# Patient Record
Sex: MALE | Race: WHITE | NOT HISPANIC OR LATINO | Employment: FULL TIME | URBAN - METROPOLITAN AREA
[De-identification: names, ages, dates, MRNs, and addresses within clinical notes are randomized per-mention and may not be internally consistent; named-entity substitution may affect disease eponyms.]

---

## 2021-01-15 ENCOUNTER — TELEPHONE (OUTPATIENT)
Dept: DERMATOLOGY | Facility: CLINIC | Age: 51
End: 2021-01-15

## 2021-01-15 NOTE — TELEPHONE ENCOUNTER
[facility] Call Center    Phone Message    May a detailed message be left on voicemail: yes     Reason for Call: Patient called to schedule a new derm visit for a cyst by his eye and is not scheduled within 28 days. Please advise. Thank you.    Action Taken: Message routed to:  Adult Clinics: Dermatology p [id number]    Travel Screening: Not Applicable

## 2021-01-15 NOTE — TELEPHONE ENCOUNTER
I spoke with Ryan and notified him that an in person appt is appropriate.  I scheduled him with Dr. Fong on 1/25/21.  Paris Terry RN

## 2021-02-01 ENCOUNTER — OFFICE VISIT (OUTPATIENT)
Dept: DERMATOLOGY | Facility: CLINIC | Age: 51
End: 2021-02-01
Payer: COMMERCIAL

## 2021-02-01 DIAGNOSIS — D48.5 NEOPLASM OF UNCERTAIN BEHAVIOR OF SKIN: ICD-10-CM

## 2021-02-01 DIAGNOSIS — L71.9 ROSACEA: Primary | ICD-10-CM

## 2021-02-01 DIAGNOSIS — L91.8 SKIN TAG: ICD-10-CM

## 2021-02-01 PROCEDURE — 99202 OFFICE O/P NEW SF 15 MIN: CPT | Mod: 25 | Performed by: DERMATOLOGY

## 2021-02-01 PROCEDURE — 11102 TANGNTL BX SKIN SINGLE LES: CPT | Performed by: DERMATOLOGY

## 2021-02-01 PROCEDURE — 88305 TISSUE EXAM BY PATHOLOGIST: CPT | Performed by: PATHOLOGY

## 2021-02-01 PROCEDURE — 11103 TANGNTL BX SKIN EA SEP/ADDL: CPT | Performed by: DERMATOLOGY

## 2021-02-01 ASSESSMENT — PAIN SCALES - GENERAL: PAINLEVEL: NO PAIN (0)

## 2021-02-01 NOTE — NURSING NOTE
The following medication was given:     MEDICATION:  Lidocaine with epinephrine 1% 1:[id number]  ROUTE: SQ  SITE: see procedure note  DOSE: 3cc  LOT #: -[id number]  : Niki  EXPIRATION DATE: 6/2021  NDC#: 4925-7155-79   Was there drug waste? 0cc  Multi-dose vial: Yes    Nadine Donis LPN  February 1, 2021

## 2021-02-01 NOTE — LETTER
2/1/2021         RE: Ryan Briseno  [address]  [place] [address]        Dear Colleague,    Thank you for referring your patient, Ryan Briseno, to the [facility]. Please see a copy of my visit note below.    [facility] Dermatology Note  Encounter Date: Feb 1, 2021  Office Visit     Dermatology Problem List:  1. NUB - right medial lower eyelid, right upper lip  -bx 2/1/21  2. Rosacea, papular pustular type  ____________________________________________    Assessment & Plan:  # Neoplasm of uncertain behavior on the right medial lower eyelid. The differential diagnosis includes BCC vs IDN vs cyst   - See procedure note     # Neoplasm of uncertain behavior on the right upper lip. The differential diagnosis includes BCC vs IDN vs cyst   - See procedure note     # Skin tag   - Patient interested in removal of skin tag on left lower eyelid. Patient will follow up for removal.    # Rosacea.   - Patient declines treatment at this time.      Procedures Performed:   -Shave biopsy x2: Location(s) right medial lower eyelid, right upper lip.  After discussion of benefits and risks including but not limited to bleeding/bruising, pain/swelling, infection, scar, incomplete removal, nerve damage/numbness, recurrence, and non-diagnostic biopsy, written consent, verbal consent and photographs were obtained. Time-out was performed. The area was cleaned with isopropyl alcohol. 0.5mL of 1% lidocaine with epinephrine was injected to obtain adequate anesthesia of each lesion. Shave biopsy was performed. Hemostasis was achieved with aluminium chloride. Vaseline and a sterile dressing were applied. The patient tolerated the procedure and no complications were noted. The patient was provided with verbal and written post care instructions.       Follow-up: pending path results    Staff and Scribe:     Scribe Disclosure:   IJuaquin, am serving as a scribe to document  "services personally performed by this physician, Dr. Yordy Fong, based on data collection and the provider's statements to me.     Provider Disclosure:   The documentation recorded by the scribe accurately reflects the services I personally performed and the decisions made by me.  I personally performed the procedures today.    Yordy Fong DO    Department of Dermatology  [facility]: Phone: [phone], Fax:[phone]  [facility]: Phone: [phone], Fax: [phone]    ____________________________________________    CC: Derm Problem (Ryan is here today for two concerns, right medial canthus. Showed up a year ago, reports bleeding, keeps growing. And left upper eyelid has been there for about 2 years, causing his eye to twitch. )    HPI:  Mr. Ryan Briseno is a(n) 50 year old male who presents today as a new patient for a spot check x 2.    Self referred to derm. He does not have a history of skin cancer. He reports a family history of melanoma, in his father.    Today, he reports concerns about an area on the right medial canthus he would like examined. This showed up a year ago, and he has noticed the area bleeding. This has continued to grow. This spot does not hurt and is soft. This spot has not secreted anything and did not pop like a pimple. He does note he has had more acne in the past 2-3 years. He works as an .     He has another area on the left upper eyelid that's been present for 2 years and \"causes his eye to twitch\". He notes this spot will get bumped occasionally, causing it to become \"big, red and swollen\".    He has another area on his upper lip that will bleed if he bumps it while shaving. He originally believed this was a pimple. He has another spot that's been present since his teen years, that he notes used to secrete a smelly white " liquid.    Patient is otherwise feeling well, without additional concerns.    Labs:  NA    Physical Exam:  Vitals: There were no vitals taken for this visit.  SKIN: Focused examination of forehead, temples, lateral cheeks, ears, eyelids, neck and back, was performed.  - Right upper lip: pearly papule  - Right medial lower eyelid: pearly papule  - Left Posterior Shoulder: There is a raised dome shaped nodule with a central punctum  - There are erythematous papules and scattered telangectasias on the bilateral cheeks and nose.   - There is(are) skin colored pedunculated papules on the left upper eyelid.   - No other lesions of concern on areas examined.       Medications:  No current outpatient medications on file.     No current facility-administered medications for this visit.       Past Medical History:   There is no problem list on file for this patient.    No past medical history on file.        Again, thank you for allowing me to participate in the care of your patient.        Sincerely,        Yordy Fong MD

## 2021-02-01 NOTE — PROGRESS NOTES
[facility] Dermatology Note  Encounter Date: Feb 1, 2021  Office Visit     Dermatology Problem List:  1. NUB - right medial lower eyelid, right upper lip  -bx 2/1/21  2. Rosacea, papular pustular type  ____________________________________________    Assessment & Plan:  # Neoplasm of uncertain behavior on the right medial lower eyelid. The differential diagnosis includes BCC vs IDN vs cyst   - See procedure note     # Neoplasm of uncertain behavior on the right upper lip. The differential diagnosis includes BCC vs IDN vs cyst   - See procedure note     # Skin tag   - Patient interested in removal of skin tag on left lower eyelid. Patient will follow up for removal.    # Rosacea.   - Patient declines treatment at this time.      Procedures Performed:   -Shave biopsy x2: Location(s) right medial lower eyelid, right upper lip.  After discussion of benefits and risks including but not limited to bleeding/bruising, pain/swelling, infection, scar, incomplete removal, nerve damage/numbness, recurrence, and non-diagnostic biopsy, written consent, verbal consent and photographs were obtained. Time-out was performed. The area was cleaned with isopropyl alcohol. 0.5mL of 1% lidocaine with epinephrine was injected to obtain adequate anesthesia of each lesion. Shave biopsy was performed. Hemostasis was achieved with aluminium chloride. Vaseline and a sterile dressing were applied. The patient tolerated the procedure and no complications were noted. The patient was provided with verbal and written post care instructions.       Follow-up: pending path results    Staff and Scribe:     Scribe Disclosure:   I, Juaquin Espinal, am serving as a scribe to document services personally performed by this physician, Dr. Yordy Fong, based on data collection and the provider's statements to me.     Provider Disclosure:   The documentation recorded by the scribe accurately reflects the services I personally performed and  "the decisions made by me.  I personally performed the procedures today.    Yordy Fong DO    Department of Dermatology  [facility]: Phone: [phone], Fax:[phone]  [facility]: Phone: [phone], Fax: [phone]    ____________________________________________    CC: Derm Problem (Ryan is here today for two concerns, right medial canthus. Showed up a year ago, reports bleeding, keeps growing. And left upper eyelid has been there for about 2 years, causing his eye to twitch. )    HPI:  Mr. Ryan Briseno is a(n) 50 year old male who presents today as a new patient for a spot check x 2.    Self referred to derm. He does not have a history of skin cancer. He reports a family history of melanoma, in his father.    Today, he reports concerns about an area on the right medial canthus he would like examined. This showed up a year ago, and he has noticed the area bleeding. This has continued to grow. This spot does not hurt and is soft. This spot has not secreted anything and did not pop like a pimple. He does note he has had more acne in the past 2-3 years. He works as an .     He has another area on the left upper eyelid that's been present for 2 years and \"causes his eye to twitch\". He notes this spot will get bumped occasionally, causing it to become \"big, red and swollen\".    He has another area on his upper lip that will bleed if he bumps it while shaving. He originally believed this was a pimple. He has another spot that's been present since his teen years, that he notes used to secrete a smelly white liquid.    Patient is otherwise feeling well, without additional concerns.    Labs:  NA    Physical Exam:  Vitals: There were no vitals taken for this visit.  SKIN: Focused examination of forehead, temples, lateral cheeks, ears, eyelids, neck and back, was performed.  - " Right upper lip: pearly papule  - Right medial lower eyelid: pearly papule  - Left Posterior Shoulder: There is a raised dome shaped nodule with a central punctum  - There are erythematous papules and scattered telangectasias on the bilateral cheeks and nose.   - There is(are) skin colored pedunculated papules on the left upper eyelid.   - No other lesions of concern on areas examined.       Medications:  No current outpatient medications on file.     No current facility-administered medications for this visit.       Past Medical History:   There is no problem list on file for this patient.    No past medical history on file.

## 2021-02-01 NOTE — NURSING NOTE
Ryan Briseno's goals for this visit include:   Chief Complaint   Patient presents with     Derm Problem     Ryan is here today for two concerns, right medial canthus. Showed up a year ago, reports bleeding, keeps growing. And left upper eyelid has been there for about 2 years, causing his eye to twitch.        He requests these members of his care team be copied on today's visit information:     PCP: No Ref-Primary, Physician    Referring Provider:  No referring provider defined for this encounter.    There were no vitals taken for this visit.    Do you need any medication refills at today's visit? No    Misa Resendiz LPN

## 2021-02-01 NOTE — PATIENT INSTRUCTIONS

## 2021-02-03 LAB — COPATH REPORT: NORMAL

## 2021-02-08 ENCOUNTER — TELEPHONE (OUTPATIENT)
Dept: DERMATOLOGY | Facility: CLINIC | Age: 51
End: 2021-02-08

## 2021-02-08 DIAGNOSIS — Z79.2 PROPHYLACTIC ANTIBIOTIC: Primary | ICD-10-CM

## 2021-02-08 RX ORDER — CLINDAMYCIN HCL 300 MG
CAPSULE ORAL
Qty: 2 CAPSULE | Refills: 0 | Status: SHIPPED | OUTPATIENT
Start: 2021-02-08 | End: 2022-02-14

## 2021-02-08 NOTE — TELEPHONE ENCOUNTER
[facility] [place] Pre-Surgery Screening Note     Pre-screening Information:  Hx of Skin Cancer: No  Hx of Mohs Surgery: No  Transplant: No  Immunocompromised: No  Current Anticoagulant(s): None  Bleeding Disorder(s): No  Stent: No  Pacemaker: No  Defibrillator: No  Brain/Nerve Stimulator: No  Endocarditis/Rheumatic Fever Hx: No  Vascular graft: No  Prophylactic Antibiotic Needed: Yes  Prophylactic Antibotic: Clindamycin  Prophylactic Antibiotic Indication: Other (Comments)  Congenital heart defect: No  Prosthetic Heart Valve: No  Lesion on Leg/Groin: No  Prosthetic Joint : No  Diabetic: No  HIV/AIDS: No  Hepatitis: No  Pregnant: No  Prior Problem with Local Anesthesia: No  Patient wears CPAP mask: Nose  Current Tobacco Use: Yes (3/4 ppd. Pt counseled that smoking impairs wound healing.)  Current Alcohol Use: No  Extended Care Facility: No  Occupation:    needed?: Yes  Do you have mobility issues?: No  Do you use any assistive devices/DME?: No  Do you have any issues with lying for long periods of time?: No      Medications/Allergies  Medications and allergies review with patient: Yes     No current outpatient medications on file.     Allergies   Allergen Reactions     Penicillins Hives       Pertinent Labs:  Last Creatine:   Creatinine   Date Value Ref Range Status   02/26/2008 1.21 0.80 - 1.50 mg/dL Final     Last INR: No results found for: INR    Other Questions:    Reminded patient to take any ordered prophylactic antibiotics 1 hour prior to appointment: Yes    If on a prescribed anticoagulant, advised patient to continue or check with prescribing provider: n/a    If on an OTC anticoagulant/supplement, advised patient to hold these medications 10-14 days prior to surgery and resume 48 hrs after surgery: n/a     Please be aware that this can be an all day procedure. Please bring your daily medications and food/cash. Encourage patient to eat prior to  procedure(s). After care instructions were reviewed with patient.  Patient notified to have a .    Paris Terry RN

## 2021-02-08 NOTE — TELEPHONE ENCOUNTER
Results reviewed with Alyse.  He verbalized understanding and agreed with the plan.  He is going to check his schedule and call us back tomorrow to schedule on a Thursday (Feb 18th or March 4th).  Mohs previsit complete.  KATELYNN Green, Yordy LARIOS MD sent to Niall David MD    Cc: P [facility] Dermatology Adult [place]             Dr. David - -[facility]     Staff- Please call the patient with pathology results.     Both biopsies showed BCC. My treatment recommendation is Mohs.     We talked some about Mohs at his biopsy appointment. Ok to schedule Mohs unless he would prefer a virtual consult.   We could do both sites on a Thursday when Dr. David is here (he will not be here 2/25).     Thank you.    Associated Results    Dermatological path order and indications  Order: [id number]  Status:  Final result   Visible to patient:  No (inaccessible in MyChart) Dx:  Neoplasm of uncertain behavior of skin  Component 7d ago   Copath Report Patient Name: ALYSE HEARN   MR#: [id number]   Specimen #: [id number]   Collected: 2/1/2021   Received: 2/2/2021   Reported: 2/3/2021 23:02   Ordering Phy(s): YORDY OLIVEROS     For improved result formatting, select 'View Enhanced Report Format' under    Linked Documents section.     SPECIMEN(S):   A: Skin, right upper lip, shave   B: Skin, right medial lower eyelid, shave     FINAL DIAGNOSIS:   A. Skin, right upper lip, shave:   - Basal cell carcinoma, nodular type, extending to the lateral and deep   margins - (see description)     B. Skin, right medial lower eyelid, shave:   - Basal cell carcinoma, nodular type, extending to the lateral and deep   margins -

## 2021-02-11 ENCOUNTER — TELEPHONE (OUTPATIENT)
Dept: DERMATOLOGY | Facility: CLINIC | Age: 51
End: 2021-02-11

## 2021-02-11 NOTE — TELEPHONE ENCOUNTER
[facility] Call Center    Phone Message    May a detailed message be left on voicemail: yes     Reason for Call: Other: pt returning call, please call her back     Action Taken: Message routed to:  Adult Clinics: Dermatology p [id number]    Travel Screening: Not Applicable

## 2021-03-16 ENCOUNTER — TELEPHONE (OUTPATIENT)
Dept: DERMATOLOGY | Facility: CLINIC | Age: 51
End: 2021-03-16

## 2021-03-16 NOTE — TELEPHONE ENCOUNTER
Nurse spoke to patient who stated that he wants to reschedule his MOHS procedures but would like to get a skin tag removed on his left lower eyelid. Nurse informed patient of risks of delaying the skin cancer procedures, pt confirmed understanding verbally but stressed to nurse that he needs to delay the procedures due to the cost. Patient asked when he could be scheduled for his skin tag removal. Nurse will route to Dr. Fong to advise.     Misa Resendiz LPN

## 2021-03-17 NOTE — TELEPHONE ENCOUNTER
Nurse spoke to patient and confirmed information. No further questions or concerns.    Misa Resendiz LPN

## 2021-03-17 NOTE — TELEPHONE ENCOUNTER
Nurse spoke to Dr. Fong who ok'd patient to keep his appointment for skin tag removal on his left lower eyelid on 3/24/21 at 8:30am. Will plan to discuss patient wanting to delay his MOHS procedures at that appointment date and time.     Nurse attempted to call patient, no answer. LVM on patient identified voicemail informing patient of information above. Call back number provided if patient has any questions or concerns.    Misa Resendiz LPN

## 2021-03-24 ENCOUNTER — OFFICE VISIT (OUTPATIENT)
Dept: DERMATOLOGY | Facility: CLINIC | Age: 51
End: 2021-03-24
Payer: COMMERCIAL

## 2021-03-24 DIAGNOSIS — C44.01 BASAL CELL CARCINOMA OF SKIN OF RIGHT UPPER LIP: ICD-10-CM

## 2021-03-24 DIAGNOSIS — C44.1122 BASAL CELL CARCINOMA (BCC) OF RIGHT LOWER EYELID: ICD-10-CM

## 2021-03-24 DIAGNOSIS — D48.5 NEOPLASM OF UNCERTAIN BEHAVIOR OF SKIN: Primary | ICD-10-CM

## 2021-03-24 DIAGNOSIS — L91.8 INFLAMED ACROCHORDON: ICD-10-CM

## 2021-03-24 PROCEDURE — 11200 RMVL SKIN TAGS UP TO&INC 15: CPT | Performed by: DERMATOLOGY

## 2021-03-24 PROCEDURE — 99213 OFFICE O/P EST LOW 20 MIN: CPT | Mod: 25 | Performed by: DERMATOLOGY

## 2021-03-24 ASSESSMENT — PAIN SCALES - GENERAL: PAINLEVEL: NO PAIN (0)

## 2021-03-24 NOTE — NURSING NOTE
"Ryan Briseno's goals for this visit include:   Chief Complaint   Patient presents with     Skin Tags     left upper eyelid - patient states \"makes my eyelid twitch\"       He requests these members of his care team be copied on today's visit information:     PCP: No Ref-Primary, Physician    Referring Provider:  No referring provider defined for this encounter.    There were no vitals taken for this visit.    Do you need any medication refills at today's visit? No    Nadine Donis LPN      "

## 2021-03-24 NOTE — LETTER
3/24/2021         RE: Ryan Briseno  [address]  [facility] [address]        Dear Colleague,    Thank you for referring your patient, Ryan Briseno, to the [facility]. Please see a copy of my visit note below.    Left upper eyelid cryo skin tag         [facility] Dermatology Note  Encounter Date: Mar 24, 2021  Office Visit     Dermatology Problem List:  1. BCC, right upper lip. Schedule Mohs      BCC, right medial lower eyelid. Schedule Mohs.   2. Acrochordon left upper eyelid   LN2 today  3. Rosacea, papular pustular type. Declines treatment.     ____________________________________________    Assessment & Plan:     # BCC x2.  Right upper lip and the right medial lower eyelid.  Discuss risks of postponing surgery.  Patient has dental procedures currently scheduled and would like to complete them before having Mohs.  Additionally his out-of-pocket expenses are likely to be high.   Schedule Mohs surgery when able.     # Inflamed acrochordon, left upper eyelid  Benign nature reviewed.   Cryotherapy procedure note: After verbal consent and discussion of risks and benefits including but no limited to dyspigmentation/scar, blister, and pain, 1 inflamed acrochordon was treated with 1-2mm freeze border for 2 cycles with liquid nitrogen. Post cryotherapy instructions were provided.     # Neoplasm of undetermined behavior, right infraorbital cheek.   Suspicious for 3rd BCC vs granulomatous rosacea papule.   If persistent at the time of his Mohs, will plan for frozen biopsy.      Follow-up: schedule Mohs     Staff:   I personally performed the procedures today.    Yordy Fong DO    Department of Dermatology  [facility]: Phone: [phone], Fax:[phone]  [facility]: Phone: [phone], Fax:  "[phone]      ____________________________________________    CC: Skin Tags (left upper eyelid - patient states \"makes my eyelid twitch\")    HPI:  Mr. Ryan Briseno is a(n) 50 year old male who presents today as a return patient for and inflamed acrochordon of the left upper eyelid. It is starting to feel heavy. He has noticed more frequent eyelid twitching. Intermittently it will get painful.     He's also noticed another pink papule below his right eye. It is similar feeling to the BCC's previously biopsied.     Patient is otherwise feeling well, without additional skin concerns.     Labs Reviewed:  N/A    Physical Exam:  Vitals: There were no vitals taken for this visit.  SKIN: Focused examination of face was performed.  - skin colored pedunculated soft papule, 3mm on left upper eyelid.   - Pink pearly papule on right infraorbital cheek, ~7mm  - No other lesions of concern on areas examined.     Medications:  Current Outpatient Medications   Medication     clindamycin (CLEOCIN) 300 MG capsule     No current facility-administered medications for this visit.             Again, thank you for allowing me to participate in the care of your patient.        Sincerely,        Yordy Fong MD    "

## 2021-03-24 NOTE — PATIENT INSTRUCTIONS
Cryotherapy    What is it?    Use of a very cold liquid, such as liquid nitrogen, to freeze and destroy abnormal skin cells that need to be removed    What should I expect?    Tenderness and redness    A small blister that might grow and fill with dark purple blood. There may be crusting.    More than one treatment may be needed if the lesions do not go away.    How do I care for the treated area?    Gently wash the area with your hands when bathing.    Use a thin layer of Vaseline to help with healing. You may use a Band-Aid.     The area should heal within 7-10 days and may leave behind a pink or lighter color.     Do not use an antibiotic or Neosporin ointment.     You may take acetaminophen (Tylenol) for pain.     Call your Doctor if you have:    Severe pain    Signs of infection (warmth, redness, cloudy yellow drainage, and or a bad smell)    Questions or concerns    Who should I call with questions?       [facility]: [phone]       [facility]: [phone]       For urgent needs outside of business hours call the [facility] at [phone]        and ask for the dermatology resident on call

## 2021-03-24 NOTE — PROGRESS NOTES
"Left upper eyelid cryo skin tag         [facility] Dermatology Note  Encounter Date: Mar 24, 2021  Office Visit     Dermatology Problem List:  1. BCC, right upper lip. Schedule Mohs      BCC, right medial lower eyelid. Schedule Mohs.   2. Acrochordon left upper eyelid   LN2 today  3. Rosacea, papular pustular type. Declines treatment.     ____________________________________________    Assessment & Plan:     # BCC x2.  Right upper lip and the right medial lower eyelid.  Discuss risks of postponing surgery.  Patient has dental procedures currently scheduled and would like to complete them before having Mohs.  Additionally his out-of-pocket expenses are likely to be high.   Schedule Mohs surgery when able.     # Inflamed acrochordon, left upper eyelid  Benign nature reviewed.   Cryotherapy procedure note: After verbal consent and discussion of risks and benefits including but no limited to dyspigmentation/scar, blister, and pain, 1 inflamed acrochordon was treated with 1-2mm freeze border for 2 cycles with liquid nitrogen. Post cryotherapy instructions were provided.     # Neoplasm of undetermined behavior, right infraorbital cheek.   Suspicious for 3rd BCC vs granulomatous rosacea papule.   If persistent at the time of his Mohs, will plan for frozen biopsy.      Follow-up: schedule Mohs     Staff:   I personally performed the procedures today.    Yordy Fong DO    Department of Dermatology  [facility]: Phone: [phone], Fax:[phone]  [facility]: Phone: [phone], Fax: [phone]      ____________________________________________    CC: Skin Tags (left upper eyelid - patient states \"makes my eyelid twitch\")    HPI:  Mr. Ryan Briseno is a(n) 50 year old male who presents today as a return patient for and inflamed acrochordon of the left upper eyelid. It is " starting to feel heavy. He has noticed more frequent eyelid twitching. Intermittently it will get painful.     He's also noticed another pink papule below his right eye. It is similar feeling to the BCC's previously biopsied.     Patient is otherwise feeling well, without additional skin concerns.     Labs Reviewed:  N/A    Physical Exam:  Vitals: There were no vitals taken for this visit.  SKIN: Focused examination of face was performed.  - skin colored pedunculated soft papule, 3mm on left upper eyelid.   - Pink pearly papule on right infraorbital cheek, ~7mm  - No other lesions of concern on areas examined.     Medications:  Current Outpatient Medications   Medication     clindamycin (CLEOCIN) 300 MG capsule     No current facility-administered medications for this visit.

## 2021-05-24 ENCOUNTER — TELEPHONE (OUTPATIENT)
Dept: DERMATOLOGY | Facility: CLINIC | Age: 51
End: 2021-05-24
Payer: COMMERCIAL

## 2021-05-24 NOTE — TELEPHONE ENCOUNTER
I spoke with Ryan to follow up on if he would like to schedule Mohs surgery for BCC.  Due to financial circumstances and starting a new job he would like a call back in ~2 months to schedule (BCC x2.  Right upper lip and the right medial lower eyelid).    We agreed that the clinic would call him in 2 months to re-visit.    Paris Terry RN

## 2021-07-26 NOTE — TELEPHONE ENCOUNTER
I spoke with Ryan and tentatively scheduled him for 9/8/21.  He would like a phone call 1 week prior to the appointment to confirm that it will still work for him as they are moving and he needs to figure out how to get his children to school.     I notified him that we would reach out the week prior to confirm.    Paris Terry RN

## 2021-09-01 NOTE — TELEPHONE ENCOUNTER
Spoke with patient and states 9/8/21 will not work for him. He is still in the process of moving, unable to schedule another appointment while on the phone. Would like a call next week to get him scheduled.          Kia Child on 9/1/2021 at 4:23 PM

## 2021-09-07 NOTE — TELEPHONE ENCOUNTER
Attempted to call patient, no answer. LVM for patient to give us a call back, number was provided.     Misa Resendiz LPN

## 2021-10-25 NOTE — TELEPHONE ENCOUNTER
Left message to call back clinic regarding scheduling MOHS.   Kathy Bailey, [facility] on 10/25/2021 at 1:15 PM

## 2021-12-09 ENCOUNTER — TELEPHONE (OUTPATIENT)
Dept: DERMATOLOGY | Facility: CLINIC | Age: 51
End: 2021-12-09
Payer: COMMERCIAL

## 2021-12-09 NOTE — TELEPHONE ENCOUNTER
Letter written by Dr. Fong on 12/8/21 was mailed certified to patient re: untreated skin cancers.  Paris Terry RN

## 2021-12-22 NOTE — TELEPHONE ENCOUNTER
Certified letter returned as patient has new address.  Address updated in demographics and new certified letter mailed.  Paris Terry RN

## 2021-12-29 ENCOUNTER — TELEPHONE (OUTPATIENT)
Dept: DERMATOLOGY | Facility: CLINIC | Age: 51
End: 2021-12-29
Payer: COMMERCIAL

## 2021-12-29 NOTE — TELEPHONE ENCOUNTER
"Patient called back to schedule MOHS procedure (biopsied 2/1/21.) Patient was waiting until he paid off biopsies to schedule MOHS.  Patient stated he only wants to have BCC on R medial lower eyelid treated due to the recurrence of the lesion. He is not concerned about the R upper lip as he states it has not \"come back.\" I informed patient that both biopsies showed the BCC's extended to lateral and deep margins. He is needing a later morning appointment as he has to get his kids to school. He cannot come in before 9-930am. Informed him we typically do our MOHS cases between 730-830am. He declines scheduling anywhere but [facility] as well. He was informed that delaying treatment further of these skin cancers may result in larger procedures/ wounds/ scars. He will check with his wife to see if she can accommodate him coming in earlier for MOHS and see if they can financially afford to treat both skin cancers as they are also redoing their kitchen at the moment.     Kathy Bailey, CMA on 12/29/2021 at 10:08 AM    "

## 2021-12-31 NOTE — TELEPHONE ENCOUNTER
"Patient called back to schedule MOHS. Scheduled teleconsult week prior as this R medial lower eyelid has an untreated BCC from 2/1/2021. Patient was driving and unable to complete MOHS questionnaire. Will do at time of consult- noted on appt. He stated that he had felt like the clinic was \"over-the-top\" by sending him a ceritified letter re: him declining treating these but after speaking to his wife who is an inpatient RN he feels he needs to move forward. He cannot for financial reasons do both sites at once. He also needs to do it when he has childcare. Patient has concerns about being able to drive after procedure as he has kids who have doctor appointments he needs to be able to bring them to that day and the following. I informed patient that there is a chance his eye may be swollen and his ability to drive may be impaired. I also reminded patient of the risks associated with not treating his R upper lip skin cancer but he became a bit upset and I did not push this. After several discussions with patient within the last week or so I feel it important he have a consult with Dr. Fong to discuss the procedure, outcomes, risks associated with the procedure and risks associated with not treating his other skin cancer.     Dr. Fong, patient will send photo prior to consult via telephone. Is there anything else you would like us to discuss with patient prior to talking with him?    Kathy Bailey, [facility] on 12/31/2021 at 3:43 PM    "

## 2022-01-03 NOTE — TELEPHONE ENCOUNTER
I spoke with Ryan and notified him of Dr. Fong's reply.  He verbalized understanding.  Appts confirmed.  Yordy Fong MD [place], Kathy PANDYA CMA 1 hour ago (1:17 PM)     AM    I don't think we'll need Dr. Medina's help with this case. Thank you.           Paris Terry RN

## 2022-01-03 NOTE — TELEPHONE ENCOUNTER
"Attempted to call patient to inform him of message below from Dr. Fong. There was no answer. LVM on patient identified voicemail for patient to call back, number was provided.     \"Thank you for working with him on these spots. Getting good pictures of these sites for the consult will be important. If the skin cancer has grown, sometimes the oculoplastic surgeon needs to assist with the closure. If this is the case, we may need to coordinate scheduling surgery with Dr. Medina. The sooner we get the pictures the sooner I can make a decision.     Thank you. \"    We need photos from the patient as soon as possible. If patient calls back, please give him this information.    Misa Resendiz LPN    "

## 2022-01-13 ENCOUNTER — VIRTUAL VISIT (OUTPATIENT)
Dept: DERMATOLOGY | Facility: CLINIC | Age: 52
End: 2022-01-13
Payer: COMMERCIAL

## 2022-01-13 DIAGNOSIS — C44.1122 BASAL CELL CARCINOMA (BCC) OF RIGHT LOWER EYELID: Primary | ICD-10-CM

## 2022-01-13 PROCEDURE — 99213 OFFICE O/P EST LOW 20 MIN: CPT | Mod: TEL | Performed by: DERMATOLOGY

## 2022-01-13 ASSESSMENT — PAIN SCALES - GENERAL: PAINLEVEL: NO PAIN (0)

## 2022-01-13 NOTE — LETTER
1/13/2022         RE: Ryan Briseno  [address]  [facility] [address]        Dear Colleague,    Thank you for referring your patient, Ryan Briseno, to the [facility]. Please see a copy of my visit note below.    Mohs Micrographic Surgery Consult Note    Jan 13, 2022  Start time (if telephone encounter): 1:55 p.m.  End time (if telephone encounter): 2:05 p.m.    Dermatology Problem List:  1. History of BCCs  - BCC, right upper lip, pending MMS (deferred by patient as of 1/13/22 due to financial concerns)  - BCC, right medial lower eyelid, pending MMS  2. Acrochordon left upper eyelid              LN2 today  3. Rosacea, papular pustular type. Declines treatment.     Subjective: The patient is a 51 year old man who presents today for Mohs micrographic surgery consultation for a recent diagnosis of skin cancer.    Skin cancer(s): BCC  Location(s): right medial lower eyelid  Associated symptoms: pruritus, tenderness to touch  Onset: About 1 year or longer    No other associated symptoms, modifying factors, or prior treatments, except when noted above. The patient denies any constitutional symptoms, lymphadenopathy, unintentional weight loss or decreased appetite. No other skin concerns today.    Of note, the patient has another untreated skin cancer: BCC, right upper lip, pending MMS. Surgery has to this point been deferred by patient as of 1/13/22 due to financial concerns. He is aware of the risks of deferred treatment of this skin cancer.    Objective:   Skin: Focused examination of the face within the teledermatology photograph(s) on 1/3/2022 was performed.   - At the right medial lower eyelid, there is a pearly plaque corresponding to known skin cancer as above.    Assessment and Plan:     1. Plan for Mohs micrographic surgery for skin cancer(s) above:  - BCC, right medial lower eyelid, pending MMS  - We discussed the nature of the diagnosis/condition above. We discussed the  treatment options, including the risks benefits and expectations of these options. We recommend micrographic surgery as the most effective and most tissue sparing option for treatment, and the patient agrees to proceed with this.  The patient is aware of the risks, benefits and expectations of this procedure. The patient will be scheduled for this procedure, if not already done so.  - We anticipate the following closure type: Sliding or lifting flap   - For BCC, right upper lip, pending MMS, surgery was deferred by patient as of 1/13/22 due to financial concerns. He says he is aware that delayed treatment with surgery can lead to a larger skin cancer requiring larger surgery and scar. He says he understands the risks of deferred treatment of this skin cancer.    The patient was discussed with and evaluated by attending physician, Yordy Fong DO.    Justin Burrell MD  Micrographic Surgery and Dermatologic Oncology (MSDO) Fellow    Staff Physician Comments:   I saw the photos and evaluated the patient with the fellow (Dr. Cleve Burrell) and I agree with the assessment and plan. I was present for the key portions of the telephone call.    Yordy Fong DO    Department of Dermatology  [facility]: Phone: [phone], Fax:[phone]  [facility]: Phone: [phone], Fax: [phone]        Again, thank you for allowing me to participate in the care of your patient.        Sincerely,        Yordy Fong MD

## 2022-01-13 NOTE — NURSING NOTE
[facility] Dermatologic Surgery Clinic [place] Pre-Surgery Screening Note     Pre-screening Information:  Hx of Skin Cancer: No  Hx of Mohs Surgery: No  Transplant: No  Immunocompromised: No  Current Anticoagulant(s): None  Bleeding Disorder(s): No  Stent: No  Pacemaker: No  Defibrillator: No  Brain/Nerve Stimulator: No  Endocarditis/Rheumatic Fever Hx: No  Vascular graft: No  Congenital heart defect: No  Prosthetic Heart Valve: No  Lesion on Leg/Groin: No  Prosthetic Joint : No  Diabetic: No  HIV/AIDS: No  Hepatitis: No  Pregnant: No  Prior Problem with Local Anesthesia: No  Patient wears CPAP mask: Nose  Current Tobacco Use: Yes  Current Alcohol Use: No  Extended Care Facility: No      Medications/Allergies  Medications and allergies review with patient: Yes     Current Outpatient Medications   Medication Sig Dispense Refill     clindamycin (CLEOCIN) 300 MG capsule Take 2 capsules (600 mg) 1 hour prior to surgery. (Patient not taking: Reported on 3/24/2021) 2 capsule 0     Allergies   Allergen Reactions     Penicillins Hives       Pertinent Labs:  Last INR: No results found for: INR    Other Reminders:    Reminded patient to take any order prophylactic antibiotics 1 hour prior to appointment:      Please be aware that this can be an all day procedure. Please bring your daily medications and food/cash. Encourage patient to eat prior to procedure(s). After care instructions were reviewed with patient.    If any positives, send to RN to initiate antibiotic prophylaxis protocol and/or anticoagulation management protocol      Nadine Donis LPN

## 2022-01-13 NOTE — PROGRESS NOTES
Mohs Micrographic Surgery Consult Note    Jan 13, 2022  Start time (if telephone encounter): 1:55 p.m.  End time (if telephone encounter): 2:05 p.m.    Dermatology Problem List:  1. History of BCCs  - BCC, right upper lip, pending MMS (deferred by patient as of 1/13/22 due to financial concerns)  - BCC, right medial lower eyelid, pending MMS  2. Acrochordon left upper eyelid              LN2 today  3. Rosacea, papular pustular type. Declines treatment.     Subjective: The patient is a 51 year old man who presents today for Mohs micrographic surgery consultation for a recent diagnosis of skin cancer.    Skin cancer(s): BCC  Location(s): right medial lower eyelid  Associated symptoms: pruritus, tenderness to touch  Onset: About 1 year or longer    No other associated symptoms, modifying factors, or prior treatments, except when noted above. The patient denies any constitutional symptoms, lymphadenopathy, unintentional weight loss or decreased appetite. No other skin concerns today.    Of note, the patient has another untreated skin cancer: BCC, right upper lip, pending MMS. Surgery has to this point been deferred by patient as of 1/13/22 due to financial concerns. He is aware of the risks of deferred treatment of this skin cancer.    Objective:   Skin: Focused examination of the face within the teledermatology photograph(s) on 1/3/2022 was performed.   - At the right medial lower eyelid, there is a pearly plaque corresponding to known skin cancer as above.    Assessment and Plan:     1. Plan for Mohs micrographic surgery for skin cancer(s) above:  - BCC, right medial lower eyelid, pending MMS  - We discussed the nature of the diagnosis/condition above. We discussed the treatment options, including the risks benefits and expectations of these options. We recommend micrographic surgery as the most effective and most tissue sparing option for treatment, and the patient agrees to proceed with this.  The patient is aware  of the risks, benefits and expectations of this procedure. The patient will be scheduled for this procedure, if not already done so.  - We anticipate the following closure type: Sliding or lifting flap   - For BCC, right upper lip, pending [facility], surgery was deferred by patient as of 1/13/22 due to financial concerns. He says he is aware that delayed treatment with surgery can lead to a larger skin cancer requiring larger surgery and scar. He says he understands the risks of deferred treatment of this skin cancer.    The patient was discussed with and evaluated by attending physician, Yordy Fong DO.    Justin Burrell MD  Micrographic Surgery and Dermatologic Oncology (MSDO) Fellow    Staff Physician Comments:   I saw the photos and evaluated the patient with the fellow (Dr. Cleve Burrell) and I agree with the assessment and plan. I was present for the key portions of the telephone call.    Yordy Fong DO    Department of Dermatology  [facility]: Phone: [phone], Fax:[phone]  [facility]: Phone: [phone], Fax: [phone]

## 2022-01-15 ENCOUNTER — HEALTH MAINTENANCE LETTER (OUTPATIENT)
Age: 52
End: 2022-01-15

## 2022-01-31 ENCOUNTER — OFFICE VISIT (OUTPATIENT)
Dept: DERMATOLOGY | Facility: CLINIC | Age: 52
End: 2022-01-31
Payer: COMMERCIAL

## 2022-01-31 VITALS — DIASTOLIC BLOOD PRESSURE: 106 MMHG | SYSTOLIC BLOOD PRESSURE: 166 MMHG | HEART RATE: 52 BPM

## 2022-01-31 DIAGNOSIS — C44.1122 BASAL CELL CARCINOMA (BCC) OF RIGHT LOWER EYELID: Primary | ICD-10-CM

## 2022-01-31 PROCEDURE — 17311 MOHS 1 STAGE H/N/HF/G: CPT | Mod: 51 | Performed by: DERMATOLOGY

## 2022-01-31 PROCEDURE — 14060 TIS TRNFR E/N/E/L 10 SQ CM/<: CPT | Performed by: DERMATOLOGY

## 2022-01-31 ASSESSMENT — PAIN SCALES - GENERAL: PAINLEVEL: NO PAIN (0)

## 2022-01-31 NOTE — PROGRESS NOTES
[facility] Dermatologic Surgery Clinic [place] Procedure Note       Dermatology Problem List:  1. BCC, right upper lip. Schedule Mohs (patient has deferred in spite of written communication risk letter).       BCC, R medial lower eyelid, s/p Mohs and rhombic flap 1/31/22    2. Acrochordon left upper eyelid              LN2 today  3. Rosacea, papular pustular type.     ____________________________________________       Date of Service:  Jan 31, 2022  Surgery: Mohs micrographic surgery    Case 1  Repair Type: local flap (transposition)  Repair Size: 2.6x2.1 cm  Suture Material: Fast Absorbing Gut 5-0  Tumor Type: BCC - Basal cell carcinoma  Location: Right medial lower eyelid  Derm-Path Accession #: [id number]  PreOp Size: 1.3x0.9 cm  PostOp Size: 1.9x1.0 cm  Mohs Accession #: [id number]  Level of Defect: muscle      Procedure:  We discussed the principles of treatment and most likely complications including scarring, bleeding, infection, swelling, pain, crusting, nerve damage, large wound,  incomplete excision, wound dehiscence,  nerve damage, recurrence, and a second procedure may be recommended to obtain the best cosmetic or functional result.    Informed consent was obtained and the patient underwent the procedure as follows:  The patient was placed supine on the operating table.  The cancer was identified, outlined with a marker, and verified by the patient.  The entire surgical field was prepped with Iodine.  The surgical site was anesthetized using Lidocaine 1% with epi 1:100,000.      The area of clinically apparent tumor was debulked. The layer of tissue was then surgically excised using a #15 blade and was then transferred onto a specimen sheet maintaining the orientation of the specimen. Hemostasis was obtained using bipolar electrocoagulation. The wound site was then covered with a dressing while the tissue samples were processed for examination.    The excised tissue was transported to the Mohs  histology laboratory maintaining the tissue orientation.  The tissue specimen was relaxed so that the entire surgical margin was in a a single horizontal plane for sectioning and inked for precise mapping.  A precise reference map was drawn to reflect the sectioning of the specimen, colored inking of the margins, and orientation on the patient. The tissue was processed using horizontal sectioning of the base and continuous peripheral margins.  The histopathologic sections were reviewed in conjunction with the reference map.    Total blocks: 1    Total slides:  2    There were no cancer cells visualized on examination, therefore Mohs surgery was complete.    PROCEDURE: Rhombic Transposition Flap  The patient was taken to the operative suite and placed on the operating room table.  The wound was identified and infiltrated with Lidocaine 1% with epi 1:100,000.  The defect was then cleansed and prepped with Iodine and draped with sterile drapes.  Using a marker, a rhomboid transposition flap repair was planned.  The wound edges were then debeveled and the wound was undermined bluntly in all directions.  The transposition flap was incised sharply to the level of fat.  The flap was undermined from all surrounding tissue. Hemostasis was obtained with electrocautery.  The flap was transposed into the primary defect.  The secondary defect and flap closed with deep dermal 5-0 monocryl sutures Epidermal tissue was carefully approximated using simple running 5-0 fast absorbing gut epidermal sutures throughout the length of the flap.  Redundant skin was excised by the triangulation technique, and closed in similar fashion.  The wound was cleansed with sterile saline and polysporin was applied. A sterile non-adherent pressure dressing was placed.  The patient left the operating suite in stable condition.  The patient will return for wound check in 2 weeks. Wound care was reviewed verbally and in writing. Anticipate Dermabrasion  to be used as a second stage of this reconstruction.     Follow up in 2 weeks via virtual visit.    Scribe Disclosure:   I, Juaquin Espinal, am serving as a scribe to document services personally performed by this physician, Dr. Yordy Fong, based on data collection and the provider's statements to me.     Provider Disclosure:   The documentation recorded by the scribe accurately reflects the services I personally performed and the decisions made by me.  I personally performed the procedures today.    Yordy Fong DO    Department of Dermatology  [facility]: Phone: [phone], Fax:[phone]  [facility]: Phone: [phone], Fax: [phone]    Care and Laboratory Testing Performed at:  [facility]   Dermatology Clinic  [address] [place] [address]

## 2022-01-31 NOTE — NURSING NOTE
The following medication was given:     MEDICATION:  Lidocaine with epinephrine 1% 1:[id number]  ROUTE: SQ  SITE: see procedure note  DOSE: 7cc  LOT #: -[id number]  : Hospira  EXPIRATION DATE: 06/22  NDC#: 5146-4211-28  Was there drug waste? 2cc  Multi-dose vial: Yes    Misa Resendiz LPN  January 31, 2022    Vaseline and pressure dressing applied to Mohs site on right medial lower eyelid.  Wound care instructions reviewed with patient and AVS provided.  Patient verbalized understanding. No further questions or concerns at this time.

## 2022-01-31 NOTE — NURSING NOTE
Ryan Briseno's goals for this visit include:   Chief Complaint   Patient presents with     Derm Problem     Ryan is here today for mohs on right medial lower eyelid due to BCC       He requests these members of his care team be copied on today's visit information:     PCP: No Ref-Primary, Physician    Referring Provider:  No referring provider defined for this encounter.    BP (!) 166/106   Pulse 52     Do you need any medication refills at today's visit? No    Misa Resendiz LPN

## 2022-01-31 NOTE — LETTER
1/31/2022         RE: Ryan Briseno  [address]  [place] [address]        Dear Colleague,    Thank you for referring your patient, Ryan Briseno, to the [facility]. Please see a copy of my visit note below.    [facility] [place] Procedure Note       Dermatology Problem List:  1. BCC, right upper lip. Schedule Mohs (patient has deferred in spite of written communication risk letter).       BCC, R medial lower eyelid, s/p Mohs and rhombic flap 1/31/22    2. Acrochordon left upper eyelid              LN2 today  3. Rosacea, papular pustular type.     ____________________________________________       Date of Service:  Jan 31, 2022  Surgery: Mohs micrographic surgery    Case 1  Repair Type: local flap (transposition)  Repair Size: 2.6x2.1 cm  Suture Material: Fast Absorbing Gut 5-0  Tumor Type: BCC - Basal cell carcinoma  Location: Right medial lower eyelid  Derm-Path Accession #: [id number]  PreOp Size: 1.3x0.9 cm  PostOp Size: 1.9x1.0 cm  Mohs Accession #: [id number]  Level of Defect: muscle      Procedure:  We discussed the principles of treatment and most likely complications including scarring, bleeding, infection, swelling, pain, crusting, nerve damage, large wound,  incomplete excision, wound dehiscence,  nerve damage, recurrence, and a second procedure may be recommended to obtain the best cosmetic or functional result.    Informed consent was obtained and the patient underwent the procedure as follows:  The patient was placed supine on the operating table.  The cancer was identified, outlined with a marker, and verified by the patient.  The entire surgical field was prepped with Iodine.  The surgical site was anesthetized using Lidocaine 1% with epi 1:100,000.      The area of clinically apparent tumor was debulked. The layer of tissue was then surgically excised using a #15 blade and was then transferred onto a specimen sheet maintaining  the orientation of the specimen. Hemostasis was obtained using bipolar electrocoagulation. The wound site was then covered with a dressing while the tissue samples were processed for examination.    The excised tissue was transported to the [facility] histology laboratory maintaining the tissue orientation.  The tissue specimen was relaxed so that the entire surgical margin was in a a single horizontal plane for sectioning and inked for precise mapping.  A precise reference map was drawn to reflect the sectioning of the specimen, colored inking of the margins, and orientation on the patient. The tissue was processed using horizontal sectioning of the base and continuous peripheral margins.  The histopathologic sections were reviewed in conjunction with the reference map.    Total blocks: 1    Total slides:  2    There were no cancer cells visualized on examination, therefore Mohs surgery was complete.    PROCEDURE: Rhombic Transposition Flap  The patient was taken to the operative suite and placed on the operating room table.  The wound was identified and infiltrated with Lidocaine 1% with epi 1:100,000.  The defect was then cleansed and prepped with Iodine and draped with sterile drapes.  Using a marker, a rhomboid transposition flap repair was planned.  The wound edges were then debeveled and the wound was undermined bluntly in all directions.  The transposition flap was incised sharply to the level of fat.  The flap was undermined from all surrounding tissue. Hemostasis was obtained with electrocautery.  The flap was transposed into the primary defect.  The secondary defect and flap closed with deep dermal 5-0 monocryl sutures Epidermal tissue was carefully approximated using simple running 5-0 fast absorbing gut epidermal sutures throughout the length of the flap.  Redundant skin was excised by the triangulation technique, and closed in similar fashion.  The wound was cleansed with sterile saline and polysporin was  applied. A sterile non-adherent pressure dressing was placed.  The patient left the operating suite in stable condition.  The patient will return for wound check in 2 weeks. Wound care was reviewed verbally and in writing. Anticipate Dermabrasion to be used as a second stage of this reconstruction.     Follow up in 2 weeks via virtual visit.    Scribe Disclosure:   I, Juaquin Espinal, am serving as a scribe to document services personally performed by this physician, Dr. Yordy Fong, based on data collection and the provider's statements to me.     Provider Disclosure:   The documentation recorded by the scribe accurately reflects the services I personally performed and the decisions made by me.  I personally performed the procedures today.    Yordy Fong DO    Department of Dermatology  [facility]: Phone: [phone], Fax:[phone]  [facility]: Phone: [phone], Fax: [phone]    Care and Laboratory Testing Performed at:  [facility]   Dermatology Clinic  [address] [place] [address]      Again, thank you for allowing me to participate in the care of your patient.        Sincerely,        Yordy Fong MD

## 2022-01-31 NOTE — LETTER
[facility]  [address] [facility] [address]  Phone: [phone]  Fax: [phone]    January 31, 2022        Ryan Briseno  [address] [facility] [address]          To whom it may concern:    RE: Ryan Briseno    Patient was seen and treated today at our clinic and missed work.  Patient may return to work 2/2/22 with the following:  Light duty- Avoid lifting more than 20 lbs and avoid lowering head below the heart until 2/4/22. Please allow him to ice his right eye hourly if swelling becomes bothersome.     Please contact me for questions or concerns.      Sincerely,        Yordy Fong DO    Department of Dermatology  [facility]: Phone: [phone], Fax:[phone]  [facility]: Phone: [phone], Fax: [phone]

## 2022-01-31 NOTE — PATIENT INSTRUCTIONS
Mohs Wound Care Instructions  I will experience scar, altered skin color, bleeding, swelling, pain, crusting and redness. I may experience altered sensation. Risks are excessive bleeding, infection, muscle weakness, thick (hypertrophic or keloidal) scar, and recurrence. A second procedure may be recommended to obtain the best cosmetic or functional result.  Possible complications of any surgical procedure are bleeding, infection, scarring, alteration in skin color and sensation, muscle weakness in the area, wound dehiscence or seperation, or recurrence of the lesion or disease. On occasion, after healing, a secondary procedure or revision may be recommended in order to obtain the best cosmetic or functional result.   After your surgery, a pressure bandage will be placed over the area that has sutures. This will help prevent bleeding. Please follow these instructions as they will help you to prevent complications as your wound heals.  For the First 48 hours After Surgery:  1. Leave the pressure bandage on and keep it dry. If it should come loose, you may retape it, but do not take it off.  2. Relax and take it easy. Do not do any vigorous exercise, heavy lifting, or bending forward. This could cause the wound to bleed.  3. Post-operative pain is usually mild. You may take plain or extra strength Tylenol every 4 hours as needed (do not take more than 4,000mg in one day). Do not take any medicine that contains aspirin, ibuprofen or motrin unless you have been recommended these by a doctor.  Avoid alcohol and vitamin E as these may increase your tendency to bleed.  4. You may put an ice pack around the bandaged area for 20 minutes every 2-3 hours. This may help reduce swelling, bruising, and pain. Make sure the ice pack is waterproof so that the pressure bandage does not get wet.   5. You may see a small amount of drainage or blood on your pressure bandage. This is normal. However, if drainage or bleeding continues or  saturates the bandage, you will need to apply firm pressure over the bandage with a washcloth for 15 minutes. If bleeding continues after applying pressure for 15 minutes then go to the nearest emergency room.  48 Hours After Surgery  Carefully remove the bandage and start daily wound care and dressing changes. You may also now shower and get the wound wet.  Daily Wound Care:  1. Wash wound with a mild soap and water.  Use caution when washing the wound, be gentle and do not let the forceful shower stream hit the wound directly.  2. Pat dry.  3. Apply Vaseline (from a new container or tube) over the suture line with a Q-tip. It is very important to keep the wound continuously moist, as wounds heal best in a moist environment.  4. Keep the site covered until sutures are dissolved, you can cover it with a Telfa (non-stick) dressing and tape or a band-aid.    5. If you are unable to keep wound covered, you must apply Vaseline every 2-3 hours (while awake) to ensure it is being kept moist for optimal healing. A dressing overnight is recommended to keep the area moist.  Call Us If:  1. You have pain that is not controlled with Tylenol.  2. You have signs or symptoms of an infection, such as: fever over 100 degrees F, redness, warmth, or foul-smelling or yellow/creamy drainage from the wound.  Who should I call with questions?    [facility]: [phone]     [facility]: [phone]    For urgent needs outside of business hours call the [facility] at [phone] and ask to speak with the dermatology resident on call

## 2022-02-14 ENCOUNTER — MYC MEDICAL ADVICE (OUTPATIENT)
Dept: DERMATOLOGY | Facility: CLINIC | Age: 52
End: 2022-02-14

## 2022-02-14 ENCOUNTER — VIRTUAL VISIT (OUTPATIENT)
Dept: DERMATOLOGY | Facility: CLINIC | Age: 52
End: 2022-02-14
Payer: COMMERCIAL

## 2022-02-14 DIAGNOSIS — Z51.89 VISIT FOR WOUND CHECK: ICD-10-CM

## 2022-02-14 DIAGNOSIS — Z85.828 HISTORY OF BASAL CELL CARCINOMA OF SKIN: Primary | ICD-10-CM

## 2022-02-14 PROCEDURE — 99024 POSTOP FOLLOW-UP VISIT: CPT | Performed by: DERMATOLOGY

## 2022-02-14 NOTE — PROGRESS NOTES
[facility] Dermatology Note  Encounter Date: Feb 14, 2022  [employer] and Telephone ([phone]). Location of teledermatologist: [facility].  Start time: 1053. End time: 1058.    Dermatology Problem List:  1. BCC, right upper lip. Schedule Mohs (patient has deferred in spite of written communication risk letter).       BCC, R medial lower eyelid, s/p Mohs and rhombic flap 1/31/22    2. Acrochordon left upper eyelid              LN2 today  3. Rosacea, papular pustular type.     ____________________________________________    Assessment & Plan:     # BCC, R medial lower eyelid (photo is inverted making image appear to be left medial canthus).   S/P Mohs and Rhombic Flap Repair   Wound area remains swollen, but bruising going away.   Continue petroleum jelly until all top sutures dissolved and wound is healed over with new pink skin.   Schedule 3 month scar evaluation virtual.     Okay to start scar massage 1 month after surgery.   Scar massage for previous surgical site       One month after surgery, begin daily massages along the scar to loosen up fibrous tissue. Massage along the scar in circular motions with your fingertip and a little petroleum jelly, applying substantial pressure. Do this for 1-2 minutes, 5-10 times a day.          Procedures Performed:    None      Staff:     Yordy Fong DO    Department of Dermatology  [facility]: Phone: [phone], Fax:[phone]  [facility]: Phone: [phone], Fax: [phone]    ____________________________________________    CC: Surgical Followup ( 2 week wound check- right medial lower eyelid, s/p mohs 1/31)    HPI:  Mr. Ryan Briseno is a(n) 51 year old male who presents today for follow-up .  The skin near the corner of his eye remains swollen.  It is puffy compared to the left side.   He denies problems with vision and problems with eye dryness.     Patient is otherwise feeling well, without additional skin concerns.    Labs Reviewed:  N/A    Physical Exam:  Vitals: There were no vitals taken for this visit.  SKIN: Teledermatology photos were reviewed; image quality and interpretability: acceptable. Image date: 2/14/22.    -Geometric scar line of rhombic flap visible.  Skin edges remain pink and moderately edematous.  Edema appears to persist in the upper and lower eyelids.  However no erythema in the eyelids,     - No other lesions of concern on areas examined.     Medications:  Current Outpatient Medications   Medication     clindamycin (CLEOCIN) 300 MG capsule     No current facility-administered medications for this visit.

## 2022-02-14 NOTE — NURSING NOTE
Teledermatology Nurse Call Patients:     Are you in the [facility] at the time of the encounter? yes    Today's visit will be billed to you and your insurance.    A teledermatology visit is not as thorough as an in-person visit and the quality of the photograph sent may not be of the same quality as that taken by the dermatology clinic.

## 2022-02-14 NOTE — LETTER
2/14/2022         RE: Ryan Briseno  [address]  [place] [address]        Dear Colleague,    Thank you for referring your patient, Ryan Briseno, to the [facility]. Please see a copy of my visit note below.    [facility] Dermatology Note  Encounter Date: Feb 14, 2022  [employer] and Telephone ([phone]). Location of teledermatologist: [facility].  Start time: 1053. End time: 1058.    Dermatology Problem List:  1. BCC, right upper lip. Schedule Mohs (patient has deferred in spite of written communication risk letter).       BCC, R medial lower eyelid, s/p Mohs and rhombic flap 1/31/22    2. Acrochordon left upper eyelid              LN2 today  3. Rosacea, papular pustular type.     ____________________________________________    Assessment & Plan:     # BCC, R medial lower eyelid (photo is inverted making image appear to be left medial canthus).   S/P Mohs and Rhombic Flap Repair   Wound area remains swollen, but bruising going away.   Continue petroleum jelly until all top sutures dissolved and wound is healed over with new pink skin.   Schedule 3 month scar evaluation virtual.     Okay to start scar massage 1 month after surgery.   Scar massage for previous surgical site       One month after surgery, begin daily massages along the scar to loosen up fibrous tissue. Massage along the scar in circular motions with your fingertip and a little petroleum jelly, applying substantial pressure. Do this for 1-2 minutes, 5-10 times a day.          Procedures Performed:    None      Staff:     Yordy Fong DO    Department of Dermatology  [facility]: Phone: [phone], Fax:[phone]  [facility]: Phone: [phone], Fax: [phone]    ____________________________________________    CC: Surgical  Clinic Care Coordination Contact  [facility]/Voicemail       Clinical Data: Care Coordinator Outreach  Outreach attempted x 1.  Left message on patient's voicemail with call back information and requested return call.  Plan: Care Coordinator will try to reach patient again in 1-2 business days.    ESPERANZA Sheridan  [phone]  [facility]        Followup ( 2 week wound check- right medial lower eyelid, s/p mohs 1/31)    HPI:  Mr. Ryan Briseno is a(n) 51 year old male who presents today for follow-up .  The skin near the corner of his eye remains swollen.  It is puffy compared to the left side.  He denies problems with vision and problems with eye dryness.     Patient is otherwise feeling well, without additional skin concerns.    Labs Reviewed:  N/A    Physical Exam:  Vitals: There were no vitals taken for this visit.  SKIN: Teledermatology photos were reviewed; image quality and interpretability: acceptable. Image date: 2/14/22.    -Geometric scar line of rhombic flap visible.  Skin edges remain pink and moderately edematous.  Edema appears to persist in the upper and lower eyelids.  However no erythema in the eyelids,     - No other lesions of concern on areas examined.     Medications:  Current Outpatient Medications   Medication     clindamycin (CLEOCIN) 300 MG capsule     No current facility-administered medications for this visit.            Again, thank you for allowing me to participate in the care of your patient.        Sincerely,        Yordy Fong MD

## 2022-03-12 ASSESSMENT — ENCOUNTER SYMPTOMS
DIARRHEA: 0
CHILLS: 0
HEARTBURN: 0
DIZZINESS: 0
COUGH: 0
NAUSEA: 0
EYE PAIN: 0
SHORTNESS OF BREATH: 0
FEVER: 0
ARTHRALGIAS: 0
JOINT SWELLING: 0
PALPITATIONS: 0
NERVOUS/ANXIOUS: 0
FREQUENCY: 0
HEMATURIA: 0
SORE THROAT: 0
DYSURIA: 0
WEAKNESS: 0
HEMATOCHEZIA: 0
ABDOMINAL PAIN: 0
MYALGIAS: 0
CONSTIPATION: 0
PARESTHESIAS: 0
HEADACHES: 1

## 2022-03-15 ENCOUNTER — OFFICE VISIT (OUTPATIENT)
Dept: FAMILY MEDICINE | Facility: CLINIC | Age: 52
End: 2022-03-15
Payer: COMMERCIAL

## 2022-03-15 VITALS
DIASTOLIC BLOOD PRESSURE: 92 MMHG | HEART RATE: 77 BPM | SYSTOLIC BLOOD PRESSURE: 154 MMHG | HEIGHT: 70 IN | BODY MASS INDEX: 36.39 KG/M2 | TEMPERATURE: 98.8 F | WEIGHT: 254.2 LBS | OXYGEN SATURATION: 95 % | RESPIRATION RATE: 20 BRPM

## 2022-03-15 DIAGNOSIS — Z12.11 SCREEN FOR COLON CANCER: ICD-10-CM

## 2022-03-15 DIAGNOSIS — Z13.220 SCREENING FOR HYPERLIPIDEMIA: ICD-10-CM

## 2022-03-15 DIAGNOSIS — Z12.5 PROSTATE CANCER SCREENING: ICD-10-CM

## 2022-03-15 DIAGNOSIS — E66.01 MORBID OBESITY (H): ICD-10-CM

## 2022-03-15 DIAGNOSIS — R03.0 ELEVATED BP WITHOUT DIAGNOSIS OF HYPERTENSION: ICD-10-CM

## 2022-03-15 DIAGNOSIS — F17.200 SMOKER: ICD-10-CM

## 2022-03-15 DIAGNOSIS — Z00.00 ROUTINE GENERAL MEDICAL EXAMINATION AT A HEALTH CARE FACILITY: Primary | ICD-10-CM

## 2022-03-15 DIAGNOSIS — Z13.1 DIABETES MELLITUS SCREENING: ICD-10-CM

## 2022-03-15 LAB
ANION GAP SERPL CALCULATED.3IONS-SCNC: 5 MMOL/L (ref 3–14)
BUN SERPL-MCNC: 10 MG/DL (ref 7–30)
CALCIUM SERPL-MCNC: 9.8 MG/DL (ref 8.5–10.1)
CHLORIDE BLD-SCNC: 102 MMOL/L (ref 94–109)
CHOLEST SERPL-MCNC: 157 MG/DL
CO2 SERPL-SCNC: 30 MMOL/L (ref 20–32)
CREAT SERPL-MCNC: 1.08 MG/DL (ref 0.66–1.25)
FASTING STATUS PATIENT QL REPORTED: NO
GFR SERPL CREATININE-BSD FRML MDRD: 84 ML/MIN/1.73M2
GLUCOSE BLD-MCNC: 90 MG/DL (ref 70–99)
HBA1C MFR BLD: 5.6 % (ref 0–5.6)
HDLC SERPL-MCNC: 28 MG/DL
LDLC SERPL CALC-MCNC: 105 MG/DL
NONHDLC SERPL-MCNC: 129 MG/DL
POTASSIUM BLD-SCNC: 4.6 MMOL/L (ref 3.4–5.3)
PSA SERPL-MCNC: 0.96 UG/L (ref 0–4)
SODIUM SERPL-SCNC: 137 MMOL/L (ref 133–144)
TRIGL SERPL-MCNC: 120 MG/DL

## 2022-03-15 PROCEDURE — G0103 PSA SCREENING: HCPCS | Performed by: FAMILY MEDICINE

## 2022-03-15 PROCEDURE — 80061 LIPID PANEL: CPT | Performed by: FAMILY MEDICINE

## 2022-03-15 PROCEDURE — 99386 PREV VISIT NEW AGE 40-64: CPT | Performed by: FAMILY MEDICINE

## 2022-03-15 PROCEDURE — 36415 COLL VENOUS BLD VENIPUNCTURE: CPT | Performed by: FAMILY MEDICINE

## 2022-03-15 PROCEDURE — 80048 BASIC METABOLIC PNL TOTAL CA: CPT | Performed by: FAMILY MEDICINE

## 2022-03-15 PROCEDURE — 83036 HEMOGLOBIN GLYCOSYLATED A1C: CPT | Performed by: FAMILY MEDICINE

## 2022-03-15 ASSESSMENT — ENCOUNTER SYMPTOMS
PALPITATIONS: 0
PARESTHESIAS: 0
DIARRHEA: 0
EYE PAIN: 0
FREQUENCY: 0
CONSTIPATION: 0
SORE THROAT: 0
ARTHRALGIAS: 0
HEMATOCHEZIA: 0
NERVOUS/ANXIOUS: 0
HEARTBURN: 0
FEVER: 0
JOINT SWELLING: 0
MYALGIAS: 0
WEAKNESS: 0
SHORTNESS OF BREATH: 0
COUGH: 0
NAUSEA: 0
HEMATURIA: 0
DYSURIA: 0
HEADACHES: 1
ABDOMINAL PAIN: 0
CHILLS: 0
DIZZINESS: 0

## 2022-03-15 ASSESSMENT — PAIN SCALES - GENERAL: PAINLEVEL: NO PAIN (0)

## 2022-03-15 NOTE — PATIENT INSTRUCTIONS
Preventive Health Recommendations  Male Ages 50 - 64    Yearly exam:             See your health care provider every year in order to  o   Review health changes.   o   Discuss preventive care.    o   Review your medicines if your doctor has prescribed any.     Have a cholesterol test every 5 years, or more frequently if you are at risk for high cholesterol/heart disease.     Have a diabetes test (fasting glucose) every three years. If you are at risk for diabetes, you should have this test more often.     Have a colonoscopy at age 50, or have a yearly FIT test (stool test). These exams will check for colon cancer.      Talk with your health care provider about whether or not a prostate cancer screening test (PSA) is right for you.    You should be tested each year for STDs (sexually transmitted diseases), if you re at risk.     Shots: Get a flu shot each year. Get a tetanus shot every 10 years.     Nutrition:    Eat at least 5 servings of fruits and vegetables daily.     Eat whole-grain bread, whole-wheat pasta and brown rice instead of white grains and rice.     Get adequate Calcium and Vitamin D.     Lifestyle    Exercise for at least 150 minutes a week (30 minutes a day, 5 days a week). This will help you control your weight and prevent disease.     Limit alcohol to one drink per day.     No smoking.     Wear sunscreen to prevent skin cancer.     See your dentist every six months for an exam and cleaning.     See your eye doctor every 1 to 2 years.    Plan:    1) we will take a look today at other risk factors related to vascular disease (cholesterol, diabetes, kidney function)  2) great job on the changes you have made already.  Dietary changes can make a big difference.  In general I suggested diet high in protein implants, and low in simple carbohydrates.  3) perfectly fine to start exercise.  I always suggest not starting to intensive of a program right away because it can make you pretty sore and prone to  quit.  But what ever works for you, as long as it includes both cardiopulmonary training and some weight training.  That combination can work really well for cardiovascular health and weight loss.  4) we will also take a look at prostate cancer screening today and I will mail you a kit for colon cancer screening.

## 2022-03-15 NOTE — PROGRESS NOTES
SUBJECTIVE:   CC: Ryan Briseno is an 51 year old male who presents for preventative health visit.       Patient has been advised of split billing requirements and indicates understanding: Yes  Healthy Habits:     Getting at least 3 servings of Calcium per day:  NO    Bi-annual eye exam:  NO    Dental care twice a year:  NO    Sleep apnea or symptoms of sleep apnea:  Excessive snoring    Diet:  Other    Frequency of exercise:  None    Taking medications regularly:  Not Applicable    Medication side effects:  Not applicable    PHQ-2 Total Score: 0    Additional concerns today:  Yes      Pt has a history of hypertension, diet changes and has started to stop smoking. Has been monitoring BP daily for about a week.   Pt had a headache with tons of pressure behind his eyes in the end of Feb.   Pt would like to avoid BP medication if possible.     Today's PHQ-2 Score:   PHQ-2 ( 1999 [employer]) 3/12/2022   Q1: Little interest or pleasure in doing things 0   Q2: Feeling down, depressed or hopeless 0   PHQ-2 Score 0   PHQ-2 Total Score (12-17 Years)- Positive if 3 or more points; Administer PHQ-A if positive -   Q1: Little interest or pleasure in doing things Not at all   Q2: Feeling down, depressed or hopeless Not at all   PHQ-2 Score 0       Abuse: Current or Past(Physical, Sexual or Emotional)- No  Do you feel safe in your environment? Yes    Have you ever done Advance Care Planning? (For example, a Health Directive, POLST, or a discussion with a medical provider or your loved ones about your wishes): No, advance care planning information given to patient to review.  Patient declined advance care planning discussion at this time.    Social History     Tobacco Use     Smoking status: Current Every Day Smoker     Packs/day: 0.50     Smokeless tobacco: Never Used   Substance Use Topics     Alcohol use: Not Currently     Comment: not for 5 years      If you drink alcohol do you typically have >3 drinks per day or >7 drinks per  week? No    Alcohol Use 3/15/2022   Prescreen: >3 drinks/day or >7 drinks/week? -   Prescreen: >3 drinks/day or >7 drinks/week? No   No flowsheet data found.    Last PSA: No results found for: PSA    Reviewed orders with patient. Reviewed health maintenance and updated orders accordingly - Yes  Lab work is in process  BP Readings from Last 3 Encounters:   03/15/22 (!) 154/92   01/31/22 (!) 166/106    Wt Readings from Last 3 Encounters:   03/15/22 115.3 kg (254 lb 3.2 oz)                    Reviewed and updated as needed this visit by clinical staff   Tobacco  Allergies  Meds  Problems  Med Hx  Surg Hx  Fam Hx  Soc   Hx        Reviewed and updated as needed this visit by Provider   Tobacco  Allergies  Meds  Problems  Med Hx  Surg Hx  Fam Hx         Here today to establish care and talk about blood pressure.  Patient has not had a routine checkup in a while.  Was noticing some symptoms recently and started following his blood pressure at home and it was running about 150-160 systolic.  Made some significant changes in diet and cut way back on his smoking and he is already seeing home blood pressure readings drop by 10-20 points.  Says he is feeling better and sleeping better.  Wants to start an exercise program but wanted to make sure it was safe.    Review of Systems   Constitutional: Negative for chills and fever.   HENT: Negative for congestion, ear pain and sore throat.    Eyes: Positive for visual disturbance. Negative for pain.   Respiratory: Negative for cough and shortness of breath.    Cardiovascular: Negative for chest pain, palpitations and peripheral edema.   Gastrointestinal: Negative for abdominal pain, constipation, diarrhea, heartburn, hematochezia and nausea.   Genitourinary: Positive for impotence. Negative for dysuria, frequency, genital sores, hematuria, penile discharge and urgency.   Musculoskeletal: Negative for arthralgias, joint swelling and myalgias.   Skin: Negative for rash.  "  Neurological: Positive for headaches. Negative for dizziness, weakness and paresthesias.   Psychiatric/Behavioral: Negative for mood changes. The patient is not nervous/anxious.        OBJECTIVE:   BP (!) 154/92   Pulse 77   Temp 98.8  F (37.1  C) (Tympanic)   Resp 20   Ht 1.765 m (5' 9.5\")   Wt 115.3 kg (254 lb 3.2 oz)   SpO2 95%   BMI 37.00 kg/m      Physical Exam  GENERAL: healthy, alert and no distress  EYES: Eyes grossly normal to inspection, PERRL and conjunctivae and sclerae normal  HENT: ear canals and TM's normal, nose and mouth without ulcers or lesions  NECK: no adenopathy, no asymmetry, masses, or scars and thyroid normal to palpation  RESP: lungs clear to auscultation - no rales, rhonchi or wheezes  CV: regular rate and rhythm, normal S1 S2, no S3 or S4, no murmur, click or rub, no peripheral edema and peripheral pulses strong  ABDOMEN: soft, nontender, no hepatosplenomegaly, no masses and bowel sounds normal  MS: no gross musculoskeletal defects noted, no edema  SKIN: no suspicious lesions or rashes  NEURO: Normal strength and tone, mentation intact and speech normal  PSYCH: mentation appears normal, affect normal/bright    Diagnostic Test Results:  Labs reviewed in Epic    ASSESSMENT/PLAN:   (Z00.00) Routine general medical examination at a health care facility  (primary encounter diagnosis)  Comment: Discussed routine health maintenance and we will start down the road  Plan:     (R03.0) Elevated BP without diagnosis of hypertension  Comment: Has already made significant lifestyle changes and I provided some more information.  We will take a look at overall risk factors and help come up with a plan  Plan: Basic metabolic panel            (E66.01) Morbid obesity (H)  Comment: Working on weight loss  Plan: Hemoglobin A1c            (F17.200) Smoker  Comment: Working on cutting back or quitting smoking  Plan:     (Z13.220) Screening for hyperlipidemia  Comment:   Plan: Lipid panel reflex to " "direct LDL Non-fasting            (Z13.1) Diabetes mellitus screening  Comment:   Plan: Hemoglobin A1c            (Z12.5) Prostate cancer screening  Comment:   Plan: Prostate Specific Antigen Screen            (Z12.11) Screen for colon cancer  Comment:   Plan: COLOGMEGAN(EXACT SCIENCES)              Patient has been advised of split billing requirements and indicates understanding: Yes    COUNSELING:   Reviewed preventive health counseling, as reflected in patient instructions       Regular exercise       Healthy diet/nutrition       Vision screening       Colorectal cancer screening       Prostate cancer screening    Estimated body mass index is 37 kg/m  as calculated from the following:    Height as of this encounter: 1.765 m (5' 9.5\").    Weight as of this encounter: 115.3 kg (254 lb 3.2 oz).     Weight management plan: Discussed healthy diet and exercise guidelines    He reports that he has been smoking. He has been smoking about 0.50 packs per day. He has never used smokeless tobacco.  Tobacco Cessation Action Plan:   Self help information given to patient      Counseling Resources:  ATP IV Guidelines  Pooled Cohorts Equation Calculator  FRAX Risk Assessment  ICSI Preventive Guidelines  Dietary Guidelines for Americans, 2010  USDA's MyPlate  ASA Prophylaxis  Lung CA Screening    Mary Cline MD  [facility]  "

## 2022-03-20 ENCOUNTER — TRANSFERRED RECORDS (OUTPATIENT)
Dept: HEALTH INFORMATION MANAGEMENT | Facility: CLINIC | Age: 52
End: 2022-03-20
Payer: COMMERCIAL

## 2022-03-20 LAB — COLOGUARD-ABSTRACT: POSITIVE

## 2022-03-25 DIAGNOSIS — R19.5 POSITIVE COLORECTAL CANCER SCREENING USING COLOGUARD TEST: Primary | ICD-10-CM

## 2022-03-25 DIAGNOSIS — Z12.11 SCREENING FOR COLON CANCER: ICD-10-CM

## 2022-03-25 NOTE — RESULT ENCOUNTER NOTE
Dear Ryan Cline is out of the office and I am reviewing your results.    Your cologuard test was positive.  You will need to schedule a colonoscopy at [facility] ([phone]) formerly called [facility] as soon as possible.  Please call or MyChart my office with any questions or concerns.   Magdalena Pereyra, PAC

## 2022-03-28 ENCOUNTER — HOSPITAL ENCOUNTER (OUTPATIENT)
Facility: AMBULATORY SURGERY CENTER | Age: 52
End: 2022-03-28
Attending: INTERNAL MEDICINE
Payer: COMMERCIAL

## 2022-03-28 ENCOUNTER — TELEPHONE (OUTPATIENT)
Dept: GASTROENTEROLOGY | Facility: CLINIC | Age: 52
End: 2022-03-28
Payer: COMMERCIAL

## 2022-03-28 DIAGNOSIS — Z11.59 ENCOUNTER FOR SCREENING FOR OTHER VIRAL DISEASES: Primary | ICD-10-CM

## 2022-03-28 NOTE — TELEPHONE ENCOUNTER
Screening Questions  BlueKIND OF PREP RedLOCATION [review exclusion criteria] GreenSEDATION TYPE  1. Have you had a positive covid test in the last 90 days? N     2. Are you active on mychart? Y    3. What insurance is in the chart? PO     3.   Ordering/Referring Provider: Mary Cline MD    4. BMI 37.5 [BMI OVER 40-EXTENDED PREP]  If greater than 40 review exclusion criteria [PAC APPT IF @ [facility]]        5.  Respiratory Screening :  [If yes to any of the following HOSPITAL setting only]     Do you use daily home oxygen? N    Do you have mod to severe Obstructive Sleep Apnea? N  [OKAY @ [facility] SH PH RI]   Do you have Pulmonary Hypertension? N     Do you have UNCONTROLLED asthma? N        6.   Have you had a heart or lung transplant? N      7.   Are you currently on dialysis? N [ If yes, G-PREP & HOSPITAL setting only]     8.   Do you have chronic kidney disease? N [ If yes, G-PREP ]    9.   Have you had a stroke or Transient ischemic attack (TIA - aka  mini stroke ) within 6 months?  N (If yes, please review exclusion criteria)    10.   In the past 6 months, have you had any heart related issues including cardiomyopathy or heart attack? N           If yes, did it require cardiac stenting or other implantable device?       11.   Do you have any implantable devices in your body (pacemaker, defib, LVAD)? N (If yes, please review exclusion criteria)    12.   Do you take nitroglycerin? N           If yes, how often?   (if yes, HOSPITAL setting ONLY)    13.   Are you currently taking any blood thinners? N           [IF YES, INFORM PATIENT TO FOLLOW UP W/ ORDERING PROVIDER FOR BRIDGING INSTRUCTIONS]     14.   Do you have a diagnosis of diabetes? N   [ If yes, G-PREP ]    15.   [FEMALES] Are you currently pregnant?     If yes, how many weeks?     16.   Are you taking any prescription pain medications on a routine schedule?  N  [ If yes, EXTENDED PREP.] [If yes, MAC]    17.   Do you have any chemical  dependencies such as alcohol, street drugs, or methadone?  N [If yes, MAC]    18.   Do you have any history of post-traumatic stress syndrome, severe anxiety or history of psychosis?  N  [If yes, MAC]    19.   Do you have a significant intellectual disability?  N [If yes, MAC]    20.   Do you transfer independently?  Y    21.  On a regular basis do you go 3-5 days between bowel movements? N   [ If yes, EXTENDED PREP.]    22.   Preferred LOCAL Pharmacy for Pre Prescription   [id number] - [place], [place] - [address] AT [facility]      Scheduling Details      Caller :   (Please ask for phone number if not scheduled by patient)    Type of Procedure Scheduled: COLON  Which Colonoscopy Prep was Sent?: MPREP  SHI CF PATIENTS & GROEN'S PATIENTS NEEDS EXTENDED PREP  Surgeon: SAMUEL  Date of Procedure: 4/29  Location:       Sedation Type: CS  Conscious Sedation- Needs  for 6 hours after the procedure  MAC/General-Needs  for 24 hours after procedure    Pre-op Required at [facility], [place], [place] and OR for MAC sedation: N  (advise patient they will need a pre-op prior to procedure -)      Informed patient they will need an adult  Y  Cannot take any type of public or medical transportation alone    Pre-Procedure Covid test to be completed at [facility] or Externally: 4/25    Confirmed Nurse will call to complete assessment Y    Additional comments:

## 2022-03-29 ENCOUNTER — TELEPHONE (OUTPATIENT)
Dept: FAMILY MEDICINE | Facility: CLINIC | Age: 52
End: 2022-03-29
Payer: COMMERCIAL

## 2022-04-11 ENCOUNTER — TELEPHONE (OUTPATIENT)
Dept: GASTROENTEROLOGY | Facility: CLINIC | Age: 52
End: 2022-04-11
Payer: COMMERCIAL

## 2022-04-11 NOTE — TELEPHONE ENCOUNTER
Caller: Ryan    Procedure: colonoscopy    Date, Location, and Surgeon of Procedure Cancelled: 4/29, [facility] [place]    Ordering Provider:Marlyn    Reason for cancel (please be detailed, any staff messages or encounters to note?): transportation        Rescheduled: y     If rescheduled:    Date: 6/3   Location: [facility]   Prep Resent: miralax (changes to prep?)   Covid Test Rescheduled: Yes and No Yes   Note any change or update to original order/sedation: nc

## 2022-05-01 ENCOUNTER — APPOINTMENT (OUTPATIENT)
Dept: URGENT CARE | Facility: CLINIC | Age: 52
End: 2022-05-01
Payer: COMMERCIAL

## 2022-05-02 ENCOUNTER — NURSE TRIAGE (OUTPATIENT)
Dept: FAMILY MEDICINE | Facility: CLINIC | Age: 52
End: 2022-05-02
Payer: COMMERCIAL

## 2022-05-02 NOTE — TELEPHONE ENCOUNTER
Provider Response to 2nd Level Triage Request    I have reviewed the RN documentation. My recommendation is:  Face To Face Visit. Same Day: place patient on my schedule, as I have availability.

## 2022-05-02 NOTE — TELEPHONE ENCOUNTER
Pt scheduled in same day slot with Dr. Cline on Wednesday at 10:20am.     Advised to call back with any new or worsening symptoms - Pt verbalized understanding.     Mahsa Miranda RN, BSN  [facility]

## 2022-05-02 NOTE — TELEPHONE ENCOUNTER
Patient is calling requesting an appointment with Dr. Cline due to lower left abdominal pain.     Pain started last Friday. He has a colonoscopy scheduled in June but does not want to have to wait that long with this discomfort.     Pain does not radiate anywhere. He states his pain was pretty constant, but after having a BM it gets a little better.   He states it is not hurting right now, but when it is hurting it is bad. Rates it a 10/10 when it hurts.     Pt has history of hernias (believes he had one 20+ years ago). He has been taking Miralax as he has been constipated as well. He states this is unlike him, he usually has 2-3 BMs daily.     No vomiting, no diarrhea, no fever, no urinary problems. No blood in stool or urine.  He states his stomach is a little bloated.     Per protocol, Pt should be seen today due to ongoing pain. Advised PCP is booked. He is requesting to be seen this week by PCP - there is a same day slot open on Wednesday, can schedule if appropriate.     Routing to Dr. Cline for second level triage.     Pt requesting call back.     Thank you,   Mahsa Miranda RN, BSN  [facility]   Reason for Disposition    Patient wants to be seen    Additional Information    Negative: Passed out (i.e., fainted, collapsed and was not responding)    Negative: Shock suspected (e.g., cold/pale/clammy skin, too weak to stand, low BP, rapid pulse)    Negative: Sounds like a life-threatening emergency to the triager    Negative: Chest pain    Negative: Pain is mainly in upper abdomen (if needed ask: 'is it mainly above the belly button?')    Negative: SEVERE abdominal pain (e.g., excruciating)    Negative: Vomiting red blood or black (coffee ground) material    Negative: Bloody, black, or tarry bowel movements (Exception: chronic-unchanged black-grey bowel movements and is taking iron pills or Pepto-bismol)    Negative: Unable to urinate (or only a few drops) and bladder feels very full     "Negative: Pain in scrotum persists > 1 hour    Negative: Constant abdominal pain lasting > 2 hours    Negative: Vomiting bile (green color)    Negative: Patient sounds very sick or weak to the triager    Negative: Vomiting and abdomen looks much more swollen than usual    Negative: White of the eyes have turned yellow (i.e., jaundice)    Negative: Blood in urine (red, pink, or tea-colored)    Negative: Fever > 103 F (39.4 C)    Negative: Fever > 101 F (38.3 C) and over 60 years of age    Negative: Fever > 100.0 F (37.8 C) and has diabetes mellitus or a weak immune system (e.g., HIV positive, cancer chemotherapy, organ transplant, splenectomy, chronic steroids)    Negative: Fever > 100.0 F (37.8 C) and bedridden (e.g., nursing home patient, stroke, chronic illness, recovering from surgery)    Answer Assessment - Initial Assessment Questions  1. LOCATION: \"Where does it hurt?\"       Lower left abdominal pain. Originally across whole abdomen, now is localized to lower left.    2. RADIATION: \"Does the pain shoot anywhere else?\" (e.g., chest, back)      No  3. ONSET: \"When did the pain begin?\" (Minutes, hours or days ago)       2 days ago.   4. SUDDEN: \"Gradual or sudden onset?\"      Pretty sudden.   5. PATTERN \"Does the pain come and go, or is it constant?\"     - If constant: \"Is it getting better, staying the same, or worsening?\"       (Note: Constant means the pain never goes away completely; most serious pain is constant and it progresses)      - If intermittent: \"How long does it last?\" \"Do you have pain now?\"      (Note: Intermittent means the pain goes away completely between bouts)      Pain was constant. Now it kind of comes and goes, depends on moving around or sitting or having BM.   6. SEVERITY: \"How bad is the pain?\"  (e.g., Scale 1-10; mild, moderate, or severe)     - MILD (1-3): doesn't interfere with normal activities, abdomen soft and not tender to touch      - MODERATE (4-7): interferes with normal " "activities or awakens from sleep, tender to touch      - SEVERE (8-10): excruciating pain, doubled over, unable to do any normal activities        Can feel it now but it does not hurt, he did not have BM this morning so more discomfort. When it does hurt it is about a 10/10.   7. RECURRENT SYMPTOM: \"Have you ever had this type of abdominal pain before?\" If so, ask: \"When was the last time?\" and \"What happened that time?\"       Pt had history of abdominal pain, had hernia 20+ years ago.   8. CAUSE: \"What do you think is causing the abdominal pain?\"      Unknown.   9. RELIEVING/AGGRAVATING FACTORS: \"What makes it better or worse?\" (e.g., movement, antacids, bowel movement)      Pt has some relief with bowel movement.   10. OTHER SYMPTOMS: \"Has there been any vomiting, diarrhea, constipation, or urine problems?\"          Constipation (Pt states this is very unusual for him). Pt has bloating, reports that when he presses on it, it hurts. No N/V. Slight headache. No issues urinating.    Protocols used: ABDOMINAL PAIN - MALE-A-OH    "

## 2022-05-04 ENCOUNTER — OFFICE VISIT (OUTPATIENT)
Dept: FAMILY MEDICINE | Facility: CLINIC | Age: 52
End: 2022-05-04
Payer: COMMERCIAL

## 2022-05-04 VITALS
OXYGEN SATURATION: 100 % | DIASTOLIC BLOOD PRESSURE: 89 MMHG | HEIGHT: 70 IN | HEART RATE: 88 BPM | BODY MASS INDEX: 36.03 KG/M2 | RESPIRATION RATE: 18 BRPM | WEIGHT: 251.7 LBS | SYSTOLIC BLOOD PRESSURE: 146 MMHG | TEMPERATURE: 98.9 F

## 2022-05-04 DIAGNOSIS — R10.32 ABDOMINAL PAIN, LEFT LOWER QUADRANT: Primary | ICD-10-CM

## 2022-05-04 DIAGNOSIS — R03.0 ELEVATED BP WITHOUT DIAGNOSIS OF HYPERTENSION: ICD-10-CM

## 2022-05-04 DIAGNOSIS — K59.00 CONSTIPATION, UNSPECIFIED CONSTIPATION TYPE: ICD-10-CM

## 2022-05-04 PROCEDURE — 99213 OFFICE O/P EST LOW 20 MIN: CPT | Performed by: FAMILY MEDICINE

## 2022-05-04 ASSESSMENT — ENCOUNTER SYMPTOMS: ABDOMINAL PAIN: 1

## 2022-05-04 ASSESSMENT — PAIN SCALES - GENERAL: PAINLEVEL: NO PAIN (0)

## 2022-05-04 NOTE — PROGRESS NOTES
Assessment & Plan     Abdominal pain, left lower quadrant  Currently resolved issue.  Patient contacted me about lower abdominal pain a few days ago.  Started 5 days ago after he missed what would be his typical morning bowel movement.  And he did not have a bowel movement the following day and over the course of the next day or 2 he developed some significant left lower quadrant pain.  Tender to touch even.  No nausea.  No fever or chills or sense of illness.  No previous history of constipation.  Started taking some over-the-counter stool softeners and laxatives and that the time he contacted me things had not really been worked much but since then he has had copious bowel movements and as of today he had a normal morning bowel movement and really has no discomfort whatsoever.  Exam unremarkable.  So currently I do not necessarily think this fits with diverticulitis but if symptoms do return would recommend some lab work and a CT scan.  The other concern is his positive Cologuard -upcoming colonoscopy in about a month.  This likely is unrelated, but still a consideration.  So we agreed to sit on things for now and he will contact me if they do not stay the same.    Constipation, unspecified constipation type  As above    Elevated blood pressure -Home blood pressure readings have been fantastic.  Perfectly normal.         See Patient Instructions    Return in about 1 week (around 5/11/2022) for If not improving as expected, TapMetricshart message.    Mary Cline MD  [facility]    Dimitri Davis is a 51 year old who presents for the following health issues     Abdominal Pain     History of Present Illness       Reason for visit:  Lower abdominal pain  Symptom onset:  3-7 days ago  Symptoms include:  Constipation and pain  Symptom progression:  Improving  Had these symptoms before:  No  What makes it worse:  Sitting at my work desk and getting up and down from sitting position  What  "makes it better:  Not moving    He eats 2-3 servings of fruits and vegetables daily.He consumes 0 sweetened beverage(s) daily.He exercises with enough effort to increase his heart rate 9 or less minutes per day.  He exercises with enough effort to increase his heart rate 3 or less days per week.          Here today with some left lower quadrant abdominal pain as above.  Actually has improved and as of today is no more symptoms.    Review of Systems   Gastrointestinal: Positive for abdominal pain.      Constitutional, HEENT, cardiovascular, pulmonary, gi and gu systems are negative, except as otherwise noted.      Objective    BP (!) 146/89 (BP Location: Right arm, Patient Position: Sitting, Cuff Size: Adult Large)   Pulse 88   Temp 98.9  F (37.2  C) (Tympanic)   Resp 18   Ht 1.765 m (5' 9.5\")   Wt 114.2 kg (251 lb 11.2 oz)   SpO2 100%   BMI 36.64 kg/m    Body mass index is 36.64 kg/m .  Physical Exam   Alert, pleasant, upbeat, and in no apparent discomfort.  S1 and S2 normal, no murmurs, clicks, gallops or rubs. Regular rate and rhythm. Chest is clear; no wheezes or rales. No edema or JVD.  The abdomen is soft without tenderness, guarding, mass, rebound or organomegaly. Bowel sounds are normal. No CVA tenderness or inguinal adenopathy noted.  Past labs reviewed with the patient.                 "

## 2022-05-09 ENCOUNTER — MYC MEDICAL ADVICE (OUTPATIENT)
Dept: DERMATOLOGY | Facility: CLINIC | Age: 52
End: 2022-05-09
Payer: COMMERCIAL

## 2022-05-09 ENCOUNTER — VIRTUAL VISIT (OUTPATIENT)
Dept: DERMATOLOGY | Facility: CLINIC | Age: 52
End: 2022-05-09
Payer: COMMERCIAL

## 2022-05-09 DIAGNOSIS — L90.5 FACIAL SCAR: ICD-10-CM

## 2022-05-09 DIAGNOSIS — Z85.828 HISTORY OF BASAL CELL CARCINOMA OF SKIN: Primary | ICD-10-CM

## 2022-05-09 PROCEDURE — 99024 POSTOP FOLLOW-UP VISIT: CPT | Mod: 95 | Performed by: DERMATOLOGY

## 2022-05-09 ASSESSMENT — PAIN SCALES - GENERAL: PAINLEVEL: NO PAIN (0)

## 2022-05-09 NOTE — PROGRESS NOTES
[facility] Dermatology Note  Encounter Date: May 9, 2022  Store-and-Forward and Telephone (home number). Location of teledermatologist: [facility].  Start time: 1300. End time: 1310.    Dermatology Problem List:  1. BCC, right upper lip. Schedule Mohs (patient has deferred in spite of written communication risk letter).       BCC, R medial lower eyelid, s/p Mohs and rhombic flap 1/31/22    2. Acrochordon left upper eyelid              LN2 today  3. Rosacea, papular pustular type.    ____________________________________________    Assessment & Plan:    # BCC, R medial lower eyelid (photo is inverted making image appear to be left medial canthus).   S/P Mohs and Rhombic Flap Repair   Moderate to severe webbing present on the right medial canthus.  Webbing is bothersome to the patient he would like to revise the scar when possible.   He has a screening colonoscopy in about 4 weeks.  He would like to get some issues addressed regarding that before scheduling scar revision surgery.   He will contact the office to schedule scar revision when he is ready.   He will continue scar massage in the meantime.    Follow-up: Schedule Scar Revision. Patient has untreated BCC on his upper lip too.     Staff and Scribe:   Yordy Fong DO    Department of Dermatology  [facility]: Phone: [phone], Fax:[phone]  [facility]: Phone: [phone], Fax: [phone]    ____________________________________________    CC: Wound Check (Right lower eyelid post MOHS )    HPI:  Mr. Ryan Briseno is a(n) 51 year old male who presents today for follow-up  for scar evaluation following Mohs surgery on the right medial canthus.  Rhombic transposition flap repair was used to resurface the wound.  As the scar has healed and formed, he has noticed it lifting off the solid  structures of his nose performing a web.  The web is distracting and annoying.  There is minimal pain and discomfort but he would prefer to have the contour flattened.     Last seen on 2/14/22 virtually for a wound check.    Patient is otherwise feeling well, without additional skin concerns.    Labs Reviewed:  N/A    Physical Exam:  Vitals: There were no vitals taken for this visit.  SKIN: Teledermatology photos were reviewed; image quality and interpretability: acceptable.   - Well healed surgical scar R medial canthus. Scar webbing present on the medial canthus/ lateral nasal root.   - No other lesions of concern on areas examined.     Medications:  No current outpatient medications on file.     No current facility-administered medications for this visit.      Past Medical History:   Patient Active Problem List   Diagnosis     Elevated BP without diagnosis of hypertension     Morbid obesity (H)     Smoker     Past Medical History:   Diagnosis Date     Hypertension         CC No referring provider defined for this encounter. on close of this encounter.

## 2022-05-09 NOTE — LETTER
5/9/2022         RE: Ryan Briseno  [address]  [facility] [address]        Dear Colleague,    Thank you for referring your patient, Ryan Briseno, to the [facility]. Please see a copy of my visit note below.    [facility] Dermatology Note  Encounter Date: May 9, 2022  Store-and-Forward and Telephone (home number). Location of teledermatologist: [facility].  Start time: 1300. End time: 1310.    Dermatology Problem List:  1. BCC, right upper lip. Schedule Mohs (patient has deferred in spite of written communication risk letter).       BCC, R medial lower eyelid, s/p Mohs and rhombic flap 1/31/22    2. Acrochordon left upper eyelid              LN2 today  3. Rosacea, papular pustular type.    ____________________________________________    Assessment & Plan:    # BCC, R medial lower eyelid (photo is inverted making image appear to be left medial canthus).   S/P Mohs and Rhombic Flap Repair   Moderate to severe webbing present on the right medial canthus.  Webbing is bothersome to the patient he would like to revise the scar when possible.   He has a screening colonoscopy in about 4 weeks.  He would like to get some issues addressed regarding that before scheduling scar revision surgery.   He will contact the office to schedule scar revision when he is ready.   He will continue scar massage in the meantime.    Follow-up: Schedule Scar Revision. Patient has untreated BCC on his upper lip too.     Staff and Scribe:   Yordy Fong DO    Department of Dermatology  [facility]: Phone: [phone], Fax:[phone]  [facility]: Phone: [phone], Fax: [phone]    ____________________________________________    CC: Wound Check (Right lower eyelid post MOHS )    HPI:  Mr. Ryan Briseno is a(n) 51 year old male who  presents today for follow-up  for scar evaluation following Mohs surgery on the right medial canthus.  Rhombic transposition flap repair was used to resurface the wound.  As the scar has healed and formed, he has noticed it lifting off the solid structures of his nose performing a web.  The web is distracting and annoying.  There is minimal pain and discomfort but he would prefer to have the contour flattened.     Last seen on 2/14/22 virtually for a wound check.    Patient is otherwise feeling well, without additional skin concerns.    Labs Reviewed:  N/A    Physical Exam:  Vitals: There were no vitals taken for this visit.  SKIN: Teledermatology photos were reviewed; image quality and interpretability: acceptable.   - Well healed surgical scar R medial canthus. Scar webbing present on the medial canthus/ lateral nasal root.   - No other lesions of concern on areas examined.     Medications:  No current outpatient medications on file.     No current facility-administered medications for this visit.      Past Medical History:   Patient Active Problem List   Diagnosis     Elevated BP without diagnosis of hypertension     Morbid obesity (H)     Smoker     Past Medical History:   Diagnosis Date     Hypertension         CC No referring provider defined for this encounter. on close of this encounter.      Again, thank you for allowing me to participate in the care of your patient.        Sincerely,        Yordy Fong MD

## 2022-05-26 DIAGNOSIS — Z11.59 ENCOUNTER FOR SCREENING FOR OTHER VIRAL DISEASES: Primary | ICD-10-CM

## 2022-05-27 ENCOUNTER — TELEPHONE (OUTPATIENT)
Dept: GASTROENTEROLOGY | Facility: CLINIC | Age: 52
End: 2022-05-27
Payer: COMMERCIAL

## 2022-05-27 NOTE — TELEPHONE ENCOUNTER
Caller: Ryan    Procedure: colonoscopy    Date, Location, and Surgeon of Procedure Cancelled: 6/3, Katherin BUCKLEY    Ordering Provider:Mary Cline    Reason for cancel (please be detailed, any staff messages or encounters to note?): wife's procedure conflict        Rescheduled: Y     If rescheduled:    Date: 7/29. Pt requested Friday   Location: , w/ Katherin, per pt request   Prep Resent: miralax (changes to prep?)   Covid Test Rescheduled: yMG   Note any change or update to original order/sedation: no, still CS

## 2022-06-20 NOTE — TELEPHONE ENCOUNTER
Caller: Ryan Briseno    Procedure: Colon  Procedure Cxld: 6/29, [facility] Pandey    Reason for cancel (please be detailed, any staff messages or encounters to note?): Patient still needs to push the procedure out due to his wife's upcoming surgery. She is his transportation and the recovery is quite extensive.    Rescheduled: Y  10/21, [facility], Katherin   Conscious sedation  COVID test: @ Home    Patient req a Friday with Katherin.

## 2022-07-18 ENCOUNTER — TELEPHONE (OUTPATIENT)
Dept: GASTROENTEROLOGY | Facility: CLINIC | Age: 52
End: 2022-07-18

## 2022-07-18 NOTE — TELEPHONE ENCOUNTER
Patient received reminder for up coming pre-procedure COVID-19 test but had rescheduled Colonoscopy and so needed to reschedule COVID-19 test as well. COVID-19 test rescheduled per patient request.

## 2022-10-05 ENCOUNTER — TELEPHONE (OUTPATIENT)
Dept: GASTROENTEROLOGY | Facility: CLINIC | Age: 52
End: 2022-10-05

## 2022-10-05 NOTE — TELEPHONE ENCOUNTER
CANCEL - RESCHEDULE    Ordering Provider: Mary Cline MD  Procedure Cancelled: COLON  Procedure Date Cancelled: 10/21  Surgeon of Procedure Cancelled: CARMEN  Sedation type: MOD   Location of Procedure Cancelled: [facility]      Rescheduled: YES     If rescheduled:    Date: 12/30   Location: [facility]   Sedation Type: MOD   PAC / Pre-op Required  N   PREP TYPE: GPREP   Covid Test [ESSC - PCR IN FACILITY] HOME   Note any change or update to original order/sedation:    Preferred LOCAL Pharmacy for Pre Prescription         Reason for cancel (please be detailed, any staff messages or encounters to note? SCHED ERROR? FACILITY/ SEDATION CHANGE? ):   RIDE CONFLICT       Details and Prep sent via [employer]    Additional details:  REQUEST FRIDAYS, AFTERNOONS OR LATER, CARMEN. PT KNOWS ARRIVAL TIMES IS SUBJECT TO CHANGE

## 2022-12-22 RX ORDER — BISACODYL 5 MG
TABLET, DELAYED RELEASE (ENTERIC COATED) ORAL
Qty: 4 TABLET | Refills: 0 | Status: SHIPPED | OUTPATIENT
Start: 2022-12-22 | End: 2023-05-10

## 2022-12-29 ENCOUNTER — ANESTHESIA EVENT (OUTPATIENT)
Dept: SURGERY | Facility: AMBULATORY SURGERY CENTER | Age: 52
End: 2022-12-29
Payer: COMMERCIAL

## 2022-12-29 ASSESSMENT — LIFESTYLE VARIABLES: TOBACCO_USE: 1

## 2022-12-29 NOTE — ANESTHESIA PREPROCEDURE EVALUATION
Anesthesia Pre-Procedure Evaluation    Patient: Ryan Briseno   MRN: [id number] : 1970        Procedure : Procedure(s):  COLONOSCOPY, WITH CO2 INSUFFLATION          Past Medical History:   Diagnosis Date     Hypertension       History reviewed. No pertinent surgical history.   Allergies   Allergen Reactions     Penicillins Hives      Social History     Tobacco Use     Smoking status: Every Day     Packs/day: 0.50     Types: Cigarettes     Smokeless tobacco: Never     Tobacco comments:     Have smoked foe 30yrs and tried to quit 3 times.   Substance Use Topics     Alcohol use: Not Currently     Comment: not for 5 years       Wt Readings from Last 1 Encounters:   22 114.2 kg (251 lb 11.2 oz)        Anesthesia Evaluation   Pt has not had prior anesthetic         ROS/MED HX  ENT/Pulmonary:     (+) tobacco use, Current use,     Neurologic:  - neg neurologic ROS     Cardiovascular:     (+) hypertension-----    METS/Exercise Tolerance:     Hematologic:  - neg hematologic  ROS     Musculoskeletal:  - neg musculoskeletal ROS     GI/Hepatic:  - neg GI/hepatic ROS     Renal/Genitourinary:  - neg Renal ROS     Endo: Comment: Morbid    (+) Obesity,     Psychiatric/Substance Use:  - neg psychiatric ROS     Infectious Disease:       Malignancy:       Other:            Physical Exam    Airway  airway exam normal           Respiratory Devices and Support         Dental  no notable dental history         Cardiovascular   cardiovascular exam normal          Pulmonary   pulmonary exam normal                OUTSIDE LABS:  CBC:   Lab Results   Component Value Date    WBC 13.4 (H) 2008    HGB 14.3 2008    HCT 41.3 2008     2008     BMP:   Lab Results   Component Value Date     03/15/2022     2008    POTASSIUM 4.6 03/15/2022    POTASSIUM 3.7 2008    CHLORIDE 102 03/15/2022    CHLORIDE 106 2008    CO2 30 03/15/2022    CO2 24 2008    BUN 10 03/15/2022    BUN 18  02/26/2008    CR 1.08 03/15/2022    CR 1.21 02/26/2008    GLC 90 03/15/2022    GLC 97 02/26/2008     COAGS: No results found for: PTT, INR, FIBR  POC: No results found for: BGM, HCG, HCGS  HEPATIC: No results found for: ALBUMIN, PROTTOTAL, ALT, AST, GGT, ALKPHOS, BILITOTAL, BILIDIRECT, NOMAN  OTHER:   Lab Results   Component Value Date    A1C 5.6 03/15/2022    RAMSES 9.8 03/15/2022       Anesthesia Plan    ASA Status:  3      Anesthesia Type: MAC.     - Reason for MAC: straight local not clinically adequate   Induction: Propofol, Intravenous.   Maintenance: TIVA.        Consents    Anesthesia Plan(s) and associated risks, benefits, and realistic alternatives discussed. Questions answered and patient/representative(s) expressed understanding.     - Discussed: Risks, Benefits and Alternatives for BOTH SEDATION and the PROCEDURE were discussed     - Discussed with:  Patient      - Extended Intubation/Ventilatory Support Discussed: No.      - Patient is DNR/DNI Status: No    Use of blood products discussed: No .     Postoperative Care            Comments:                Tanvir Ellsworth MD

## 2022-12-30 ENCOUNTER — ANESTHESIA (OUTPATIENT)
Dept: SURGERY | Facility: AMBULATORY SURGERY CENTER | Age: 52
End: 2022-12-30
Payer: COMMERCIAL

## 2022-12-30 ENCOUNTER — HOSPITAL ENCOUNTER (OUTPATIENT)
Facility: AMBULATORY SURGERY CENTER | Age: 52
Discharge: HOME | End: 2022-12-30
Attending: INTERNAL MEDICINE
Payer: COMMERCIAL

## 2022-12-30 VITALS — HEART RATE: 52 BPM

## 2022-12-30 VITALS
OXYGEN SATURATION: 98 % | DIASTOLIC BLOOD PRESSURE: 86 MMHG | HEART RATE: 62 BPM | RESPIRATION RATE: 18 BRPM | TEMPERATURE: 97.8 F | SYSTOLIC BLOOD PRESSURE: 109 MMHG

## 2022-12-30 DIAGNOSIS — Z12.11 SPECIAL SCREENING FOR MALIGNANT NEOPLASMS, COLON: Primary | ICD-10-CM

## 2022-12-30 LAB — COLONOSCOPY: NORMAL

## 2022-12-30 PROCEDURE — G8907 PT DOC NO EVENTS ON DISCHARG: HCPCS

## 2022-12-30 PROCEDURE — G8918 PT W/O PREOP ORDER IV AB PRO: HCPCS

## 2022-12-30 PROCEDURE — 45385 COLONOSCOPY W/LESION REMOVAL: CPT

## 2022-12-30 PROCEDURE — 88305 TISSUE EXAM BY PATHOLOGIST: CPT | Performed by: PATHOLOGY

## 2022-12-30 PROCEDURE — 45380 COLONOSCOPY AND BIOPSY: CPT | Mod: XS

## 2022-12-30 RX ORDER — LIDOCAINE 40 MG/G
CREAM TOPICAL
Status: DISCONTINUED | OUTPATIENT
Start: 2022-12-30 | End: 2022-12-31 | Stop reason: HOSPADM

## 2022-12-30 RX ORDER — NALOXONE HYDROCHLORIDE 0.4 MG/ML
0.4 INJECTION, SOLUTION INTRAMUSCULAR; INTRAVENOUS; SUBCUTANEOUS
Status: DISCONTINUED | OUTPATIENT
Start: 2022-12-30 | End: 2022-12-31 | Stop reason: HOSPADM

## 2022-12-30 RX ORDER — NALOXONE HYDROCHLORIDE 0.4 MG/ML
0.2 INJECTION, SOLUTION INTRAMUSCULAR; INTRAVENOUS; SUBCUTANEOUS
Status: DISCONTINUED | OUTPATIENT
Start: 2022-12-30 | End: 2022-12-31 | Stop reason: HOSPADM

## 2022-12-30 RX ORDER — ONDANSETRON 2 MG/ML
4 INJECTION INTRAMUSCULAR; INTRAVENOUS
Status: DISCONTINUED | OUTPATIENT
Start: 2022-12-30 | End: 2022-12-31 | Stop reason: HOSPADM

## 2022-12-30 RX ORDER — ONDANSETRON 2 MG/ML
4 INJECTION INTRAMUSCULAR; INTRAVENOUS EVERY 6 HOURS PRN
Status: DISCONTINUED | OUTPATIENT
Start: 2022-12-30 | End: 2022-12-31 | Stop reason: HOSPADM

## 2022-12-30 RX ORDER — ONDANSETRON 4 MG/1
4 TABLET, ORALLY DISINTEGRATING ORAL EVERY 6 HOURS PRN
Status: DISCONTINUED | OUTPATIENT
Start: 2022-12-30 | End: 2022-12-31 | Stop reason: HOSPADM

## 2022-12-30 RX ORDER — LIDOCAINE HYDROCHLORIDE 20 MG/ML
INJECTION, SOLUTION INFILTRATION; PERINEURAL PRN
Status: DISCONTINUED | OUTPATIENT
Start: 2022-12-30 | End: 2022-12-30

## 2022-12-30 RX ORDER — PROPOFOL 10 MG/ML
INJECTION, EMULSION INTRAVENOUS PRN
Status: DISCONTINUED | OUTPATIENT
Start: 2022-12-30 | End: 2022-12-30

## 2022-12-30 RX ORDER — SODIUM CHLORIDE, SODIUM LACTATE, POTASSIUM CHLORIDE, CALCIUM CHLORIDE 600; 310; 30; 20 MG/100ML; MG/100ML; MG/100ML; MG/100ML
INJECTION, SOLUTION INTRAVENOUS CONTINUOUS PRN
Status: DISCONTINUED | OUTPATIENT
Start: 2022-12-30 | End: 2022-12-30

## 2022-12-30 RX ORDER — PROCHLORPERAZINE MALEATE 10 MG
10 TABLET ORAL EVERY 6 HOURS PRN
Status: DISCONTINUED | OUTPATIENT
Start: 2022-12-30 | End: 2022-12-31 | Stop reason: HOSPADM

## 2022-12-30 RX ORDER — FLUMAZENIL 0.1 MG/ML
0.2 INJECTION, SOLUTION INTRAVENOUS
Status: DISCONTINUED | OUTPATIENT
Start: 2022-12-30 | End: 2022-12-31 | Stop reason: HOSPADM

## 2022-12-30 RX ADMIN — SODIUM CHLORIDE, SODIUM LACTATE, POTASSIUM CHLORIDE, CALCIUM CHLORIDE: 600; 310; 30; 20 INJECTION, SOLUTION INTRAVENOUS at 13:05

## 2022-12-30 RX ADMIN — PROPOFOL 150 MCG/KG/MIN: 10 INJECTION, EMULSION INTRAVENOUS at 13:07

## 2022-12-30 RX ADMIN — PROPOFOL 100 MG: 10 INJECTION, EMULSION INTRAVENOUS at 13:08

## 2022-12-30 RX ADMIN — LIDOCAINE HYDROCHLORIDE 100 MG: 20 INJECTION, SOLUTION INFILTRATION; PERINEURAL at 13:07

## 2022-12-30 NOTE — ANESTHESIA POSTPROCEDURE EVALUATION
Patient: Ryan Briseno    Procedure: Procedure(s):  COLONOSCOPY, WITH CO2 INSUFFLATION  COLONOSCOPY, WITH POLYPECTOMY AND BIOPSY       Anesthesia Type:  MAC    Note:  Disposition: Outpatient   Postop Pain Control: Uneventful            Sign Out: Well controlled pain   PONV: No   Neuro/Psych: Uneventful            Sign Out: Acceptable/Baseline neuro status   Airway/Respiratory: Uneventful            Sign Out: Acceptable/Baseline resp. status   CV/Hemodynamics: Uneventful            Sign Out: Acceptable CV status; No obvious hypovolemia; No obvious fluid overload   Other NRE: NONE   DID A NON-ROUTINE EVENT OCCUR? No           Last vitals:  Vitals Value Taken Time   /80 12/30/22 1336   Temp     Pulse 87 12/30/22 1336   Resp 18 12/30/22 1336   SpO2 97 % 12/30/22 1336       Electronically Signed By: Tanvir Ellsworth MD  December 30, 2022  1:58 PM

## 2022-12-30 NOTE — H&P
ENDOSCOPY PRE-SEDATION H&P FOR OUTPATIENT PROCEDURES    Ryan Briseno  [id number]  1970    Procedure: colonoscopy    Pre-procedure diagnosis: positive cologuard    Past medical history:   Past Medical History:   Diagnosis Date     Hypertension        Past surgical history: History reviewed. No pertinent surgical history.    Current Outpatient Medications   Medication     bisacodyl (DULCOLAX) 5 MG EC tablet     polyethylene glycol (GOLYTELY) 236 g suspension     Current Facility-Administered Medications   Medication     lidocaine (LMX4) kit     lidocaine 1 % 0.1-1 mL     ondansetron (ZOFRAN) injection 4 mg     sodium chloride (PF) 0.9% PF flush 3 mL     sodium chloride (PF) 0.9% PF flush 3 mL       Allergies   Allergen Reactions     Penicillins Hives       History of Anesthesia/Sedation Problems: no    Physical Exam:    Mental status: alert  Heart: Normal  Lung: Normal  Assessment of patient's airway: Normal  Other as pertinent for procedure: None     ASA Score: See Provation note    Mallampati score:  I - Faucial pillars, soft palate, and uvula are visible    Assessment/Plan:     The patient is an appropriate candidate to receive sedation.    Informed consent was discussed with the patient/family, including the risks, benefits, potential complications and any alternative options associated with sedation.    Patient assessment completed just prior to sedation and while under constant observation by the provider. Condition determined to be adequate for proceeding with sedation.    The specific risks for the procedure were discussed with the patient at the time of informed consent and include but are not limited to perforation which could require surgery, missing significant neoplasm or lesion, hemorrhage and adverse sedative complication.      Tyler Pandey MD

## 2022-12-30 NOTE — ANESTHESIA CARE TRANSFER NOTE
Patient: Ryan Briseno    Procedure: Procedure(s):  COLONOSCOPY, WITH CO2 INSUFFLATION  COLONOSCOPY, WITH POLYPECTOMY AND BIOPSY       Diagnosis: Positive colorectal cancer screening using Cologuard test [R19.5]  Screening for colon cancer [Z12.11]  Diagnosis Additional Information: No value filed.    Anesthesia Type:   MAC     Note:    Oropharynx: oropharynx clear of all foreign objects and spontaneously breathing  Level of Consciousness: drowsy  Oxygen Supplementation: room air    Independent Airway: airway patency satisfactory and stable  Dentition: dentition unchanged  Vital Signs Stable: post-procedure vital signs reviewed and stable  Report to RN Given: handoff report given  Patient transferred to: Phase II    Handoff Report: Identifed the Patient, Identified the Reponsible Provider, Reviewed the pertinent medical history, Discussed the surgical course, Reviewed Intra-OP anesthesia mangement and issues during anesthesia, Set expectations for post-procedure period and Allowed opportunity for questions and acknowledgement of understanding      Vitals:  Vitals Value Taken Time   BP     Temp     Pulse 52 12/30/22 1325   Resp     SpO2         Electronically Signed By: GABRIELLE Austin CRNA  December 30, 2022  1:31 PM

## 2023-01-03 LAB
PATH REPORT.COMMENTS IMP SPEC: NORMAL
PATH REPORT.COMMENTS IMP SPEC: NORMAL
PATH REPORT.FINAL DX SPEC: NORMAL
PATH REPORT.GROSS SPEC: NORMAL
PATH REPORT.MICROSCOPIC SPEC OTHER STN: NORMAL
PATH REPORT.RELEVANT HX SPEC: NORMAL
PHOTO IMAGE: NORMAL

## 2023-01-05 ENCOUNTER — TELEPHONE (OUTPATIENT)
Dept: GASTROENTEROLOGY | Facility: CLINIC | Age: 53
End: 2023-01-05

## 2023-01-05 DIAGNOSIS — D36.9 TUBULOVILLOUS ADENOMA: Primary | ICD-10-CM

## 2023-01-05 NOTE — TELEPHONE ENCOUNTER
Pt informed of your message and would like to proceed with the colonoscopy- no call back is needed from you but states if anything comes up he'll call the office.

## 2023-01-05 NOTE — TELEPHONE ENCOUNTER
----- Message from Tyler Pandey MD sent at 1/5/2023 10:16 AM CST -----  Hi Team,  Can we let the patient know that his biopsy results have returned and I have reviewed them?  The large polyp in the colon showed it is a tubulovillous adenoma, which is a precancerous type of polyp.  I think this type of polyp can be removed with another colonoscopy at the [facility] where some additional tools are available.  I recommend that we set up colonoscopy with Dr. Jaffe there.  We can have their team reach out if he is in agreement for this.  If he needs additional clarification or questions, then I can give him a call later.  Thanks.  Tyler Pandey MD

## 2023-01-09 ENCOUNTER — PREP FOR PROCEDURE (OUTPATIENT)
Dept: GASTROENTEROLOGY | Facility: CLINIC | Age: 53
End: 2023-01-09

## 2023-01-09 ENCOUNTER — TELEPHONE (OUTPATIENT)
Dept: FAMILY MEDICINE | Facility: CLINIC | Age: 53
End: 2023-01-09

## 2023-01-09 ENCOUNTER — PATIENT OUTREACH (OUTPATIENT)
Dept: GASTROENTEROLOGY | Facility: CLINIC | Age: 53
End: 2023-01-09

## 2023-01-09 DIAGNOSIS — K63.5 POLYP OF ASCENDING COLON: Primary | ICD-10-CM

## 2023-01-09 RX ORDER — BISACODYL 5 MG
TABLET, DELAYED RELEASE (ENTERIC COATED) ORAL
Qty: 4 TABLET | Refills: 0 | Status: SHIPPED | OUTPATIENT
Start: 2023-01-09 | End: 2023-05-10

## 2023-01-09 NOTE — TELEPHONE ENCOUNTER
Reason for Call:  Appointment Request    Patient requesting this type of appt: Pre-op    Requested provider: Mary Cline    Reason patient unable to be scheduled: Not within requested timeframe    When does patient want to be seen/preferred time: Procedure is 3/17/2023    Comments: Only wants Dr Cline, any day or time    Could we send this information to you in [facility] or would you prefer to receive a phone call?:   Patient would prefer a phone call   Okay to leave a detailed message?: Yes at [phone]    Call taken on 1/9/2023 at 2:48 PM by Taylor Mcbride

## 2023-01-09 NOTE — TELEPHONE ENCOUNTER
Yes, that would be perfectly fine.  40-minute preferable and needs to be within 30 days of his procedure

## 2023-01-09 NOTE — PROGRESS NOTES
Called pt in response to referral from Dr Pandey.    Procedure/Imaging/Clinic: Colonoscopy with EMR, possible ESD, possible full-thickness resection   Physician: Ld   Timing: next available   Procedure length: 60 min   Anesthesia: MAC   Dx: ascending colon polyp   Tier: 3   Location: [facility] OR    Pt in agreement with 3/17/23, does want to be the last case of the day, with arrival of around noon d/t his wife's work schedule.     Explained they  will need a , someone to stay with them for 24 hours and should stay in town for 24 hours (within 45 min of Hospital) post procedure    Patient needs to get pre-op physical completed. If outside [facility] system will need physical faxed to number [phone]   If you do not get a preop physical, your procedure could be cancelled, patient voiced understanding*    Preop Plan: pt will schedule with Dr Cline within 30 days of procedure    Med Review    Blood thinner -  none  ASA - none  Diabetic - none    COVID test discussed: not required, unless positive     Patient Education r/t procedure: callit    A pre-op nurse will call 1-2 days prior to the procedure.    NPO/Prep: Golytely bowel prep pt is familiar       Verbalized understanding of all instructions. All questions answered.     Procedure order placed, message routed to OR       Janna Wells, RN, BSN,   [facility]  Care coordinator

## 2023-03-01 ENCOUNTER — OFFICE VISIT (OUTPATIENT)
Dept: FAMILY MEDICINE | Facility: CLINIC | Age: 53
End: 2023-03-01
Payer: COMMERCIAL

## 2023-03-01 VITALS
RESPIRATION RATE: 22 BRPM | DIASTOLIC BLOOD PRESSURE: 82 MMHG | SYSTOLIC BLOOD PRESSURE: 127 MMHG | WEIGHT: 249 LBS | HEART RATE: 78 BPM | TEMPERATURE: 98.7 F | HEIGHT: 70 IN | BODY MASS INDEX: 35.65 KG/M2

## 2023-03-01 DIAGNOSIS — D12.2 ADENOMATOUS POLYP OF ASCENDING COLON: ICD-10-CM

## 2023-03-01 DIAGNOSIS — Z01.818 PREOP GENERAL PHYSICAL EXAM: Primary | ICD-10-CM

## 2023-03-01 DIAGNOSIS — E66.01 MORBID OBESITY (H): ICD-10-CM

## 2023-03-01 DIAGNOSIS — R03.0 ELEVATED BP WITHOUT DIAGNOSIS OF HYPERTENSION: ICD-10-CM

## 2023-03-01 PROCEDURE — 99214 OFFICE O/P EST MOD 30 MIN: CPT | Performed by: FAMILY MEDICINE

## 2023-03-01 ASSESSMENT — PAIN SCALES - GENERAL: PAINLEVEL: NO PAIN (0)

## 2023-03-01 NOTE — PROGRESS NOTES
[address] [address]  Phone: [phone]  Primary Provider: Afshin Cline  Pre-op Performing Provider: AFSHIN CLINE      PREOPERATIVE EVALUATION:  Today's date: 3/1/2023    Ryan Briseno is a 52 year old male who presents for a preoperative evaluation.    Surgical Information:  Surgery/Procedure: COLONOSCOPY, WITH ENDOSCOPIC MUCOSAL RESECTION  Surgery Location: [facility]  Surgeon: Dr. Jaffe  Surgery Date: 03/17/2023  Time of Surgery: 4:20 PM  Where patient plans to recover: At home with family  Fax number for surgical facility: Note does not need to be faxed, will be available electronically in Epic.    Type of Anesthesia Anticipated: MAC    Assessment & Plan     The proposed surgical procedure is considered LOW risk.    Preop general physical exam    Adenomatous polyp of ascending colon  Large 35 mm polyp found on colonoscopy, now needing advanced resection.  Adenomatous.    Elevated BP without diagnosis of hypertension  Can have significantly elevated blood pressure in the office but home blood pressure readings are quite well controlled.    Morbid obesity (H)      Possible Sleep Apnea:  Based upon weight       Risks and Recommendations:  The patient has the following additional risks and recommendations for perioperative complications:   - No identified additional risk factors other than previously addressed    Medication Instructions:  Patient is on no chronic medications    RECOMMENDATION:  APPROVAL GIVEN to proceed with proposed procedure, without further diagnostic evaluation.    Interval history reviewed with patient and in his chart    Subjective     HPI related to upcoming procedure:   Cologuard positive last spring.  Follow-up colonoscopy showed 35 mm polyp in the ascending colon.  Biopsy revealed adenomatous polyp but unable to be fully resected due to location at that time.  Plan follow-up procedure for full  resection.    Preop Questions 2/22/2023   1. Have you ever had a heart attack or stroke? No   2. Have you ever had surgery on your heart or blood vessels, such as a stent placement, a coronary artery bypass, or surgery on an artery in your head, neck, heart, or legs? No   3. Do you have chest pain with activity? No   4. Do you have a history of  heart failure? No   5. Do you currently have a cold, bronchitis or symptoms of other infection? No   6. Do you have a cough, shortness of breath, or wheezing? No   7. Do you or anyone in your family have previous history of blood clots? No   8. Do you or does anyone in your family have a serious bleeding problem such as prolonged bleeding following surgeries or cuts? No   9. Have you ever had problems with anemia or been told to take iron pills? No   10. Have you had any abnormal blood loss such as black, tarry or bloody stools? No   11. Have you ever had a blood transfusion? No   12. Are you willing to have a blood transfusion if it is medically needed before, during, or after your surgery? Yes   13. Have you or any of your relatives ever had problems with anesthesia? No   14. Do you have sleep apnea, excessive snoring or daytime drowsiness? UNKNOWN - snoring   15. Do you have any artifical heart valves or other implanted medical devices like a pacemaker, defibrillator, or continuous glucose monitor? No   16. Do you have artificial joints? No   17. Are you allergic to latex? No       Health Care Directive:  Patient does not have a Health Care Directive or Living Will:     Preoperative Review of :   reviewed - no record of controlled substances prescribed.      Status of Chronic Conditions:  See problem list for active medical problems.  Problems all longstanding and stable, except as noted/documented.  See ROS for pertinent symptoms related to these conditions.      Review of Systems  CONSTITUTIONAL: NEGATIVE for fever, chills, change in weight  INTEGUMENTARY/SKIN:  "NEGATIVE for worrisome rashes, moles or lesions  EYES: NEGATIVE for vision changes or irritation  ENT/MOUTH: NEGATIVE for ear, mouth and throat problems  RESP: NEGATIVE for significant cough or SOB  CV: NEGATIVE for chest pain, palpitations or peripheral edema  GI: NEGATIVE for nausea, abdominal pain, heartburn, or change in bowel habits  : NEGATIVE for frequency, dysuria, or hematuria  MUSCULOSKELETAL: NEGATIVE for significant arthralgias or myalgia  NEURO: NEGATIVE for weakness, dizziness or paresthesias  ENDOCRINE: NEGATIVE for temperature intolerance, skin/hair changes  HEME: NEGATIVE for bleeding problems  PSYCHIATRIC: NEGATIVE for changes in mood or affect    Patient Active Problem List    Diagnosis Date Noted     Elevated BP without diagnosis of hypertension 03/15/2022     Priority: Medium     Morbid obesity (H) 03/15/2022     Priority: Medium     Smoker 03/15/2022     Priority: Medium      Past Medical History:   Diagnosis Date     Hypertension      Past Surgical History:   Procedure Laterality Date     COLONOSCOPY N/A 12/30/2022    Procedure: COLONOSCOPY, WITH POLYPECTOMY AND BIOPSY;  Surgeon: Tyler Pandey MD;  Location: [facility] OR     COLONOSCOPY N/A 12/30/2022    Procedure: COLONOSCOPY, FLEXIBLE, WITH LESION REMOVAL USING SNARE;  Surgeon: Tyler Pandey MD;  Location: [facility] OR     COLONOSCOPY WITH CO2 INSUFFLATION N/A 12/30/2022    Procedure: COLONOSCOPY, WITH CO2 INSUFFLATION;  Surgeon: Tyler Pandey MD;  Location: [facility] OR     Current Outpatient Medications   Medication Sig Dispense Refill     bisacodyl (DULCOLAX) 5 MG EC tablet Refer to \"Getting Ready for a Colonoscopy\" instruction handout 4 tablet 0     bisacodyl (DULCOLAX) 5 MG EC tablet Take 2 tablets at 3 pm the day before your procedure. If your procedure is before 11 am, take 2 additional tablets at 11 pm. If your procedure is after 11 am, take 2 additional tablets at 6 am. For additional instructions refer to " "your colonoscopy prep instructions. 4 tablet 0     polyethylene glycol (GOLYTELY) 236 g suspension The night before the exam at 6 pm drink an 8-ounce glass every 15 minutes until the jug is half empty. If you arrive before 11 AM: Drink the other half of the Golytely jug at 11 PM night before procedure. If you arrive after 11 AM: Drink the other half of the Golytely jug at 6 AM day of procedure. For additional instructions refer to your colonoscopy prep instructions. 4000 mL 0       Allergies   Allergen Reactions     Penicillins Hives        Social History     Tobacco Use     Smoking status: Every Day     Packs/day: 0.50     Years: 30.00     Pack years: 15.00     Types: Cigarettes     Smokeless tobacco: Never     Tobacco comments:     Have smoked foe 30yrs and tried to quit 3 times.   Substance Use Topics     Alcohol use: Not Currently     Comment: not for 5 years      Family History   Problem Relation Age of Onset     Hypertension Mother      Colon Cancer Mother      Cerebrovascular Disease Maternal Grandfather      Colon Cancer Paternal Grandfather      History   Drug Use Unknown     Comment: usage was back in my 20's and 30's.         Objective     /82   Pulse 78   Temp 98.7  F (37.1  C) (Tympanic)   Resp 22   Ht 1.778 m (5' 10\")   Wt 112.9 kg (249 lb)   BMI 35.73 kg/m      Physical Exam    GENERAL APPEARANCE: healthy, alert and no distress     EYES: EOMI,  PERRL     HENT: ear canals and TM's normal and nose and mouth without ulcers or lesions     NECK: no adenopathy, no asymmetry, masses, or scars and thyroid normal to palpation     RESP: lungs clear to auscultation - no rales, rhonchi or wheezes     CV: regular rates and rhythm, normal S1 S2, no S3 or S4 and no murmur, click or rub     ABDOMEN:  soft, nontender, no HSM or masses and bowel sounds normal     MS: extremities normal- no gross deformities noted, no evidence of inflammation in joints, FROM in all extremities.     SKIN: no suspicious " lesions or rashes     NEURO: Normal strength and tone, sensory exam grossly normal, mentation intact and speech normal     PSYCH: mentation appears normal. and affect normal/bright     LYMPHATICS: No cervical adenopathy    Recent Labs   Lab Test 03/15/22  1643      POTASSIUM 4.6   CR 1.08   A1C 5.6        Diagnostics:  No labs were ordered during this visit.   No EKG needed    Revised Cardiac Risk Index (RCRI):  The patient has the following serious cardiovascular risks for perioperative complications:   - No serious cardiac risks = 0 points     RCRI Interpretation: 0 points: Class I (very low risk - 0.4% complication rate)           Signed Electronically by: Mary Cline MD  Copy of this evaluation report is provided to requesting physician.

## 2023-03-01 NOTE — PATIENT INSTRUCTIONS
For informational purposes only. Not to replace the advice of your health care provider. Copyright   2003,  [facility] [facility]. All rights reserved. Clinically reviewed by Bernadette Bennett MD. Cswitch [id number] - REV .  Preparing for Your Surgery  Getting started  A nurse will call you to review your health history and instructions. They will give you an arrival time based on your scheduled surgery time. Please be ready to share:    Your doctor's clinic name and phone number    Your medical, surgical, and anesthesia history    A list of allergies and sensitivities    A list of medicines, including herbal treatments and over-the-counter drugs    Whether the patient has a legal guardian (ask how to send us the papers in advance)  Please tell us if you're pregnant--or if there's any chance you might be pregnant. Some surgeries may injure a fetus (unborn baby), so they require a pregnancy test. Surgeries that are safe for a fetus don't always need a test, and you can choose whether to have one.   If you have a child who's having surgery, please ask for a copy of Preparing for Your Child's Surgery.    Preparing for surgery    Within 10 to 30 days of surgery: Have a pre-op exam (sometimes called an H&P, or History and Physical). This can be done at a clinic or pre-operative center.  ? If you're having a , you may not need this exam. Talk to your care team.    At your pre-op exam, talk to your care team about all medicines you take. If you need to stop any medicines before surgery, ask when to start taking them again.  ? We do this for your safety. Many medicines can make you bleed too much during surgery. Some change how well surgery (anesthesia) drugs work.    Call your insurance company to let them know you're having surgery. (If you don't have insurance, call [phone].)    Call your clinic if there's any change in your health. This includes signs of a cold or flu (sore throat, runny nose,  cough, rash, fever). It also includes a scrape or scratch near the surgery site.    If you have questions on the day of surgery, call your hospital or surgery center.  Eating and drinking guidelines  For your safety: Unless your surgeon tells you otherwise, follow the guidelines below.    Eat and drink as usual until 8 hours before you arrive for surgery. After that, no food or milk.    Drink clear liquids until 2 hours before you arrive. These are liquids you can see through, like water, Gatorade, and Propel Water. They also include plain black coffee and tea (no cream or milk), candy, and breath mints. You can spit out gum when you arrive.    If you drink alcohol: Stop drinking it the night before surgery.    If your care team tells you to take medicine on the morning of surgery, it's okay to take it with a sip of water.  Preventing infection    Shower or bathe the night before and morning of your surgery. Follow the instructions your clinic gave you. (If no instructions, use regular soap.)    Don't shave or clip hair near your surgery site. We'll remove the hair if needed.    Don't smoke or vape the morning of surgery. You may chew nicotine gum up to 2 hours before surgery. A nicotine patch is okay.  ? Note: Some surgeries require you to completely quit smoking and nicotine. Check with your surgeon.    Your care team will make every effort to keep you safe from infection. We will:  ? Clean our hands often with soap and water (or an alcohol-based hand rub).  ? Clean the skin at your surgery site with a special soap that kills germs.  ? Give you a special gown to keep you warm. (Cold raises the risk of infection.)  ? Wear special hair covers, masks, gowns and gloves during surgery.  ? Give antibiotic medicine, if prescribed. Not all surgeries need antibiotics.  What to bring on the day of surgery    Photo ID and insurance card    Copy of your health care directive, if you have one    Glasses and hearing aids (bring  cases)  ? You can't wear contacts during surgery    Inhaler and eye drops, if you use them (tell us about these when you arrive)    CPAP machine or breathing device, if you use them    A few personal items, if spending the night    If you have . . .  ? A pacemaker, ICD (cardiac defibrillator) or other implant: Bring the ID card.  ? An implanted stimulator: Bring the remote control.  ? A legal guardian: Bring a copy of the certified (court-stamped) guardianship papers.  Please remove any jewelry, including body piercings. Leave jewelry and other valuables at home.  If you're going home the day of surgery    You must have a responsible adult drive you home. They should stay with you overnight as well.    If you don't have someone to stay with you, and you aren't safe to go home alone, we may keep you overnight. Insurance often won't pay for this.  After surgery  If it's hard to control your pain or you need more pain medicine, please call your surgeon's office.  Questions?   If you have any questions for your care team, list them here: _________________________________________________________________________________________________________________________________________________________________________ ____________________________________ ____________________________________ ____________________________________

## 2023-03-08 RX ORDER — LIDOCAINE 40 MG/G
CREAM TOPICAL
Status: CANCELLED | OUTPATIENT
Start: 2023-03-08

## 2023-03-08 RX ORDER — ONDANSETRON 2 MG/ML
4 INJECTION INTRAMUSCULAR; INTRAVENOUS
Status: CANCELLED | OUTPATIENT
Start: 2023-03-08

## 2023-03-13 ENCOUNTER — PATIENT OUTREACH (OUTPATIENT)
Dept: GASTROENTEROLOGY | Facility: CLINIC | Age: 53
End: 2023-03-13
Payer: COMMERCIAL

## 2023-03-13 NOTE — PROGRESS NOTES
Called patient to discuss timing of procedure with Dr. Jaffe on 3/17. Advised that the Pre-op team would be in touch to confirm 1-2 days prior to procedure and that times were subject to change. Reviewed recommendation to stop fiber supplementation 7 days prior to procedure.     Jessica Lopez, RN Care Coordinator

## 2023-03-17 ENCOUNTER — ANESTHESIA (OUTPATIENT)
Dept: SURGERY | Facility: CLINIC | Age: 53
End: 2023-03-17
Payer: COMMERCIAL

## 2023-03-17 ENCOUNTER — HOSPITAL ENCOUNTER (OUTPATIENT)
Facility: CLINIC | Age: 53
Discharge: HOME | End: 2023-03-17
Attending: INTERNAL MEDICINE | Admitting: INTERNAL MEDICINE
Payer: COMMERCIAL

## 2023-03-17 ENCOUNTER — ANESTHESIA EVENT (OUTPATIENT)
Dept: SURGERY | Facility: CLINIC | Age: 53
End: 2023-03-17
Payer: COMMERCIAL

## 2023-03-17 VITALS
BODY MASS INDEX: 34.85 KG/M2 | DIASTOLIC BLOOD PRESSURE: 74 MMHG | TEMPERATURE: 98.1 F | SYSTOLIC BLOOD PRESSURE: 114 MMHG | HEIGHT: 71 IN | WEIGHT: 248.9 LBS | OXYGEN SATURATION: 94 % | HEART RATE: 88 BPM | RESPIRATION RATE: 18 BRPM

## 2023-03-17 LAB — COLONOSCOPY: NORMAL

## 2023-03-17 PROCEDURE — 258N000003 HC RX IP 258 OP 636: Performed by: ANESTHESIOLOGY

## 2023-03-17 PROCEDURE — 272N000001 HC OR GENERAL SUPPLY STERILE: Performed by: INTERNAL MEDICINE

## 2023-03-17 PROCEDURE — 258N000003 HC RX IP 258 OP 636

## 2023-03-17 PROCEDURE — 999N000141 HC STATISTIC PRE-PROCEDURE NURSING ASSESSMENT: Performed by: INTERNAL MEDICINE

## 2023-03-17 PROCEDURE — 370N000017 HC ANESTHESIA TECHNICAL FEE, PER MIN: Performed by: INTERNAL MEDICINE

## 2023-03-17 PROCEDURE — 250N000011 HC RX IP 250 OP 636: Performed by: INTERNAL MEDICINE

## 2023-03-17 PROCEDURE — 250N000011 HC RX IP 250 OP 636

## 2023-03-17 PROCEDURE — 88305 TISSUE EXAM BY PATHOLOGIST: CPT | Mod: TC | Performed by: INTERNAL MEDICINE

## 2023-03-17 PROCEDURE — 88342 IMHCHEM/IMCYTCHM 1ST ANTB: CPT | Mod: 26 | Performed by: PATHOLOGY

## 2023-03-17 PROCEDURE — 250N000009 HC RX 250

## 2023-03-17 PROCEDURE — 250N000009 HC RX 250: Performed by: INTERNAL MEDICINE

## 2023-03-17 PROCEDURE — 360N000075 HC SURGERY LEVEL 2, PER MIN: Performed by: INTERNAL MEDICINE

## 2023-03-17 PROCEDURE — 710N000012 HC RECOVERY PHASE 2, PER MINUTE: Performed by: INTERNAL MEDICINE

## 2023-03-17 PROCEDURE — 88305 TISSUE EXAM BY PATHOLOGIST: CPT | Mod: 26 | Performed by: PATHOLOGY

## 2023-03-17 RX ORDER — HYDROMORPHONE HYDROCHLORIDE 1 MG/ML
0.2 INJECTION, SOLUTION INTRAMUSCULAR; INTRAVENOUS; SUBCUTANEOUS EVERY 5 MIN PRN
Status: CANCELLED | OUTPATIENT
Start: 2023-03-17

## 2023-03-17 RX ORDER — ONDANSETRON 2 MG/ML
4 INJECTION INTRAMUSCULAR; INTRAVENOUS EVERY 6 HOURS PRN
Status: CANCELLED | OUTPATIENT
Start: 2023-03-17

## 2023-03-17 RX ORDER — PROPOFOL 10 MG/ML
INJECTION, EMULSION INTRAVENOUS PRN
Status: DISCONTINUED | OUTPATIENT
Start: 2023-03-17 | End: 2023-03-17

## 2023-03-17 RX ORDER — FENTANYL CITRATE 50 UG/ML
25 INJECTION, SOLUTION INTRAMUSCULAR; INTRAVENOUS EVERY 5 MIN PRN
Status: CANCELLED | OUTPATIENT
Start: 2023-03-17

## 2023-03-17 RX ORDER — NALOXONE HYDROCHLORIDE 0.4 MG/ML
0.2 INJECTION, SOLUTION INTRAMUSCULAR; INTRAVENOUS; SUBCUTANEOUS
Status: CANCELLED | OUTPATIENT
Start: 2023-03-17

## 2023-03-17 RX ORDER — NALOXONE HYDROCHLORIDE 0.4 MG/ML
0.4 INJECTION, SOLUTION INTRAMUSCULAR; INTRAVENOUS; SUBCUTANEOUS
Status: CANCELLED | OUTPATIENT
Start: 2023-03-17

## 2023-03-17 RX ORDER — EPHEDRINE SULFATE 50 MG/ML
INJECTION, SOLUTION INTRAMUSCULAR; INTRAVENOUS; SUBCUTANEOUS PRN
Status: DISCONTINUED | OUTPATIENT
Start: 2023-03-17 | End: 2023-03-17

## 2023-03-17 RX ORDER — ONDANSETRON 4 MG/1
4 TABLET, ORALLY DISINTEGRATING ORAL EVERY 30 MIN PRN
Status: CANCELLED | OUTPATIENT
Start: 2023-03-17

## 2023-03-17 RX ORDER — FLUMAZENIL 0.1 MG/ML
0.2 INJECTION, SOLUTION INTRAVENOUS
Status: CANCELLED | OUTPATIENT
Start: 2023-03-17 | End: 2023-03-18

## 2023-03-17 RX ORDER — ONDANSETRON 2 MG/ML
4 INJECTION INTRAMUSCULAR; INTRAVENOUS EVERY 30 MIN PRN
Status: CANCELLED | OUTPATIENT
Start: 2023-03-17

## 2023-03-17 RX ORDER — PROPOFOL 10 MG/ML
INJECTION, EMULSION INTRAVENOUS CONTINUOUS PRN
Status: DISCONTINUED | OUTPATIENT
Start: 2023-03-17 | End: 2023-03-17

## 2023-03-17 RX ORDER — PROCHLORPERAZINE MALEATE 5 MG
10 TABLET ORAL EVERY 6 HOURS PRN
Status: CANCELLED | OUTPATIENT
Start: 2023-03-17

## 2023-03-17 RX ORDER — HYDROMORPHONE HYDROCHLORIDE 1 MG/ML
0.4 INJECTION, SOLUTION INTRAMUSCULAR; INTRAVENOUS; SUBCUTANEOUS EVERY 5 MIN PRN
Status: CANCELLED | OUTPATIENT
Start: 2023-03-17

## 2023-03-17 RX ORDER — ONDANSETRON 2 MG/ML
INJECTION INTRAMUSCULAR; INTRAVENOUS PRN
Status: DISCONTINUED | OUTPATIENT
Start: 2023-03-17 | End: 2023-03-17

## 2023-03-17 RX ORDER — FENTANYL CITRATE 50 UG/ML
50 INJECTION, SOLUTION INTRAMUSCULAR; INTRAVENOUS EVERY 5 MIN PRN
Status: CANCELLED | OUTPATIENT
Start: 2023-03-17

## 2023-03-17 RX ORDER — SODIUM CHLORIDE, SODIUM LACTATE, POTASSIUM CHLORIDE, CALCIUM CHLORIDE 600; 310; 30; 20 MG/100ML; MG/100ML; MG/100ML; MG/100ML
INJECTION, SOLUTION INTRAVENOUS CONTINUOUS
Status: CANCELLED | OUTPATIENT
Start: 2023-03-17

## 2023-03-17 RX ORDER — LIDOCAINE 40 MG/G
CREAM TOPICAL
Status: DISCONTINUED | OUTPATIENT
Start: 2023-03-17 | End: 2023-03-17 | Stop reason: HOSPADM

## 2023-03-17 RX ORDER — LIDOCAINE HYDROCHLORIDE 20 MG/ML
INJECTION, SOLUTION INFILTRATION; PERINEURAL PRN
Status: DISCONTINUED | OUTPATIENT
Start: 2023-03-17 | End: 2023-03-17

## 2023-03-17 RX ORDER — GLYCOPYRROLATE 0.2 MG/ML
INJECTION, SOLUTION INTRAMUSCULAR; INTRAVENOUS PRN
Status: DISCONTINUED | OUTPATIENT
Start: 2023-03-17 | End: 2023-03-17

## 2023-03-17 RX ORDER — ONDANSETRON 4 MG/1
4 TABLET, ORALLY DISINTEGRATING ORAL EVERY 6 HOURS PRN
Status: CANCELLED | OUTPATIENT
Start: 2023-03-17

## 2023-03-17 RX ORDER — SODIUM CHLORIDE, SODIUM LACTATE, POTASSIUM CHLORIDE, CALCIUM CHLORIDE 600; 310; 30; 20 MG/100ML; MG/100ML; MG/100ML; MG/100ML
INJECTION, SOLUTION INTRAVENOUS CONTINUOUS
Status: DISCONTINUED | OUTPATIENT
Start: 2023-03-17 | End: 2023-03-17 | Stop reason: HOSPADM

## 2023-03-17 RX ADMIN — PROPOFOL 50 MG: 10 INJECTION, EMULSION INTRAVENOUS at 13:20

## 2023-03-17 RX ADMIN — PHENYLEPHRINE HYDROCHLORIDE 200 MCG: 10 INJECTION INTRAVENOUS at 13:56

## 2023-03-17 RX ADMIN — SODIUM CHLORIDE, POTASSIUM CHLORIDE, SODIUM LACTATE AND CALCIUM CHLORIDE: 600; 310; 30; 20 INJECTION, SOLUTION INTRAVENOUS at 13:20

## 2023-03-17 RX ADMIN — PROPOFOL 40 MG: 10 INJECTION, EMULSION INTRAVENOUS at 13:25

## 2023-03-17 RX ADMIN — PROPOFOL 50 MG: 10 INJECTION, EMULSION INTRAVENOUS at 13:50

## 2023-03-17 RX ADMIN — GLYCOPYRROLATE 0.2 MG: 0.2 INJECTION, SOLUTION INTRAMUSCULAR; INTRAVENOUS at 13:59

## 2023-03-17 RX ADMIN — GLYCOPYRROLATE 0.1 MG: 0.2 INJECTION, SOLUTION INTRAMUSCULAR; INTRAVENOUS at 13:52

## 2023-03-17 RX ADMIN — ONDANSETRON 4 MG: 2 INJECTION INTRAMUSCULAR; INTRAVENOUS at 14:02

## 2023-03-17 RX ADMIN — GLYCOPYRROLATE 0.1 MG: 0.2 INJECTION, SOLUTION INTRAMUSCULAR; INTRAVENOUS at 13:40

## 2023-03-17 RX ADMIN — Medication 10 MG: at 13:44

## 2023-03-17 RX ADMIN — Medication 5 MG: at 13:56

## 2023-03-17 RX ADMIN — PHENYLEPHRINE HYDROCHLORIDE 200 MCG: 10 INJECTION INTRAVENOUS at 13:52

## 2023-03-17 RX ADMIN — PROPOFOL 150 MCG/KG/MIN: 10 INJECTION, EMULSION INTRAVENOUS at 13:20

## 2023-03-17 RX ADMIN — LIDOCAINE HYDROCHLORIDE 100 MG: 20 INJECTION, SOLUTION INFILTRATION; PERINEURAL at 13:23

## 2023-03-17 RX ADMIN — PHENYLEPHRINE HYDROCHLORIDE 150 MCG: 10 INJECTION INTRAVENOUS at 13:32

## 2023-03-17 RX ADMIN — PHENYLEPHRINE HYDROCHLORIDE 200 MCG: 10 INJECTION INTRAVENOUS at 13:41

## 2023-03-17 ASSESSMENT — ACTIVITIES OF DAILY LIVING (ADL)
ADLS_ACUITY_SCORE: 35
ADLS_ACUITY_SCORE: 33

## 2023-03-17 ASSESSMENT — LIFESTYLE VARIABLES: TOBACCO_USE: 1

## 2023-03-17 NOTE — DISCHARGE INSTRUCTIONS
[facility]  Same-Day Surgery   Adult Discharge Orders & Instructions     For 24 hours after surgery    Get plenty of rest.  A responsible adult must stay with you for at least 24 hours after you leave the hospital.   Do not drive or use heavy equipment.  If you have weakness or tingling, don't drive or use heavy equipment until this feeling goes away.  Do not drink alcohol.  Avoid strenuous or risky activities.  Ask for help when climbing stairs.   You may feel lightheaded.  IF so, sit for a few minutes before standing.  Have someone help you get up.   If you have nausea (feel sick to your stomach): Drink only clear liquids such as apple juice, ginger ale, broth or 7-Up.  Rest may also help.  Be sure to drink enough fluids.  Move to a regular diet as you feel able.  You may have a slight fever. Call the doctor if your fever is over 100 F (37.7 C) (taken under the tongue) or lasts longer than 24 hours.  You may have a dry mouth, a sore throat, muscle aches or trouble sleeping.  These should go away after 24 hours.  Do not make important or legal decisions.   Call your doctor for any of the followin.  Signs of infection (fever, growing tenderness at the surgery site, a large amount of drainage or bleeding, severe pain, foul-smelling drainage, redness, swelling).    2. It has been over 8 to 10 hours since surgery and you are still not able to urinate (pass water).    3.  Headache for over 24 hours.      To contact a doctor, call Dr. Jaffe's office at [phone]  or:    '   [phone] and ask for the resident on call for   Gastroeneterology  (answered 24 hours a day)  '   Emergency Department:    [facility]: [phone]       (TTY for hearing impaired: [phone])

## 2023-03-17 NOTE — OP NOTE
COLONOSCOPY 03/17/2023  1:02 PM [address] [place] [address] [phone]     Endoscopy Department   _______________________________________________________________________________   Patient Name: Ryan Briseno             Procedure Date: 3/17/2023 1:02 PM   MRN: [id number]                       Account Number: [id number]   YOB: 1970              Admit Type: Outpatient   Age: 52                                Gender: Male   Note Status: Finalized                Attending MD: EZRA CHU MD   Pause for the Cause: time out performed Total Sedation Time:   _______________________________________________________________________________       Procedure:             Colonoscopy   Indications:           Therapeutic procedure for known colon polyp   Providers:             EZRA CHU MD   Referring MD:             Requesting Provider:   FLOR MORALES MD, AFSHIN TORRES MD   Medicines:             Monitored Anesthesia Care   Complications:         No immediate complications. Estimated blood loss:                          Minimal.   _______________________________________________________________________________   Procedure:             Pre-Anesthesia Assessment:                          - Prior to the procedure, a History and Physical was                          performed, and patient medications and allergies were                          reviewed. The patient is competent. The risks and                          benefits of the procedure and the sedation options and                          risks were discussed with the patient. All questions                          were answered and informed consent was obtained.                          Patient identification and proposed procedure were                          verified by the physician, the nurse, the                          anesthesiologist  and the anesthetist in the procedure                          room. Mental Status Examination: alert and oriented.                          Airway Examination: normal oropharyngeal airway and                          neck mobility. Respiratory Examination: clear to                          auscultation. CV Examination: normal. Prophylactic                          Antibiotics: The patient does not require prophylactic                          antibiotics. Prior Anticoagulants: The patient has                          taken no anticoagulant or antiplatelet agents. ASA                          Grade Assessment: II - A patient with mild systemic                          disease. After reviewing the risks and benefits, the                          patient was deemed in satisfactory condition to                          undergo the procedure. The anesthesia plan was to use                          monitored anesthesia care (MAC). Immediately prior to                          administration of medications, the patient was                          re-assessed for adequacy to receive sedatives. The                          heart rate, respiratory rate, oxygen saturations,                          blood pressure, adequacy of pulmonary ventilation, and                          response to care were monitored throughout the                          procedure. The physical status of the patient was                          re-assessed after the procedure.                          After obtaining informed consent, the colonoscope was                          passed under direct vision. Throughout the procedure,                          the patient's blood pressure, pulse, and oxygen                          saturations were monitored continuously. The Olympus                          adult colonoscope was introduced through the anus and                          advanced to the cecum, identified by appendiceal                           orifice and ileocecal valve. The colonoscopy was                          performed without difficulty. The patient tolerated                          the procedure well. The quality of the bowel                          preparation was evaluated using the BBPS ([place] Bowel                          Preparation Scale) with scores of: Right Colon = 3,                          Transverse Colon = 3 and Left Colon = 3 (entire mucosa                          seen well with no residual staining, small fragments                          of stool or opaque liquid). The total BBPS score                          equals 9.                                                                                     Findings:        The perianal and digital rectal examinations were normal. Pertinent        negatives include no palpable rectal lesions.        A 35 mm polyp was found in the ascending colon. The polyp was Sylvia        classification Is (protruding, sessile). Preparations were made for        mucosal resection. Hextend with methylene blue was injected to raise the        lesion. Snare mucosal resection was performed. Resection and retrieval        were complete. To prevent bleeding after mucosal resection, three        hemostatic clips were successfully placed (MR conditional). There was no        bleeding at the end of the procedure. Verification of patient        identification for the specimen was done by the physician and nurse        using the patient's name, birth date and medical record number.        Estimated blood loss was minimal.        One 4 mm mucosal nodule was found in the sigmoid colon likely at the        site of recent polypectomy. The polyp/nodule was removed with a cold        snare. Resection and retrieval were complete. Verification of patient        identification for the specimen was done by the physician and nurse        using the patient's name, birth date and medical record number.         Estimated blood loss was minimal.        The exam was otherwise without abnormality on direct and retroflexion        views.                                                                                     Impression:            - One 35 mm polyp in the ascending colon (Sylvia Is,                          JNET 2b features), removed en bloc with mucosal                          resection. Resected and retrieved. Clips (MR                          conditional) were placed.                          - Slight nodularity in the sigmoid. Likely at the site                          of recent prior polypectomy and just granulation                          tissue. Cold snare resected to ensure no residual                          adenoma. Likely note.                          - The examination was otherwise normal on direct and                          retroflexion views.   Recommendation:        - Discharge patient to home (ambulatory).                          - Await pathology results.                          - Repeat colonoscopy in 3 years for surveillance based                          on pathology results.                          - Resume diet and medications today                                                                                       Jos Jaffe MD

## 2023-03-17 NOTE — ANESTHESIA PREPROCEDURE EVALUATION
Anesthesia Pre-Procedure Evaluation    Patient: Ryan Briseno   MRN: [id number] : 1970        Procedure : Procedure(s):  COLONOSCOPY, WITH ENDOSCOPIC MUCOSAL RESECTION  possible SUBMUCOSAL RESECTION and/or possible full thickness resection          Past Medical History:   Diagnosis Date     Hypertension       Past Surgical History:   Procedure Laterality Date     COLONOSCOPY N/A 2022    Procedure: COLONOSCOPY, WITH POLYPECTOMY AND BIOPSY;  Surgeon: Tyler Pandey MD;  Location: [facility]     COLONOSCOPY N/A 2022    Procedure: COLONOSCOPY, FLEXIBLE, WITH LESION REMOVAL USING SNARE;  Surgeon: Tyler Pandey MD;  Location: [facility]     COLONOSCOPY WITH CO2 INSUFFLATION N/A 2022    Procedure: COLONOSCOPY, WITH CO2 INSUFFLATION;  Surgeon: Tyler Pandey MD;  Location: [facility]      Allergies   Allergen Reactions     Penicillins Hives      Social History     Tobacco Use     Smoking status: Every Day     Packs/day: 0.50     Years: 30.00     Pack years: 15.00     Types: Cigarettes     Smokeless tobacco: Never     Tobacco comments:     Have smoked foe 30yrs and tried to quit 3 times.   Substance Use Topics     Alcohol use: Not Currently     Comment: not for 5 years       Wt Readings from Last 1 Encounters:   23 112.9 kg (249 lb)        Anesthesia Evaluation   Pt has had prior anesthetic. Type: MAC.        ROS/MED HX  ENT/Pulmonary:     (+) tobacco use (15 pack year smoking hx), Current use,     Neurologic:  - neg neurologic ROS     Cardiovascular:     (+) hypertension-----    METS/Exercise Tolerance:     Hematologic:  - neg hematologic  ROS     Musculoskeletal:  - neg musculoskeletal ROS     GI/Hepatic:  - neg GI/hepatic ROS     Renal/Genitourinary:  - neg Renal ROS     Endo:     (+) Obesity,     Psychiatric/Substance Use:  - neg psychiatric ROS     Infectious Disease:       Malignancy:       Other:            Physical Exam    Airway        Mallampati: III   TM  distance: > 3 FB   Neck ROM: full   Mouth opening: > 3 cm    Respiratory Devices and Support         Dental  no notable dental history     (+) Minor Abnormalities - some fillings, tiny chips      Cardiovascular   cardiovascular exam normal          Pulmonary   pulmonary exam normal                OUTSIDE LABS:  CBC:   Lab Results   Component Value Date    WBC 13.4 (H) 02/26/2008    HGB 14.3 02/26/2008    HCT 41.3 02/26/2008     02/26/2008     BMP:   Lab Results   Component Value Date     03/15/2022     02/26/2008    POTASSIUM 4.6 03/15/2022    POTASSIUM 3.7 02/26/2008    CHLORIDE 102 03/15/2022    CHLORIDE 106 02/26/2008    CO2 30 03/15/2022    CO2 24 02/26/2008    BUN 10 03/15/2022    BUN 18 02/26/2008    CR 1.08 03/15/2022    CR 1.21 02/26/2008    GLC 90 03/15/2022    GLC 97 02/26/2008     COAGS: No results found for: PTT, INR, FIBR  POC: No results found for: BGM, HCG, HCGS  HEPATIC: No results found for: ALBUMIN, PROTTOTAL, ALT, AST, GGT, ALKPHOS, BILITOTAL, BILIDIRECT, NOMAN  OTHER:   Lab Results   Component Value Date    A1C 5.6 03/15/2022    RAMSES 9.8 03/15/2022       Anesthesia Plan    ASA Status:  3   NPO Status:  NPO Appropriate    Anesthesia Type: MAC.     - Reason for MAC: straight local not clinically adequate   Induction: Propofol, Intravenous.   Maintenance: TIVA.        Consents    Anesthesia Plan(s) and associated risks, benefits, and realistic alternatives discussed. Questions answered and patient/representative(s) expressed understanding.     - Discussed: Risks, Benefits and Alternatives for BOTH SEDATION and the PROCEDURE were discussed     - Discussed with:  Patient         Postoperative Care    Pain management: IV analgesics, Multi-modal analgesia.   PONV prophylaxis: Ondansetron (or other 5HT-3), Background Propofol Infusion     Comments:                    Ki Emerson MD

## 2023-03-17 NOTE — ANESTHESIA POSTPROCEDURE EVALUATION
Patient: Ryan Briseno    Procedure: Procedure(s):  COLONOSCOPY, WITH ENDOSCOPIC MUCOSAL RESECTION and biopsies  SUBMUCOSAL RESECTION       Anesthesia Type:  MAC    Note:  Disposition: Outpatient   Postop Pain Control: Uneventful            Sign Out: Well controlled pain   PONV: No   Neuro/Psych: Uneventful            Sign Out: Acceptable/Baseline neuro status   Airway/Respiratory: Uneventful            Sign Out: Acceptable/Baseline resp. status   CV/Hemodynamics: Uneventful            Sign Out: Acceptable CV status; No obvious hypovolemia; No obvious fluid overload   Other NRE: NONE   DID A NON-ROUTINE EVENT OCCUR? No           Last vitals:  Vitals Value Taken Time   BP     Temp     Pulse     Resp     SpO2         Electronically Signed By: Ki Emerson MD  March 17, 2023  2:34 PM

## 2023-03-17 NOTE — ANESTHESIA CARE TRANSFER NOTE
Patient: Ryan Briseno    Procedure: Procedure(s):  COLONOSCOPY, WITH ENDOSCOPIC MUCOSAL RESECTION and biopsies  SUBMUCOSAL RESECTION       Diagnosis: Polyp of ascending colon [K63.5]  Diagnosis Additional Information: No value filed.    Anesthesia Type:   MAC     Note:    Oropharynx: oropharynx clear of all foreign objects and spontaneously breathing  Level of Consciousness: awake  Oxygen Supplementation: room air    Independent Airway: airway patency satisfactory and stable  Dentition: dentition unchanged  Vital Signs Stable: post-procedure vital signs reviewed and stable  Report to RN Given: handoff report given  Patient transferred to: Phase II    Handoff Report: Identifed the Patient, Identified the Reponsible Provider, Reviewed the pertinent medical history, Discussed the surgical course, Reviewed Intra-OP anesthesia mangement and issues during anesthesia, Set expectations for post-procedure period and Allowed opportunity for questions and acknowledgement of understanding      Vitals:  Vitals Value Taken Time   /78    Temp     Pulse 83    Resp 14    SpO2 97%        Electronically Signed By: GABRIELLE Cervantes CRNA  March 17, 2023  2:19 PM

## 2023-03-22 NOTE — RESULT ENCOUNTER NOTE
I called Ryan and reviewed his pathology results from his recent colonoscopy. See colonoscopy report for details. Ascending colon polyp showed a <1 mm focus of adenocarcinoma with 0.5 mm invasion into the submucosa within a tubular adenoma (T1bsm1). Margins all negative. Negative for LVI, tumor budding, and perineural invasion. By my interpretation this is very low risk for residual and gabbi disease. I do recommend a CT chest/abd/pelvis and a CEA. I also recommended an opinion from colorectal surgery.     Hi Juarez/Sohail/Fredy,  Would you guys mind seeing this patient? I don't think he needs surgery but appreciate your all's thoughts. His mother had metastatic colon cancer FYI. Let me know if you want us to order the CT and CEA. Thanks    Jos Jaffe MD  [facility]  Division of Gastroenterology and Hepatology  [facility] [address] [place], [place] [address]

## 2023-03-23 ENCOUNTER — TELEPHONE (OUTPATIENT)
Dept: FAMILY MEDICINE | Facility: CLINIC | Age: 53
End: 2023-03-23
Payer: COMMERCIAL

## 2023-03-23 ENCOUNTER — PATIENT OUTREACH (OUTPATIENT)
Dept: SURGERY | Facility: CLINIC | Age: 53
End: 2023-03-23
Payer: COMMERCIAL

## 2023-03-23 DIAGNOSIS — C18.9 COLON CANCER (H): Primary | ICD-10-CM

## 2023-03-23 NOTE — PROGRESS NOTES
New Cancer    Referring/Requesting provider and Health care System: Dr. Jaffe       Indication/Diagnosis for consultation: Colon cancer     Imaging completed: no     CT scan: 3/25/2023 check in at 1pm     CEA: 3/28/2023 at 11:30am  Visit: 3/28/2023 check in time 12:15pm             Are images able/being pushed to our system? N/A    Colonoscopy Report: Yes         Pathology Report: Yes       Is patient aware of request for clinic consultation and ok to be contacted to schedule? Yes    FAX ALL RECORDS ATTENTION TO COLON AND RECTAL SURGERY -[phone]

## 2023-03-23 NOTE — TELEPHONE ENCOUNTER
I sent a message to the patient about this.  These orders will be coming from GI, not from me.  It is a more specialized thing not usually done from the primary care level.  I am not sure all of the lab work that would need to be ordered.

## 2023-03-23 NOTE — PROGRESS NOTES
Colon and Rectal Surgery Clinic Note    RE: Ryan Briseno.  : 1970.  JOYCE: 3/28/2023.    Reason for visit: colon cancer    HPI: Ryan Briseno is a 52 year old male who presents today for colon cancer. He underwent a colonoscopy on 2022 for a positive cologaurd test and was found to have a 35mm polyp in the ascending colon, 10cm from the IC valve. This was not resected, biopsies showed a tubulovillous adenoma. He was referred to Dr. Celia Jaffe for endoscopic resection which was completed on 3/17/2023. Pathology demonstrated a focal adenocarcinoma arising in a tubular adenoma. The focus of adenocarcinoma was less than 1mm and invaded into the submucosa, approximately 0.5mm (SM1). MMR staining was performed but the adenocarcinoma was not identified in the slides. Resection margins were negative. There was no LVI, PNI, or tumor budding. CT Chest/Abdomen/Pelvis showed a subcentimeter hepatic hypodensity, an enlarged portacaval node, and an indeterminate adrenal nodule. CEA pending.     Today Ryan feels well. He is anxious about his diagnosis as his mother passed from colon cancer. Normal diet and BMs. He is trying to be healthier with weight loss and exercise.       Colonoscopy (2022):  Impression:               - One 35 mm polyp in the ascending colon located                             approximately 10cm distal to the IC valve. There                             was a polypoid 1s portion but also IIa +C                             depression with possible NICE type III mucosal                             changes concerning for the possibilty of submucosal                             invasion. Not resected, biopsied.                             - Two 7 to 9 mm polyps in the transverse colon,                             removed with a cold snare. Resected and retrieved.                             - Two 5 to 7 mm polyps in the sigmoid colon,                             removed with a cold snare. Resected  and retrieved.                             - The distal rectum and anal verge are normal on                             retroflexion view.   Recommendation:           - Discharge patient to home (ambulatory).                             - Patient has a contact number available for                             emergencies. The signs and symptoms of potential                             delayed complications were discussed with the                             patient. Return to normal activities tomorrow.                             Written discharge instructions were provided to the                             patient.                             - Await pathology results.                             - Further recommendations regarding endoscopic vs                             surgical resection pending final biopsy results.                             - Return to primary care physician.             Surgical Pathology (12/30/2022):  A(1).  ASCENDING COLON POLYP:  - Tubulovillous adenoma; negative for high-grade dysplasia     B(2).  TRANSVERSE COLON POLYPS X2:  - Sessile serrated adenoma; negative for cytologic dysplasia  - Tubular adenoma; negative for high-grade dysplasia     C(3).  SIGMOID COLON POLYPS X2:  - Hyperplastic polyp(s)         Colonoscopy (3/17/2023):  Impression:            - One 35 mm polyp in the ascending colon (Sylvia Is,                          JNET 2b features), removed en bloc with mucosal                          resection. Resected and retrieved. Clips (MR                          conditional) were placed.                          - Slight nodularity in the sigmoid. Likely at the site                          of recent prior polypectomy and just granulation                          tissue. Cold snare resected to ensure no residual                          adenoma. Likely note.                          - The examination was otherwise normal on direct and                          retroflexion  views.   Recommendation:        - Discharge patient to home (ambulatory).                          - Await pathology results.                          - Repeat colonoscopy in 3 years for surveillance based                          on pathology results.                          - Resume diet and medications today                 Surgical Pathology (3/17/2023):  A. COLON, ASCENDING, POLYP:  - Focal adenocarcinoma arising in a tubular adenoma with high-grade dysplasia; see comment     B. COLON, SIGMOID, PRIOR POLYPECTOMY SITE, BIOPSY:  - Hyperplastic polyp  - Fragments of colonic mucosa with mild architectural distortion and reactive changes    Sections of ascending polyp show a pedunculated tubular adenoma with high-grade dysplasia and a small focus of invasive adenocarcinoma.  The focus is less than 1 mm and invades into the submucosa approximately 0.5 mm (Sm1).  The resection margin is negative.  Lymphovascular invasion, perineural invasion, or tumor budding are not identified.  The diagnosis is supported by multiple deeper levels and a keratin AE1/AE3 immunohistochemical stain.      CT Chest/Abdomen/Pelvis (3/25/2023):  IMPRESSION:   1. Subcentimeter hepatic hypodensity is too small to characterize.  Follow up MR of the liver is recommended to further evaluate.  2. Enlarged portacaval lymph node, reactive versus metastatic.  Attention on follow up.   3. Indeterminant 1.0 cm right adrenal nodule. This can be further  characterized on follow up MR.   4. No evidence of metastatic disease in the chest.   5. Multinodular thyroid. Consider follow up thyroid ultrasound for  further evaluation.       CEA (3/28/2023):  pending    Medical history:  1. HTN    Surgical history:  -none     Family history:  Family History   Problem Relation Age of Onset     Hypertension Mother      Colon Cancer Mother      Cerebrovascular Disease Maternal Grandfather      Colon Cancer Paternal Grandfather        Medications:  Current Outpatient  "Medications   Medication Sig Dispense Refill     bisacodyl (DULCOLAX) 5 MG EC tablet Refer to \"Getting Ready for a Colonoscopy\" instruction handout 4 tablet 0     bisacodyl (DULCOLAX) 5 MG EC tablet Take 2 tablets at 3 pm the day before your procedure. If your procedure is before 11 am, take 2 additional tablets at 11 pm. If your procedure is after 11 am, take 2 additional tablets at 6 am. For additional instructions refer to your colonoscopy prep instructions. 4 tablet 0     polyethylene glycol (GOLYTELY) 236 g suspension The night before the exam at 6 pm drink an 8-ounce glass every 15 minutes until the jug is half empty. If you arrive before 11 AM: Drink the other half of the Golytely jug at 11 PM night before procedure. If you arrive after 11 AM: Drink the other half of the Golytely jug at 6 AM day of procedure. For additional instructions refer to your colonoscopy prep instructions. 4000 mL 0       Allergies:  Allergies   Allergen Reactions     Penicillins Hives       Social history:   Social History     Tobacco Use     Smoking status: Every Day     Packs/day: 0.50     Years: 30.00     Pack years: 15.00     Types: Cigarettes     Smokeless tobacco: Never     Tobacco comments:     Have smoked foe 30yrs and tried to quit 3 times.   Substance Use Topics     Alcohol use: Not Currently     Comment: not for 5 years      Marital status: .    ROS:  A complete review of systems was performed with the patient and all systems negative except as per HPI.    Physical Examination:  Exam was chaperoned by Dr Ayala  /86 (BP Location: Right arm, Patient Position: Sitting, Cuff Size: Adult Large)   Pulse 89   SpO2 94%   General: Well hydrated. No acute distress.  CV: RRR  Lung: Non-labored breathing on RA  Abdomen: Soft, NT, obese habitus. No inguinal adenopathy palpated.  JERSON: Deferred      ASSESSMENT    This is a 52 year old M with a small focus of adenocarcinoma (1 mm) in the setting of a semipedunculated TA " with HGD, invading into the submucosa approximately 0.5 mm (Sm1).  The resection margin is negative. Lymphovascular invasion, perineural invasion, or tumor budding are not identified. Because of the low risk profile, observation with repeat colonoscopy, CT, and CEA in 1 year would be a reasonable approach. However, with T1 adenocarcinomas there remains a risk of ~10% LN mets, for which a colectomy would be indicated. This was clearly explained to the pt. All questions were answered. He is anxious because of his family history and would like more time to make his decision. At the same time, his staging CT revealed a subcentimeter hepatic hypodensity, an enlarged portacaval node, and an indeterminate adrenal nodule for which additional MRIs are indicated prior to finalize any plan. Risks, benefits, and alternatives of operative treatment were thoroughly discussed, he understands these well and agrees to proceed with additional testing.    All pertinent labs and imaging were personally reviewed by me.      PLAN  - MRI liver  - MRI adrenal glad vs CT with adrenal protocol  - Follow up in 2-4 weeks to discuss further steps based on the above testing  - healthy life style and weight loss were encouraged    60 minutes spent on the date of the encounter doing chart review, history and exam, imaging review, documentation and further activities as noted above.      Fredy Mancini MD    Division of Colon and Rectal Surgery  [facility]    Referring Provider:  No referring provider defined for this encounter.     Primary Care Provider:  Mary Cline

## 2023-03-23 NOTE — TELEPHONE ENCOUNTER
Patient is calling. He got his pathology results from his procedure at the [facility]-Dr Thakkar. He said that he needs lab work and a CT.Patient would like to get this going. Please advise.Cara Velásquez MA/EDILEBRTO

## 2023-03-23 NOTE — TELEPHONE ENCOUNTER
Diagnosis, Referred by & from: Colon Cancer   Appt date: 3/28/2023   NOTES STATUS DETAILS   OFFICE NOTE from referring provider N/A    OFFICE NOTE from other specialist Internal [facility]:  3/1/23, 5/4/22 - [facility] OV with Dr. Cline   DISCHARGE SUMMARY from hospital N/A    DISCHARGE REPORT from the ER N/A    OPERATIVE REPORT N/A    MEDICATION LIST Internal    LABS     ANAL PAP/CEA Internal [facility]:  ([facility]) 3/28/23 - CEA   BIOPSIES/PATHOLOGY RELATED TO DIAGNOSIS Internal [facility]:  3/17/23 - Colon Biopsy (Case: [id number])  12/30/22 - Colon Biopsy (Case: [id number])   DIAGNOSTIC PROCEDURES     COLONOSCOPY Internal [facility]:  3/17/23 - Colonoscopy  12/30/22 - Colonoscopy   IMAGING (DISC & REPORT)      CT Internal [facility]:  ([facility]) 3/25/23 - CT Chest/Abd/Pelvis

## 2023-03-24 ENCOUNTER — MYC MEDICAL ADVICE (OUTPATIENT)
Dept: GASTROENTEROLOGY | Facility: CLINIC | Age: 53
End: 2023-03-24
Payer: COMMERCIAL

## 2023-03-24 LAB
PATH REPORT.ADDENDUM SPEC: NORMAL
PATH REPORT.COMMENTS IMP SPEC: NORMAL
PATH REPORT.FINAL DX SPEC: NORMAL
PATH REPORT.GROSS SPEC: NORMAL
PATH REPORT.MICROSCOPIC SPEC OTHER STN: NORMAL
PATH REPORT.RELEVANT HX SPEC: NORMAL
PHOTO IMAGE: NORMAL

## 2023-03-24 NOTE — TELEPHONE ENCOUNTER
[employer] message sent to patient acknowledging that follow up with C/R had been initiated.     Suzy Lubin RN

## 2023-03-25 ENCOUNTER — ANCILLARY PROCEDURE (OUTPATIENT)
Dept: CT IMAGING | Facility: CLINIC | Age: 53
End: 2023-03-25
Attending: SURGERY
Payer: COMMERCIAL

## 2023-03-25 DIAGNOSIS — C18.9 COLON CANCER (H): ICD-10-CM

## 2023-03-25 PROCEDURE — 71260 CT THORAX DX C+: CPT | Mod: GC | Performed by: STUDENT IN AN ORGANIZED HEALTH CARE EDUCATION/TRAINING PROGRAM

## 2023-03-25 PROCEDURE — 74177 CT ABD & PELVIS W/CONTRAST: CPT | Mod: GC | Performed by: STUDENT IN AN ORGANIZED HEALTH CARE EDUCATION/TRAINING PROGRAM

## 2023-03-25 RX ORDER — IOPAMIDOL 755 MG/ML
135 INJECTION, SOLUTION INTRAVASCULAR ONCE
Status: COMPLETED | OUTPATIENT
Start: 2023-03-25 | End: 2023-03-25

## 2023-03-25 RX ADMIN — IOPAMIDOL 135 ML: 755 INJECTION, SOLUTION INTRAVASCULAR at 13:11

## 2023-03-28 ENCOUNTER — PRE VISIT (OUTPATIENT)
Dept: SURGERY | Facility: CLINIC | Age: 53
End: 2023-03-28

## 2023-03-28 ENCOUNTER — LAB (OUTPATIENT)
Dept: LAB | Facility: CLINIC | Age: 53
End: 2023-03-28
Payer: COMMERCIAL

## 2023-03-28 ENCOUNTER — OFFICE VISIT (OUTPATIENT)
Dept: SURGERY | Facility: CLINIC | Age: 53
End: 2023-03-28
Payer: COMMERCIAL

## 2023-03-28 VITALS — HEART RATE: 89 BPM | DIASTOLIC BLOOD PRESSURE: 86 MMHG | OXYGEN SATURATION: 94 % | SYSTOLIC BLOOD PRESSURE: 128 MMHG

## 2023-03-28 DIAGNOSIS — C18.9 COLON CANCER (H): ICD-10-CM

## 2023-03-28 DIAGNOSIS — E66.811 CLASS 1 OBESITY WITH SERIOUS COMORBIDITY AND BODY MASS INDEX (BMI) OF 34.0 TO 34.9 IN ADULT, UNSPECIFIED OBESITY TYPE: ICD-10-CM

## 2023-03-28 DIAGNOSIS — I15.9 SECONDARY HYPERTENSION: ICD-10-CM

## 2023-03-28 DIAGNOSIS — C18.2 MALIGNANT NEOPLASM OF ASCENDING COLON (H): Primary | ICD-10-CM

## 2023-03-28 LAB — CEA SERPL-MCNC: 2.2 NG/ML

## 2023-03-28 PROCEDURE — 36415 COLL VENOUS BLD VENIPUNCTURE: CPT | Performed by: PATHOLOGY

## 2023-03-28 PROCEDURE — 99000 SPECIMEN HANDLING OFFICE-LAB: CPT | Performed by: PATHOLOGY

## 2023-03-28 PROCEDURE — 82378 CARCINOEMBRYONIC ANTIGEN: CPT | Mod: 90 | Performed by: PATHOLOGY

## 2023-03-28 PROCEDURE — 99205 OFFICE O/P NEW HI 60 MIN: CPT | Performed by: SURGERY

## 2023-03-28 ASSESSMENT — PAIN SCALES - GENERAL: PAINLEVEL: NO PAIN (0)

## 2023-03-28 NOTE — LETTER
3/28/2023       RE: Ryan Briseno  [address]  [facility] [address]     Dear Colleague,    Thank you for referring your patient, Ryan Briseno, to the [facility] COLON AND RECTAL SURGERY CLINIC [place] at [facility]. Please see a copy of my visit note below.    Colon and Rectal Surgery Clinic Note    RE: Ryan Briseno.  : 1970.  JOYCE: 3/28/2023.    Reason for visit: colon cancer    HPI: Ryan Briseno is a 52 year old male who presents today for colon cancer. He underwent a colonoscopy on 2022 for a positive cologaurd test and was found to have a 35mm polyp in the ascending colon, 10cm from the IC valve. This was not resected, biopsies showed a tubulovillous adenoma. He was referred to Dr. Celia Jaffe for endoscopic resection which was completed on 3/17/2023. Pathology demonstrated a focal adenocarcinoma arising in a tubular adenoma. The focus of adenocarcinoma was less than 1mm and invaded into the submucosa, approximately 0.5mm (SM1). MMR staining was performed but the adenocarcinoma was not identified in the slides. Resection margins were negative. There was no LVI, PNI, or tumor budding. CT Chest/Abdomen/Pelvis showed a subcentimeter hepatic hypodensity, an enlarged portacaval node, and an indeterminate adrenal nodule. CEA pending.     Today Ryan feels well. He is anxious about his diagnosis as his mother passed from colon cancer. Normal diet and BMs. He is trying to be healthier with weight loss and exercise.       Colonoscopy (2022):  Impression:               - One 35 mm polyp in the ascending colon located                             approximately 10cm distal to the IC valve. There                             was a polypoid 1s portion but also IIa +C                             depression with possible NICE type III mucosal                             changes concerning for the possibilty of submucosal                              invasion. Not resected, biopsied.                             - Two 7 to 9 mm polyps in the transverse colon,                             removed with a cold snare. Resected and retrieved.                             - Two 5 to 7 mm polyps in the sigmoid colon,                             removed with a cold snare. Resected and retrieved.                             - The distal rectum and anal verge are normal on                             retroflexion view.   Recommendation:           - Discharge patient to home (ambulatory).                             - Patient has a contact number available for                             emergencies. The signs and symptoms of potential                             delayed complications were discussed with the                             patient. Return to normal activities tomorrow.                             Written discharge instructions were provided to the                             patient.                             - Await pathology results.                             - Further recommendations regarding endoscopic vs                             surgical resection pending final biopsy results.                             - Return to primary care physician.             Surgical Pathology (12/30/2022):  A(1).  ASCENDING COLON POLYP:  - Tubulovillous adenoma; negative for high-grade dysplasia     B(2).  TRANSVERSE COLON POLYPS X2:  - Sessile serrated adenoma; negative for cytologic dysplasia  - Tubular adenoma; negative for high-grade dysplasia     C(3).  SIGMOID COLON POLYPS X2:  - Hyperplastic polyp(s)         Colonoscopy (3/17/2023):  Impression:            - One 35 mm polyp in the ascending colon (Sylvia Is,                          JNET 2b features), removed en bloc with mucosal                          resection. Resected and retrieved. Clips (MR                          conditional) were placed.                          - Slight nodularity in the sigmoid. Likely  at the site                          of recent prior polypectomy and just granulation                          tissue. Cold snare resected to ensure no residual                          adenoma. Likely note.                          - The examination was otherwise normal on direct and                          retroflexion views.   Recommendation:        - Discharge patient to home (ambulatory).                          - Await pathology results.                          - Repeat colonoscopy in 3 years for surveillance based                          on pathology results.                          - Resume diet and medications today                 Surgical Pathology (3/17/2023):  A. COLON, ASCENDING, POLYP:  - Focal adenocarcinoma arising in a tubular adenoma with high-grade dysplasia; see comment     B. COLON, SIGMOID, PRIOR POLYPECTOMY SITE, BIOPSY:  - Hyperplastic polyp  - Fragments of colonic mucosa with mild architectural distortion and reactive changes    Sections of ascending polyp show a pedunculated tubular adenoma with high-grade dysplasia and a small focus of invasive adenocarcinoma.  The focus is less than 1 mm and invades into the submucosa approximately 0.5 mm (Sm1).  The resection margin is negative.  Lymphovascular invasion, perineural invasion, or tumor budding are not identified.  The diagnosis is supported by multiple deeper levels and a keratin AE1/AE3 immunohistochemical stain.      CT Chest/Abdomen/Pelvis (3/25/2023):  IMPRESSION:   1. Subcentimeter hepatic hypodensity is too small to characterize.  Follow up MR of the liver is recommended to further evaluate.  2. Enlarged portacaval lymph node, reactive versus metastatic.  Attention on follow up.   3. Indeterminant 1.0 cm right adrenal nodule. This can be further  characterized on follow up MR.   4. No evidence of metastatic disease in the chest.   5. Multinodular thyroid. Consider follow up thyroid ultrasound for  further evaluation.       CEA  "(3/28/2023):  pending    Medical history:  1. HTN    Surgical history:  -none     Family history:  Family History   Problem Relation Age of Onset     Hypertension Mother      Colon Cancer Mother      Cerebrovascular Disease Maternal Grandfather      Colon Cancer Paternal Grandfather        Medications:  Current Outpatient Medications   Medication Sig Dispense Refill     bisacodyl (DULCOLAX) 5 MG EC tablet Refer to \"Getting Ready for a Colonoscopy\" instruction handout 4 tablet 0     bisacodyl (DULCOLAX) 5 MG EC tablet Take 2 tablets at 3 pm the day before your procedure. If your procedure is before 11 am, take 2 additional tablets at 11 pm. If your procedure is after 11 am, take 2 additional tablets at 6 am. For additional instructions refer to your colonoscopy prep instructions. 4 tablet 0     polyethylene glycol (GOLYTELY) 236 g suspension The night before the exam at 6 pm drink an 8-ounce glass every 15 minutes until the jug is half empty. If you arrive before 11 AM: Drink the other half of the Golytely jug at 11 PM night before procedure. If you arrive after 11 AM: Drink the other half of the Golytely jug at 6 AM day of procedure. For additional instructions refer to your colonoscopy prep instructions. 4000 mL 0       Allergies:  Allergies   Allergen Reactions     Penicillins Hives       Social history:   Social History     Tobacco Use     Smoking status: Every Day     Packs/day: 0.50     Years: 30.00     Pack years: 15.00     Types: Cigarettes     Smokeless tobacco: Never     Tobacco comments:     Have smoked foe 30yrs and tried to quit 3 times.   Substance Use Topics     Alcohol use: Not Currently     Comment: not for 5 years      Marital status: .    ROS:  A complete review of systems was performed with the patient and all systems negative except as per HPI.    Physical Examination:  Exam was chaperoned by Dr Ayala  /86 (BP Location: Right arm, Patient Position: Sitting, Cuff Size: Adult Large) "   Pulse 89   SpO2 94%   General: Well hydrated. No acute distress.  CV: RRR  Lung: Non-labored breathing on RA  Abdomen: Soft, NT, obese habitus. No inguinal adenopathy palpated.  JERSON: Deferred      ASSESSMENT    This is a 52 year old M with a small focus of adenocarcinoma (1 mm) in the setting of a semipedunculated TA with HGD, invading into the submucosa approximately 0.5 mm (Sm1).  The resection margin is negative. Lymphovascular invasion, perineural invasion, or tumor budding are not identified. Because of the low risk profile, observation with repeat colonoscopy, CT, and CEA in 1 year would be a reasonable approach. However, with T1 adenocarcinomas there remains a risk of ~10% LN mets, for which a colectomy would be indicated. This was clearly explained to the pt. All questions were answered. He is anxious because of his family history and would like more time to make his decision. At the same time, his staging CT revealed a subcentimeter hepatic hypodensity, an enlarged portacaval node, and an indeterminate adrenal nodule for which additional MRIs are indicated prior to finalize any plan. Risks, benefits, and alternatives of operative treatment were thoroughly discussed, he understands these well and agrees to proceed with additional testing.    All pertinent labs and imaging were personally reviewed by me.      PLAN  - MRI liver  - MRI adrenal glad vs CT with adrenal protocol  - Follow up in 2-4 weeks to discuss further steps based on the above testing  - healthy life style and weight loss were encouraged    60 minutes spent on the date of the encounter doing chart review, history and exam, imaging review, documentation and further activities as noted above.        Referring Provider:  No referring provider defined for this encounter.     Primary Care Provider:  Mary Cline        Again, thank you for allowing me to participate in the care of your patient.      Sincerely,    Fredy Mancini,  MD

## 2023-03-28 NOTE — NURSING NOTE
Chief Complaint   Patient presents with     Cancer       Vitals:    03/28/23 1233   Pulse: 89   SpO2: 94%       There is no height or weight on file to calculate BMI.    Jarek Null EMT-P

## 2023-03-28 NOTE — PATIENT INSTRUCTIONS
Please go out to the waiting room to schedule for follow up appt and scans     [facility] [phone]

## 2023-03-31 ENCOUNTER — ANCILLARY PROCEDURE (OUTPATIENT)
Dept: CT IMAGING | Facility: CLINIC | Age: 53
End: 2023-03-31
Attending: SURGERY
Payer: COMMERCIAL

## 2023-03-31 ENCOUNTER — ANCILLARY ORDERS (OUTPATIENT)
Dept: SURGERY | Facility: CLINIC | Age: 53
End: 2023-03-31

## 2023-03-31 DIAGNOSIS — C18.2 MALIGNANT NEOPLASM OF ASCENDING COLON (H): ICD-10-CM

## 2023-03-31 PROCEDURE — 74150 CT ABDOMEN W/O CONTRAST: CPT | Mod: GC | Performed by: RADIOLOGY

## 2023-04-14 ENCOUNTER — ANCILLARY PROCEDURE (OUTPATIENT)
Dept: MRI IMAGING | Facility: CLINIC | Age: 53
End: 2023-04-14
Attending: SURGERY
Payer: COMMERCIAL

## 2023-04-14 DIAGNOSIS — C18.2 MALIGNANT NEOPLASM OF ASCENDING COLON (H): ICD-10-CM

## 2023-04-14 PROCEDURE — 74183 MRI ABD W/O CNTR FLWD CNTR: CPT | Performed by: RADIOLOGY

## 2023-04-14 PROCEDURE — A9581 GADOXETATE DISODIUM INJ: HCPCS | Performed by: RADIOLOGY

## 2023-04-14 NOTE — DISCHARGE INSTRUCTIONS
MRI Contrast Discharge Instructions    The IV contrast you received today will pass out of your body in your  urine. This will happen in the next 24 hours. You will not feel this process.  Your urine will not change color.    Drink at least 4 extra glasses of water or juice today (unless your doctor  has restricted your fluids). This reduces the stress on your kidneys.  You may take your regular medicines.    If you are on dialysis: It is best to have dialysis today.    If you have a reaction: Most reactions happen right away. If you have  any new symptoms after leaving the hospital (such as hives or swelling),  call your hospital at the correct number below. Or call your family doctor.  If you have breathing distress or wheezing, call 911.    Special instructions: ***    I have read and understand the above information.    Signature:______________________________________ Date:___________    Staff:__________________________________________ Date:___________     Time:__________    [place] [facility] Departments:    ___[place]: [phone]  ___[facility]: [phone]  ___[place]: [phone] ___[facility]: [phone]  ___[facility]: [phone]  ___[facility]: [phone]  ___[phone]  ___[facility]: [phone]  ___[phone]

## 2023-04-18 NOTE — PROGRESS NOTES
"Colon and Rectal Surgery Clinic Note    RE: Ryan Briseno.  : 1970.  JOYCE: 2023.    Reason for visit:  Follow up     HPI: Ryan Briseno is a 52 year old male who presents today for a follow up. He underwent a colonoscopy on 2022 for a positive cologaurd test and was found to have a 35mm polyp in the ascending colon, 10cm from the IC valve. This was not resected, biopsies showed a tubulovillous adenoma. He was referred to Dr. Celia Jaffe for endoscopic resection which was completed on 3/17/2023. Pathology demonstrated a focal adenocarcinoma arising in a tubular adenoma. The focus of adenocarcinoma was less than 1mm and invaded into the submucosa, approximately 0.5mm (SM1). MMR staining was performed but the adenocarcinoma was not identified in the slides. Resection margins were negative. There was no LVI, PNI, or tumor budding. CT Chest/Abdomen/Pelvis showed a subcentimeter hepatic hypodensity, an enlarged portacaval node, and an indeterminate adrenal nodule. CEA 2.2. MRI abdomen showed a 6-7mm simple cyst anteriorly in the liver. Benign adenoma lateral limb right adrenal gland.     Interval History: He feels well. No GI sx. Trying to work on healthier habits.      MRI Abdomen (2023):  IMPRESSION: Prior ascending colon surgical material. 6-7 mm simple  cyst anteriorly in the liver. Benign adenoma lateral limb right  adrenal gland. These findings correlate with recent CT scan findings.  Also correlating with recent CT are nonenlarged portacaval lymph  nodes. Continued surveillance recommended.    Medications:  Current Outpatient Medications   Medication Sig Dispense Refill     bisacodyl (DULCOLAX) 5 MG EC tablet Refer to \"Getting Ready for a Colonoscopy\" instruction handout 4 tablet 0     bisacodyl (DULCOLAX) 5 MG EC tablet Take 2 tablets at 3 pm the day before your procedure. If your procedure is before 11 am, take 2 additional tablets at 11 pm. If your procedure is after 11 am, take 2 additional " tablets at 6 am. For additional instructions refer to your colonoscopy prep instructions. 4 tablet 0     polyethylene glycol (GOLYTELY) 236 g suspension The night before the exam at 6 pm drink an 8-ounce glass every 15 minutes until the jug is half empty. If you arrive before 11 AM: Drink the other half of the Golytely jug at 11 PM night before procedure. If you arrive after 11 AM: Drink the other half of the Golytely jug at 6 AM day of procedure. For additional instructions refer to your colonoscopy prep instructions. 4000 mL 0       Allergies:  Allergies   Allergen Reactions     Penicillins Hives       Social history:   Social History     Tobacco Use     Smoking status: Every Day     Packs/day: 0.50     Years: 30.00     Pack years: 15.00     Types: Cigarettes     Smokeless tobacco: Never     Tobacco comments:     Have smoked foe 30yrs and tried to quit 3 times.   Vaping Use     Vaping status: Never Used   Substance Use Topics     Alcohol use: Not Currently     Comment: not for 5 years      Marital status: .    ROS:  A complete review of systems was performed with the patient and all systems negative except as per HPI.    Physical Examination:  Exam was chaperoned by Jarek Null, EMT-P    BP (!) 163/80 (BP Location: Left arm, Patient Position: Sitting, Cuff Size: Adult Large)   Pulse 89   SpO2 97%   General: Well hydrated. No acute distress.  Abdomen: Soft, NT. No inguinal adenopathy palpated.  JERSON: Deferred    ASSESSMENT    This is a 52 year old M with a small focus of adenocarcinoma (1 mm) in the setting of a semipedunculated TA with HGD, invading into the submucosa approximately 0.5 mm (Sm1).  The resection margin is negative. Lymphovascular invasion, perineural invasion, or tumor budding are not identified. Because of the low risk profile, observation with repeat colonoscopy, CT, and CEA in 1 year would be a reasonable approach. However, with T1 adenocarcinomas there remains a risk of ~10% LN mets,  for which a colectomy would be indicated. This was clearly explained to the pt again. All questions were answered. Further work up revealed benign findings of liver cyst and adrenal adenoma. There is an 11 cm enlarged portocaval node of undetermined significance. Risks, benefits, and alternatives of operative treatment were thoroughly discussed, he understands these well and agrees with the plan.    All pertinent labs and imaging were personally reviewed by me.    PLAN  - Referral to medical oncology for further work-up/surveillence of portocaval enlarged LN and stage I colon cancer    40 minutes spent on the date of the encounter doing chart review, history and exam, imaging review, documentation and further activities as noted above.      Fredy Mancini MD    Division of Colon and Rectal Surgery  [facility]    Referring Provider:  No referring provider defined for this encounter.     Primary Care Provider:  Mary Cline

## 2023-04-22 ENCOUNTER — HEALTH MAINTENANCE LETTER (OUTPATIENT)
Age: 53
End: 2023-04-22

## 2023-04-25 ENCOUNTER — OFFICE VISIT (OUTPATIENT)
Dept: SURGERY | Facility: CLINIC | Age: 53
End: 2023-04-25
Payer: COMMERCIAL

## 2023-04-25 ENCOUNTER — PATIENT OUTREACH (OUTPATIENT)
Dept: ONCOLOGY | Facility: CLINIC | Age: 53
End: 2023-04-25

## 2023-04-25 VITALS — HEART RATE: 89 BPM | DIASTOLIC BLOOD PRESSURE: 80 MMHG | SYSTOLIC BLOOD PRESSURE: 163 MMHG | OXYGEN SATURATION: 97 %

## 2023-04-25 DIAGNOSIS — C18.2 MALIGNANT NEOPLASM OF ASCENDING COLON (H): Primary | ICD-10-CM

## 2023-04-25 DIAGNOSIS — R59.1 LYMPHADENOPATHY: ICD-10-CM

## 2023-04-25 PROCEDURE — 99215 OFFICE O/P EST HI 40 MIN: CPT | Performed by: SURGERY

## 2023-04-25 ASSESSMENT — PAIN SCALES - GENERAL: PAINLEVEL: NO PAIN (0)

## 2023-04-25 NOTE — NURSING NOTE
Chief Complaint   Patient presents with     Follow Up       Vitals:    04/25/23 1205   BP: (!) 163/80   BP Location: Left arm   Patient Position: Sitting   Cuff Size: Adult Large   Pulse: 89   SpO2: 97%       There is no height or weight on file to calculate BMI.    Jarek Null EMT-P

## 2023-04-25 NOTE — LETTER
2023       RE: Ryan Briseno  [address]  [place] [facility] [place] [address]     Dear Colleague,    Thank you for referring your patient, Ryan Briseno, to the [facility] COLON AND RECTAL SURGERY CLINIC [place] [facility]. Please see a copy of my visit note below.    Colon and Rectal Surgery Clinic Note    RE: Ryan Briseno.  : 1970.  JOYCE: 2023.    Reason for visit:  Follow up     HPI: Ryan Briseno is a 52 year old male who presents today for a follow up. He underwent a colonoscopy on 2022 for a positive cologaurd test and was found to have a 35mm polyp in the ascending colon, 10cm from the IC valve. This was not resected, biopsies showed a tubulovillous adenoma. He was referred to Dr. Celia Jaffe for endoscopic resection which was completed on 3/17/2023. Pathology demonstrated a focal adenocarcinoma arising in a tubular adenoma. The focus of adenocarcinoma was less than 1mm and invaded into the submucosa, approximately 0.5mm (SM1). MMR staining was performed but the adenocarcinoma was not identified in the slides. Resection margins were negative. There was no LVI, PNI, or tumor budding. CT Chest/Abdomen/Pelvis showed a subcentimeter hepatic hypodensity, an enlarged portacaval node, and an indeterminate adrenal nodule. CEA 2.2. MRI abdomen showed a 6-7mm simple cyst anteriorly in the liver. Benign adenoma lateral limb right adrenal gland.     Interval History: He feels well. No GI sx. Trying to work on healthier habits.    MRI Abdomen (2023):  IMPRESSION: Prior ascending colon surgical material. 6-7 mm simple  cyst anteriorly in the liver. Benign adenoma lateral limb right  adrenal gland. These findings correlate with recent CT scan findings.  Also correlating with recent CT are nonenlarged portacaval lymph  nodes. Continued surveillance recommended.    Medications:  Current Outpatient Medications   Medication Sig Dispense Refill     "bisacodyl (DULCOLAX) 5 MG EC tablet Refer to \"Getting Ready for a Colonoscopy\" instruction handout 4 tablet 0    bisacodyl (DULCOLAX) 5 MG EC tablet Take 2 tablets at 3 pm the day before your procedure. If your procedure is before 11 am, take 2 additional tablets at 11 pm. If your procedure is after 11 am, take 2 additional tablets at 6 am. For additional instructions refer to your colonoscopy prep instructions. 4 tablet 0    polyethylene glycol (GOLYTELY) 236 g suspension The night before the exam at 6 pm drink an 8-ounce glass every 15 minutes until the jug is half empty. If you arrive before 11 AM: Drink the other half of the Golytely jug at 11 PM night before procedure. If you arrive after 11 AM: Drink the other half of the Golytely jug at 6 AM day of procedure. For additional instructions refer to your colonoscopy prep instructions. 4000 mL 0       Allergies:  Allergies   Allergen Reactions    Penicillins Hives       Social history:   Social History     Tobacco Use    Smoking status: Every Day     Packs/day: 0.50     Years: 30.00     Pack years: 15.00     Types: Cigarettes    Smokeless tobacco: Never    Tobacco comments:     Have smoked foe 30yrs and tried to quit 3 times.   Vaping Use    Vaping status: Never Used   Substance Use Topics    Alcohol use: Not Currently     Comment: not for 5 years      Marital status: .    ROS:  A complete review of systems was performed with the patient and all systems negative except as per HPI.    Physical Examination:  Exam was chaperoned by Jarek Null, EMT-P    BP (!) 163/80 (BP Location: Left arm, Patient Position: Sitting, Cuff Size: Adult Large)   Pulse 89   SpO2 97%   General: Well hydrated. No acute distress.  Abdomen: Soft, NT. No inguinal adenopathy palpated.  JERSON: Deferred    ASSESSMENT    This is a 52 year old M with a small focus of adenocarcinoma (1 mm) in the setting of a semipedunculated TA with HGD, invading into the submucosa approximately 0.5 mm " (Sm1).  The resection margin is negative. Lymphovascular invasion, perineural invasion, or tumor budding are not identified. Because of the low risk profile, observation with repeat colonoscopy, CT, and CEA in 1 year would be a reasonable approach. However, with T1 adenocarcinomas there remains a risk of ~10% LN mets, for which a colectomy would be indicated. This was clearly explained to the pt again. All questions were answered. Further work up revealed benign findings of liver cyst and adrenal adenoma. There is an 11 cm enlarged portocaval node of undetermined significance. Risks, benefits, and alternatives of operative treatment were thoroughly discussed, he understands these well and agrees with the plan.    All pertinent labs and imaging were personally reviewed by me.    PLAN  - Referral to medical oncology for further work-up/surveillence of portocaval enlarged LN and stage I colon cancer    40 minutes spent on the date of the encounter doing chart review, history and exam, imaging review, documentation and further activities as noted above.      Referring Provider:  No referring provider defined for this encounter.     Primary Care Provider:  Mary Cline        Again, thank you for allowing me to participate in the care of your patient.      Sincerely,    Fredy Mancini MD

## 2023-04-25 NOTE — PROGRESS NOTES
New Patient Oncology Nurse Navigator Note     Referring provider: Dr Stevens, Colorectal Surgery    Referring Clinic/Organization: [facility]     Referred to: Medical Oncology    Requested provider (if applicable): First available - did not specify     Referral Received: 04/25/23       Evaluation for : colon cancer     Clinical History (per Nurse review of records provided):      3/17/23 colonoscopy at   Final Diagnosis   A. COLON, ASCENDING, POLYP:  - Focal adenocarcinoma arising in a tubular adenoma with high-grade dysplasia; see comment     B. COLON, SIGMOID, PRIOR POLYPECTOMY SITE, BIOPSY:  - Hyperplastic polyp  - Fragments of colonic mucosa with mild architectural distortion and reactive changes      Electronically signed by Jono Chan DO on 3/22/2023 at  1:08 PM       3/23/23 CT CAP  IMPRESSION:   1. Subcentimeter hepatic hypodensity is too small to characterize.  Follow up MR of the liver is recommended to further evaluate.  2. Enlarged portacaval lymph node, reactive versus metastatic.  Attention on follow up.   3. Indeterminant 1.0 cm right adrenal nodule. This can be further  characterized on follow up MR.   4. No evidence of metastatic disease in the chest.   5. Multinodular thyroid. Consider follow up thyroid ultrasound for  further evaluation.       3/31/23 CT AP  IMPRESSION: 1.0 cm benign right adrenal adenoma.    4/14/23 MRI Abd  IMPRESSION: Prior ascending colon surgical material. 6-7 mm simple  cyst anteriorly in the liver. Benign adenoma lateral limb right  adrenal gland. These findings correlate with recent CT scan findings.  Also correlating with recent CT are nonenlarged portacaval lymph  nodes. Continued surveillance recommended.      Clinical Assessment / Barriers to Care (Per Nurse):    Pt established w/ Dr Germán PEREZ sending to Med Onc for surveillance vs further work up of portocaval LAD in pt with stage I colon cancer.    Will offer next available Onc (Dr Leslie 5/2 Tue at  [facility]) or schedule per pt preference.          Records Location: [facility]     Records Needed:     None    Additional testing needed prior to consult:     None      González Uribe, RN, BSN, OCN  Oncology New Patient Nurse Navigator   [facility]  [phone]

## 2023-05-09 NOTE — TELEPHONE ENCOUNTER
RECORDS STATUS - ALL OTHER DIAGNOSIS      RECORDS RECEIVED FROM: Epic   DATE RECEIVED:    NOTES STATUS DETAILS   OFFICE NOTE from referring provider Epic 04/25/23: Dr. Fredy Mancini   DISCHARGE SUMMARY from hospital [facility] 03/17/23: [facility]   DISCHARGE REPORT from the ER     OPERATIVE REPORT [facility] 03/17/23: COLONOSCOPY, WITH ENDOSCOPIC MUCOSAL RESECTION and biopsies    12/30/22: COLONOSCOPY, WITH CO2 INSUFFLATION   MEDICATION LIST [facility]    LABS     PATHOLOGY REPORTS Reports in EPIC 03/17/23: [id number]  12/30/22: [id number]   ANYTHING RELATED TO DIAGNOSIS Epic Most recent 03/28/23   IMAGING (NEED IMAGES & REPORT)     CT SCANS PACS 03/31/23: CT Abd   03/25/23: CT Chest Abd Pel   MRI PACS 04/14/23: MR Abd

## 2023-05-10 ENCOUNTER — PRE VISIT (OUTPATIENT)
Dept: ONCOLOGY | Facility: CLINIC | Age: 53
End: 2023-05-10

## 2023-05-10 ENCOUNTER — ONCOLOGY VISIT (OUTPATIENT)
Dept: ONCOLOGY | Facility: CLINIC | Age: 53
End: 2023-05-10
Attending: SURGERY
Payer: COMMERCIAL

## 2023-05-10 VITALS
OXYGEN SATURATION: 97 % | HEIGHT: 71 IN | WEIGHT: 242.6 LBS | HEART RATE: 78 BPM | DIASTOLIC BLOOD PRESSURE: 90 MMHG | SYSTOLIC BLOOD PRESSURE: 148 MMHG | BODY MASS INDEX: 33.96 KG/M2

## 2023-05-10 DIAGNOSIS — R59.1 LYMPHADENOPATHY: ICD-10-CM

## 2023-05-10 DIAGNOSIS — C18.2 MALIGNANT NEOPLASM OF ASCENDING COLON (H): ICD-10-CM

## 2023-05-10 LAB
BASOPHILS # BLD AUTO: 0.1 10E3/UL (ref 0–0.2)
BASOPHILS NFR BLD AUTO: 1 %
CEA SERPL-MCNC: 2.8 NG/ML
EOSINOPHIL # BLD AUTO: 0.3 10E3/UL (ref 0–0.7)
EOSINOPHIL NFR BLD AUTO: 3 %
ERYTHROCYTE [DISTWIDTH] IN BLOOD BY AUTOMATED COUNT: 12.1 % (ref 10–15)
HCT VFR BLD AUTO: 48.2 % (ref 40–53)
HGB BLD-MCNC: 16.7 G/DL (ref 13.3–17.7)
HOLD SPECIMEN: NORMAL
IMM GRANULOCYTES # BLD: 0 10E3/UL
IMM GRANULOCYTES NFR BLD: 0 %
LYMPHOCYTES # BLD AUTO: 2.9 10E3/UL (ref 0.8–5.3)
LYMPHOCYTES NFR BLD AUTO: 27 %
MCH RBC QN AUTO: 31.9 PG (ref 26.5–33)
MCHC RBC AUTO-ENTMCNC: 34.6 G/DL (ref 31.5–36.5)
MCV RBC AUTO: 92 FL (ref 78–100)
MONOCYTES # BLD AUTO: 0.8 10E3/UL (ref 0–1.3)
MONOCYTES NFR BLD AUTO: 7 %
NEUTROPHILS # BLD AUTO: 6.6 10E3/UL (ref 1.6–8.3)
NEUTROPHILS NFR BLD AUTO: 62 %
NRBC # BLD AUTO: 0 10E3/UL
NRBC BLD AUTO-RTO: 0 /100
PLATELET # BLD AUTO: 209 10E3/UL (ref 150–450)
RBC # BLD AUTO: 5.23 10E6/UL (ref 4.4–5.9)
WBC # BLD AUTO: 10.7 10E3/UL (ref 4–11)

## 2023-05-10 PROCEDURE — 85025 COMPLETE CBC W/AUTO DIFF WBC: CPT | Performed by: INTERNAL MEDICINE

## 2023-05-10 PROCEDURE — 36415 COLL VENOUS BLD VENIPUNCTURE: CPT | Performed by: INTERNAL MEDICINE

## 2023-05-10 PROCEDURE — 99204 OFFICE O/P NEW MOD 45 MIN: CPT | Performed by: INTERNAL MEDICINE

## 2023-05-10 PROCEDURE — 82378 CARCINOEMBRYONIC ANTIGEN: CPT | Performed by: INTERNAL MEDICINE

## 2023-05-10 ASSESSMENT — PAIN SCALES - GENERAL: PAINLEVEL: NO PAIN (0)

## 2023-05-10 NOTE — PROGRESS NOTES
Oncology initial visit:  Date on this visit: 5/10/2023    Ryan Briseno  is referred by Dr.Paolo Mancini for an oncology consultation. He requires evaluation for new diagnosis of colon adenocarcinoma    Primary Physician: Mary Cline     History Of Present Illness:  Mr. Briseno is a 52 year old male who presents with new diagnosis of colon adenocarcinoma.    I have reviewed notes from colorectal surgeon Dr. Mancini    He had a positive Cologuard test so underwent colonoscopy on 12/30/2022.  It showed 35 mm sessile polyp in the ascending colon, about 10 cm from the ileocecal valve.  This was not resected and biopsies showed tubulovillous adenoma.   Two sessile polyps were found in the transverse colon. The polyps were 7 to 9 mm in size. These polyps were removed with a cold snare.  Pathology showed tubular adenoma.    Two sessile polyps were found in the sigmoid colon. The polyps were 5 to 7 mm in size. These polyps were removed with a cold snare.  Pathology showed hyperplastic polyps    On 3/17/2023 he had endoscopic resection by Dr. Jaffe of the ascending colon polyp.  Pathology demonstrated focal adenocarcinoma arising in a tubular adenoma with high-grade dysplasia.  The focus of adenocarcinoma was <1mm and invaded into the submucosa approximately 0.5mm (SM1).  Margins were negative.  No evidence of lymphovascular invasion perineural invasion or tumor budding.  MMR is staining was attempted but adenocarcinoma was not identified.    3/25/2023.  CT chest abdomen and pelvis showed a tiny hypodensity in the subcapsular left hepatic lobe.  12 mm enlarged portacaval lymph node with several surrounding prominent lymph nodes.  1 cm right adrenal nodule was also seen.  No evidence of metastatic disease in the chest.    CT abdomen on 3/31/2023 showed 1 cm benign right adrenal adenoma.  11 mm prominent tamia hepatic lymph node was seen.    4/14/2023.  MRI abdomen with Eovist showed the right adrenal gland nodule  consistent with benign adenoma.  Subcentimeter short axis portacaval lymph node.  Benign simple cyst of the liver was seen.  No concerning liver lesions were seen.    He met with Dr. Mancini who discussed with him option of proceeding with a right hemicolectomy versus observation alone.  He tells me he is leaning towards observation alone but he wants to discuss with me as well.  He feels well.  He denies any abdominal pain or GI problems.  No bleeding.  Energy is good.  No infections.      ECOG 0    ROS:  A comprehensive ROS was otherwise neg      Past Medical/Surgical History:  Past Medical History:   Diagnosis Date     Hypertension      Past Surgical History:   Procedure Laterality Date     COLONOSCOPY N/A 12/30/2022    Procedure: COLONOSCOPY, WITH POLYPECTOMY AND BIOPSY;  Surgeon: Tyler Pandey MD;  Location: [facility] OR     COLONOSCOPY N/A 12/30/2022    Procedure: COLONOSCOPY, FLEXIBLE, WITH LESION REMOVAL USING SNARE;  Surgeon: yTler Pandey MD;  Location: [facility] OR     COLONOSCOPY WITH CO2 INSUFFLATION N/A 12/30/2022    Procedure: COLONOSCOPY, WITH CO2 INSUFFLATION;  Surgeon: Tyler Pandey MD;  Location: [facility] OR     COLONOSCOPY, SUBMUCOSA RESECTION N/A 3/17/2023    Procedure: SUBMUCOSAL RESECTION;  Surgeon: Jos Jaffe MD;  Location: [facility] OR     Cancer History:   As above    Allergies:  Allergies as of 05/10/2023 - Reviewed 05/10/2023   Allergen Reaction Noted     Penicillins Hives 02/01/2021     Current Medications:  No current outpatient medications on file.      Family History:  Family History   Problem Relation Age of Onset     Hypertension Mother      Colon Cancer Mother      Cerebrovascular Disease Maternal Grandfather      Colon Cancer Paternal Grandfather    mother had colon cancer in her 60s.  Paternal grand father had rectal cancer  He has 3 kids- all healthy      Social History:  Social History     Socioeconomic History     Marital status:      Spouse name:  "Not on file     Number of children: Not on file     Years of education: Not on file     Highest education level: Not on file   Occupational History     Not on file   Tobacco Use     Smoking status: Every Day     Packs/day: 0.50     Years: 30.00     Pack years: 15.00     Types: Cigarettes     Smokeless tobacco: Never     Tobacco comments:     Have smoked foe 30yrs and tried to quit 3 times.   Vaping Use     Vaping status: Never Used   Substance and Sexual Activity     Alcohol use: Not Currently     Comment: not for 5 years      Drug use: Not Currently     Comment: usage was back in my 20's and 30's.     Sexual activity: Not Currently     Partners: Female     Birth control/protection: None   Other Topics Concern     Parent/sibling w/ CABG, MI or angioplasty before 65F 55M? No   Social History Narrative     Not on file     Social Determinants of Health     Financial Resource Strain: Not on file   Food Insecurity: Not on file   Transportation Needs: Not on file   Physical Activity: Not on file   Stress: Not on file   Social Connections: Not on file   Intimate Partner Violence: Not on file   Housing Stability: Not on file       Smokes about 1/2 ppd and used to take 1.5 to 2 ppd  Last etoh was in 2017  Lives with wife and 2 kids.  Works at [employer]                                                                                                                                                                                          Physical Exam:  BP (!) 148/90 (BP Location: Right arm, Patient Position: Chair, Cuff Size: Adult Large)   Pulse 78   Ht 1.803 m (5' 11\")   Wt 110 kg (242 lb 9.6 oz)   SpO2 97%   BMI 33.84 kg/m      Wt Readings from Last 4 Encounters:   05/10/23 110 kg (242 lb 9.6 oz)   03/17/23 112.9 kg (248 lb 14.4 oz)   03/01/23 112.9 kg (249 lb)   05/04/22 114.2 kg (251 lb 11.2 oz)     CONSTITUTIONAL: no acute distress  EYES: PERRLA, no palor or icterus.   ENT/MOUTH: no mouth lesions. Ears normal  CVS: " s1s2 no m r g .   RESPIRATORY: clear to auscultation b/l  GI: soft non tender no hepatosplenomegaly  NEURO: AAOX3  Grossly non focal neuro exam  INTEGUMENT: no obvious rashes  LYMPHATIC: no palpable cervical, supraclavicular, axillary or inguinal LAD  MUSCULOSKELETAL: Unremarkable. No bony tenderness.   EXTREMITIES: no edema  PSYCH: Mentation, mood and affect are normal. Decision making capacity is intact    Laboratory/Imaging Studies      Reviewed    CEA was 2.2 on 3/28/2023  BMP was unremarkable on 3/15/2022.    Imaging/colonoscopy/pathology reviewed and mentioned above.      ASSESSMENT/PLAN:      Stage I colon adenocarcinoma which was found in the background of sessile tubulovillous adenoma with high-grade dysplasia which was removed endoscopically on 3/17/2023.  No lymphovascular or perineural invasion or tumor budding.  All margins were negative.  Unable to determine MMR status.    There is slightly prominent tamia hepatis lymph nodes which is indeterminate.  No other evidence of metastatic disease.    He has benign hepatic cyst and right adrenal adenoma.    We discussed the situation in detail.  His chances of cure from current resection is high although there is a smaller risk that cancer might have spread to the lymph node and can recur.  This is a risk in general would be less than 10%.    Because it is a sessile polyp, we have the option of observation versus right hemicolectomy.     We discussed pros and cons of each approach.    He wants to think about it before giving us final answer.  We will get baseline blood work today.    With regards to surveillance, I recommend colonoscopy in 1 year which would be in March 2024.  In order to follow-up on indeterminant prominent tamia hepatis lymph node, I would recommend checking CT abdomen/pelvis in 6 months.    Discussion regarding smoking.  Strongly encouraged him to quit smoking.  Congratulated him on his efforts in cutting down smoking.  I offered him  referral to smoking cessation program but at this time he is not interested.    Elevated blood pressure.  His blood pressure in the clinic was elevated.  He is asymptomatic.  I recommend that he checks his blood pressures at home regularly when he is relaxed and keep a log of it.  If it is persistently high then he should talk to his primary care physician for better blood pressure management.  I am not starting any antihypertensive today.    Discussion regarding genetic testing.  Because of personal and family history of cancers, I recommend genetic counselor evaluation.  I gave him referral for that.    He will let us know once he has discussed this with his wife so that we can make follow-up accordingly.    All of his questions were answered to his satisfaction.  He is agreeable and comfortable with the plan.    Mark Leslie MD

## 2023-05-10 NOTE — LETTER
5/10/2023         RE: Ryan Briseno  [id number]  [place] [address]        Dear Colleague,    Thank you for referring your patient, Ryan Briseno, to the [facility]. Please see a copy of my visit note below.    Oncology initial visit:  Date on this visit: 5/10/2023    Ryan Briseno  is referred by Dr.Paolo Mancini for an oncology consultation. He requires evaluation for new diagnosis of colon adenocarcinoma    Primary Physician: Mary Cline     History Of Present Illness:  Mr. Briseno is a 52 year old male who presents with new diagnosis of colon adenocarcinoma.    I have reviewed notes from colorectal surgeon Dr. Mancini    He had a positive Cologuard test so underwent colonoscopy on 12/30/2022.  It showed 35 mm sessile polyp in the ascending colon, about 10 cm from the ileocecal valve.  This was not resected and biopsies showed tubulovillous adenoma.   Two sessile polyps were found in the transverse colon. The polyps were 7 to 9 mm in size. These polyps were removed with a cold snare.  Pathology showed tubular adenoma.    Two sessile polyps were found in the sigmoid colon. The polyps were 5 to 7 mm in size. These polyps were removed with a cold snare.  Pathology showed hyperplastic polyps    On 3/17/2023 he had endoscopic resection by Dr. Jaffe of the ascending colon polyp.  Pathology demonstrated focal adenocarcinoma arising in a tubular adenoma with high-grade dysplasia.  The focus of adenocarcinoma was <1mm and invaded into the submucosa approximately 0.5mm (SM1).  Margins were negative.  No evidence of lymphovascular invasion perineural invasion or tumor budding.  MMR is staining was attempted but adenocarcinoma was not identified.    3/25/2023.  CT chest abdomen and pelvis showed a tiny hypodensity in the subcapsular left hepatic lobe.  12 mm enlarged portacaval lymph node with several surrounding prominent lymph nodes.  1 cm right adrenal nodule was  also seen.  No evidence of metastatic disease in the chest.    CT abdomen on 3/31/2023 showed 1 cm benign right adrenal adenoma.  11 mm prominent tamia hepatic lymph node was seen.    4/14/2023.  MRI abdomen with Eovist showed the right adrenal gland nodule consistent with benign adenoma.  Subcentimeter short axis portacaval lymph node.  Benign simple cyst of the liver was seen.  No concerning liver lesions were seen.    He met with Dr. Mancini who discussed with him option of proceeding with a right hemicolectomy versus observation alone.  He tells me he is leaning towards observation alone but he wants to discuss with me as well.  He feels well.  He denies any abdominal pain or GI problems.  No bleeding.  Energy is good.  No infections.      ECOG 0    ROS:  A comprehensive ROS was otherwise neg      Past Medical/Surgical History:  Past Medical History:   Diagnosis Date     Hypertension      Past Surgical History:   Procedure Laterality Date     COLONOSCOPY N/A 12/30/2022    Procedure: COLONOSCOPY, WITH POLYPECTOMY AND BIOPSY;  Surgeon: Tyler Pandey MD;  Location: [facility]     COLONOSCOPY N/A 12/30/2022    Procedure: COLONOSCOPY, FLEXIBLE, WITH LESION REMOVAL USING SNARE;  Surgeon: Tyler Pandey MD;  Location: [facility]     COLONOSCOPY WITH CO2 INSUFFLATION N/A 12/30/2022    Procedure: COLONOSCOPY, WITH CO2 INSUFFLATION;  Surgeon: Tyler Pandey MD;  Location: [facility]     COLONOSCOPY, SUBMUCOSA RESECTION N/A 3/17/2023    Procedure: SUBMUCOSAL RESECTION;  Surgeon: Jos Jaffe MD;  Location: [facility] OR     Cancer History:   As above    Allergies:  Allergies as of 05/10/2023 - Reviewed 05/10/2023   Allergen Reaction Noted     Penicillins Hives 02/01/2021     Current Medications:  No current outpatient medications on file.      Family History:  Family History   Problem Relation Age of Onset     Hypertension Mother      Colon Cancer Mother      Cerebrovascular Disease Maternal Grandfather  "     Colon Cancer Paternal Grandfather    mother had colon cancer in her 60s.  Paternal grand father had rectal cancer  He has 3 kids- all healthy      Social History:  Social History     Socioeconomic History     Marital status:      Spouse name: Not on file     Number of children: Not on file     Years of education: Not on file     Highest education level: Not on file   Occupational History     Not on file   Tobacco Use     Smoking status: Every Day     Packs/day: 0.50     Years: 30.00     Pack years: 15.00     Types: Cigarettes     Smokeless tobacco: Never     Tobacco comments:     Have smoked foe 30yrs and tried to quit 3 times.   Vaping Use     Vaping status: Never Used   Substance and Sexual Activity     Alcohol use: Not Currently     Comment: not for 5 years      Drug use: Not Currently     Comment: usage was back in my 20's and 30's.     Sexual activity: Not Currently     Partners: Female     Birth control/protection: None   Other Topics Concern     Parent/sibling w/ CABG, MI or angioplasty before 65F 55M? No   Social History Narrative     Not on file     Social Determinants of Health     Financial Resource Strain: Not on file   Food Insecurity: Not on file   Transportation Needs: Not on file   Physical Activity: Not on file   Stress: Not on file   Social Connections: Not on file   Intimate Partner Violence: Not on file   Housing Stability: Not on file       Smokes about 1/2 ppd and used to take 1.5 to 2 ppd  Last etoh was in 2017  Lives with wife and 2 kids.  Works at [employer]                                                                                                                                                                                          Physical Exam:  BP (!) 148/90 (BP Location: Right arm, Patient Position: Chair, Cuff Size: Adult Large)   Pulse 78   Ht 1.803 m (5' 11\")   Wt 110 kg (242 lb 9.6 oz)   SpO2 97%   BMI 33.84 kg/m      Wt Readings from Last 4 Encounters: "   05/10/23 110 kg (242 lb 9.6 oz)   03/17/23 112.9 kg (248 lb 14.4 oz)   03/01/23 112.9 kg (249 lb)   05/04/22 114.2 kg (251 lb 11.2 oz)     CONSTITUTIONAL: no acute distress  EYES: PERRLA, no palor or icterus.   ENT/MOUTH: no mouth lesions. Ears normal  CVS: s1s2 no m r g .   RESPIRATORY: clear to auscultation b/l  GI: soft non tender no hepatosplenomegaly  NEURO: AAOX3  Grossly non focal neuro exam  INTEGUMENT: no obvious rashes  LYMPHATIC: no palpable cervical, supraclavicular, axillary or inguinal LAD  MUSCULOSKELETAL: Unremarkable. No bony tenderness.   EXTREMITIES: no edema  PSYCH: Mentation, mood and affect are normal. Decision making capacity is intact    Laboratory/Imaging Studies      Reviewed    CEA was 2.2 on 3/28/2023  BMP was unremarkable on 3/15/2022.    Imaging/colonoscopy/pathology reviewed and mentioned above.      ASSESSMENT/PLAN:      Stage I colon adenocarcinoma which was found in the background of sessile tubulovillous adenoma with high-grade dysplasia which was removed endoscopically on 3/17/2023.  No lymphovascular or perineural invasion or tumor budding.  All margins were negative.  Unable to determine MMR status.    There is slightly prominent tamia hepatis lymph nodes which is indeterminate.  No other evidence of metastatic disease.    He has benign hepatic cyst and right adrenal adenoma.    We discussed the situation in detail.  His chances of cure from current resection is high although there is a smaller risk that cancer might have spread to the lymph node and can recur.  This is a risk in general would be less than 10%.    Because it is a sessile polyp, we have the option of observation versus right hemicolectomy.     We discussed pros and cons of each approach.    He wants to think about it before giving us final answer.  We will get baseline blood work today.    With regards to surveillance, I recommend colonoscopy in 1 year which would be in March 2024.  In order to follow-up on  indeterminant prominent tamia hepatis lymph node, I would recommend checking CT abdomen/pelvis in 6 months.    Discussion regarding smoking.  Strongly encouraged him to quit smoking.  Congratulated him on his efforts in cutting down smoking.  I offered him referral to smoking cessation program but at this time he is not interested.    Elevated blood pressure.  His blood pressure in the clinic was elevated.  He is asymptomatic.  I recommend that he checks his blood pressures at home regularly when he is relaxed and keep a log of it.  If it is persistently high then he should talk to his primary care physician for better blood pressure management.  I am not starting any antihypertensive today.    Discussion regarding genetic testing.  Because of personal and family history of cancers, I recommend genetic counselor evaluation.  I gave him referral for that.    He will let us know once he has discussed this with his wife so that we can make follow-up accordingly.    All of his questions were answered to his satisfaction.  He is agreeable and comfortable with the plan.    Mark Leslie MD         Again, thank you for allowing me to participate in the care of your patient.        Sincerely,        Mark Leslie MD

## 2023-05-10 NOTE — NURSING NOTE
"Oncology Rooming Note    May 10, 2023 11:35 AM   Ryan Briseno is a 52 year old male who presents for:    Chief Complaint   Patient presents with     Oncology Clinic Visit     New Colon Ca     Initial Vitals: BP (!) 148/90 (BP Location: Right arm, Patient Position: Chair, Cuff Size: Adult Large)   Pulse 78   Ht 1.803 m (5' 11\")   Wt 110 kg (242 lb 9.6 oz)   SpO2 97%   BMI 33.84 kg/m   Estimated body mass index is 33.84 kg/m  as calculated from the following:    Height as of this encounter: 1.803 m (5' 11\").    Weight as of this encounter: 110 kg (242 lb 9.6 oz). Body surface area is 2.35 meters squared.  No Pain (0) Comment: Data Unavailable   No LMP for male patient.  Allergies reviewed: Yes  Medications reviewed: Yes    Medications: Medication refills not needed today.  Pharmacy name entered into [employer]: [id number] - [place], [place] - [address], [place] AT [facility]    Clinical concerns: NO     Dr. Leslie was notified.      Geraldine Bundy CMA              "

## 2023-05-10 NOTE — PATIENT INSTRUCTIONS
Labs today    Refer to genetics    We will decide about follow up with me after you have made your decision

## 2023-05-11 ENCOUNTER — VIRTUAL VISIT (OUTPATIENT)
Dept: ONCOLOGY | Facility: CLINIC | Age: 53
End: 2023-05-11
Attending: INTERNAL MEDICINE
Payer: COMMERCIAL

## 2023-05-11 ENCOUNTER — TELEPHONE (OUTPATIENT)
Dept: ONCOLOGY | Facility: CLINIC | Age: 53
End: 2023-05-11
Payer: COMMERCIAL

## 2023-05-11 DIAGNOSIS — Z80.8 FAMILY HISTORY OF MELANOMA: ICD-10-CM

## 2023-05-11 DIAGNOSIS — Z80.0 FAMILY HISTORY OF COLON CANCER: Primary | ICD-10-CM

## 2023-05-11 DIAGNOSIS — C18.2 MALIGNANT NEOPLASM OF ASCENDING COLON (H): ICD-10-CM

## 2023-05-11 PROCEDURE — 96040 HC GENETIC COUNSELING, EACH 30 MINUTES: CPT | Mod: TEL,95 | Performed by: GENETIC COUNSELOR, MS

## 2023-05-11 NOTE — PROGRESS NOTES
5/11/2023    Virtual Visit Details  Type of service:  Telephone Visit   Phone call duration: 49 minutes     Referring Provider: Mark Leslie MD    Presenting Information:   Today Ryan elected for a virtual genetic counseling visit through the Cancer Risk Management Program to discuss his personal and family history of colorectal cancer. We reviewed this history, cancer screening recommendations, and available genetic testing options.    Personal History:  Ryan is a 52 year old male. He had a positive Cologuard test in March 2022, followed by a colonoscopy in December 2022 that identified one tubular adenoma, one sessile serrated adenoma, one hyperplastic polyp, and a 35mm tubulovillous adenoma in the ascending colon. He had a follow-up colonoscopy in March 2023 to remove the large TVA by mucosal resection. Ryan was subsequently diagnosed with focal adenocarcinoma arising in the adenoma at age 52 and is currently considering a right hemicolectomy versus observation alone. Of note, immunohistochemistry (IHC) of the mismatch repair proteins could not be performed due to the size of the adenocarcinoma sample. Of note, he had two basal cell carcinomas removed from his right upper lip and lower right eyelid in 2021.    He reports that he had a prior sigmoidoscopy in his 20's that identified one polyp, but further details are unknown. His most recent PSA in March 2022 was normal (0.96). Recent CT imaging has identified one likely benign right adrenal adenoma and a multinodular thyroid. He does not regularly do any other cancer screening at this time.    Family History: (Please see scanned pedigree for detailed family history information)    Ryan's mother is 74 and was diagnosed with colon cancer at age 68.    Ryan's father is 74 and had a melanoma removed from his arm in his 50's.    Ryan's paternal grandfather was diagnosed with rectal cancer in his 80's and passed away at age 89.    His maternal ethnicity is [place]. His  paternal ethnicity is [place] and potentially [place]. There is no known Ashkenazi Druze ancestry on either side of his family.    Discussion:    We reviewed the features of sporadic, familial, and hereditary cancers. In looking at Ryan's family history, it is possible that a cancer susceptibility gene is present as Ryan and a close relative on each side of his family have been diagnosed with colorectal cancer. That being said, neither Ryan or his relatives were diagnosed under age 50 and multiple relatives on each side of his family have not had a cancer. This likely reduces, but does not eliminate, the risk for a hereditary cancer syndrome in his family.    We discussed the natural history and genetics of hereditary colon cancer, including Stovall syndrome.     Stovall syndrome can be caused by a mutation in one of five genes: MLH1, MSH2, MSH6, PMS2, and EPCAM. The highest cancer risks associated with Stovall syndrome include colon, endometrial/uterine, gastric, and ovarian cancer. Other cancers have also been reported with Stovall syndrome, including prostate and urinary tract cancers.     We discussed that there are additional genes that could cause increased risk for colon and related cancers. As many of these genes present with overlapping features in a family and accurate cancer risk cannot always be established based upon the pedigree analysis alone, it would be reasonable for Ryan to consider panel genetic testing to analyze multiple genes at once.    Based on his personal and family history (PREMM5 score of 5.6%), Ryan meets current National Comprehensive Cancer Network (NCCN) criteria for genetic testing of high penetrance colon cancer genes (i.e. APC, MUTYH, MLH1, MSH2, MSH6, PMS2, EPCAM, BMPR1A, SMAD4, PTEN, STK11, etc.).    A detailed handout regarding these genes/syndromes and the information we discussed was provided to Ryan at the end of our appointment today and can be found in the after visit summary.  Topics included: inheritance pattern, cancer risks, cancer screening recommendations, and also risks, benefits and limitations of testing.    We reviewed genetic testing options for hereditary colon and related cancers: targeted colorectal cancer panel ([facility] Colorectal Cancer Panel) and expanded pan-cancer panels ([facility] Common Hereditary Cancers Panel or Multi-Cancer Panel). Ryan expressed interest in learning as much information as possible from the testing. He opted for the [facility] Multi-Cancer Panel.  The [facility] Multi-Cancer Panel analyzes 84 genes associated with increased risk for cancer: AIP, ALK, APC, ZENY, AXIN2, BAP1, BARD1, BLM, BMPR1A, BRCA1, BRCA2, BRIP1, CASR,CDC73, CDH1, CDK4, CDKN1B, CDKN1C, CDKN2A, CEBPA, CHEK2, CTNNA1, DICER1,DIS3L2, EGFR, EPCAM, FH, FLCN, GATA2, GPC3, GREM1, HOXB13, HRAS, KIT, MAX,MEN1, MET, MITF, MLH1, MSH2, MSH3, MSH6, MUTYH, NBN, NF1, NF2, NTHL1,PALB2, PDGFRA, PHOX2B, PMS2, POLD1, POLE, POT1, IPYDF0V, PTCH1, PTEN, RAD50,RAD51C, RAD51D, RB1, RECQL4, RET, RUNX1, SDHA, SDHAF2, SDHB, SDHC, SDHD,SMAD4, SMARCA4, SMARCB1, SMARCE1, STK11, SUFU, TERC, TERT, [id number], TP53,TSC1, TSC2, VHL, WRN, WT1.  This panel includes genes associated with hereditary cancers across multiple major organ systems, including: breast and gynecologic (breast, ovarian, uterine), gastrointestinal (colorectal, gastric, pancreatic), endocrine (thyroid, paraganglioma/pheochromocytoma, parathyroid, pituitary), genitourinary (renal/urinary tract, prostate), skin (melanoma, basal cell carcinoma), brain/nervous system, sarcoma, and hematologic (myelodysplastic syndrome/leukemia).  Individuals who are found to carry a mutation in one of these genes are at increased risk of developing certain cancers/tumors. Depending on the gene mutation found, identifying those at elevated risk may guide implementation of additional screening, surveillance, and other interventions.    Consent was obtained over the video and Ryan  elected to schedule a lab appointment at his earliest convenience. Ryan expressed significant concerns regarding potential out of pocket costs for the test and we discussed "[employer]'s insurance billing policy versus submitting a pre-authorization request prior to sample collection. After reviewing these options, Ryan stated that he would prefer to receive test results quickly and declined submitting a pre-authorization request prior to sample collection. Once his blood sample is collected, genetic testing using the Multi-Cancer Panel will be sent to [facility]. Of note, testing will begin with the Stovall syndrome genes with reflex to the complete panel. Turn around time: 2-3 weeks after [facility] receives his blood sample.    Medical Management: For Ryan, we reviewed that the information from genetic testing may determine:    surgery to treat Ryan's active cancer diagnosis (i.e. observation versus colectomy, etc.),    additional cancer screening for which Ryan may qualify (i.e. more frequent colonoscopies, regular upper endoscopies, more frequent dermatologic exams, etc.),    and targeted chemotherapies if he were to develop certain cancers in the future (i.e. immunotherapy for individuals with Stoavll syndrome, PARP inhibitors, etc.).     These recommendations and possible targeted chemotherapies will be discussed in detail once genetic testing is completed.     Plan:  1) Today Ryan elected to proceed with testing of the Stovall syndrome genes, with reflex to the [facility] Multi-Cancer Panel, through [facility]. He will schedule a lab appointment at his earliest convenience.  2) The results should be available 2-3 weeks after [facility] receives his blood sample.  3) Ryan will be contacted to schedule a virtual visit to discuss the results.    Sofiya Garcia MS, [facility] [place]  Licensed, Certified Genetic Counselor  Office: [phone]  Pager: [phone]

## 2023-05-11 NOTE — NURSING NOTE
Is the patient currently in the state of [place]? YES    Visit mode:TELEPHONE    If the visit is dropped, the patient can be reconnected by: TELEPHONE VISIT: Phone number: [phone]    Will anyone else be joining the visit? NO      How would you like to obtain your AVS? MyChart    Are changes needed to the allergy or medication list? NO    Reason for visit: Telephone    ASTER RIVERA

## 2023-05-11 NOTE — LETTER
5/11/2023         RE: Ryan Briseno  [address]  [facility] [address]        Dear Colleague,    Thank you for referring your patient, Ryan Briseno, to the [facility]. Please see a copy of my visit note below.    5/11/2023    Virtual Visit Details  Type of service:  Telephone Visit   Phone call duration: 49 minutes     Referring Provider: Mark Leslie MD    Presenting Information:   Today Ryan elected for a virtual genetic counseling visit through the Cancer Risk Management Program to discuss his personal and family history of colorectal cancer. We reviewed this history, cancer screening recommendations, and available genetic testing options.    Personal History:  Ryan is a 52 year old male. He had a positive Cologuard test in March 2022, followed by a colonoscopy in December 2022 that identified one tubular adenoma, one sessile serrated adenoma, one hyperplastic polyp, and a 35mm tubulovillous adenoma in the ascending colon. He had a follow-up colonoscopy in March 2023 to remove the large TVA by mucosal resection. Ryan was subsequently diagnosed with focal adenocarcinoma arising in the adenoma at age 52 and is currently considering a right hemicolectomy versus observation alone. Of note, immunohistochemistry (IHC) of the mismatch repair proteins could not be performed due to the size of the adenocarcinoma sample. Of note, he had two basal cell carcinomas removed from his right upper lip and lower right eyelid in 2021.    He reports that he had a prior sigmoidoscopy in his 20's that identified one polyp, but further details are unknown. His most recent PSA in March 2022 was normal (0.96). Recent CT imaging has identified one likely benign right adrenal adenoma and a multinodular thyroid. He does not regularly do any other cancer screening at this time.    Family History: (Please see scanned pedigree for detailed family history information)  Ryan's mother is 74 and was diagnosed  with colon cancer at age 68.  Ryan's father is 74 and had a melanoma removed from his arm in his 50's.  Ryan's paternal grandfather was diagnosed with rectal cancer in his 80's and passed away at age 89.  His maternal ethnicity is [place]. His paternal ethnicity is Bulgarian and potentially Dominican. There is no known Ashkenazi Orthodox ancestry on either side of his family.    Discussion:  We reviewed the features of sporadic, familial, and hereditary cancers. In looking at Ryan's family history, it is possible that a cancer susceptibility gene is present as Ryan and a close relative on each side of his family have been diagnosed with colorectal cancer. That being said, neither Ryan or his relatives were diagnosed under age 50 and multiple relatives on each side of his family have not had a cancer. This likely reduces, but does not eliminate, the risk for a hereditary cancer syndrome in his family.  We discussed the natural history and genetics of hereditary colon cancer, including Stovall syndrome.   Stovall syndrome can be caused by a mutation in one of five genes: MLH1, MSH2, MSH6, PMS2, and EPCAM. The highest cancer risks associated with Stovall syndrome include colon, endometrial/uterine, gastric, and ovarian cancer. Other cancers have also been reported with Stovall syndrome, including prostate and urinary tract cancers.   We discussed that there are additional genes that could cause increased risk for colon and related cancers. As many of these genes present with overlapping features in a family and accurate cancer risk cannot always be established based upon the pedigree analysis alone, it would be reasonable for Ryan to consider panel genetic testing to analyze multiple genes at once.  Based on his personal and family history (PREMM5 score of 5.6%), Ryan meets current National Comprehensive Cancer Network (NCCN) criteria for genetic testing of high penetrance colon cancer genes (i.e. APC, MUTYH, MLH1, MSH2, MSH6, PMS2,  EPCAM, BMPR1A, SMAD4, PTEN, STK11, etc.).  A detailed handout regarding these genes/syndromes and the information we discussed was provided to Ryan at the end of our appointment today and can be found in the after visit summary. Topics included: inheritance pattern, cancer risks, cancer screening recommendations, and also risks, benefits and limitations of testing.  We reviewed genetic testing options for hereditary colon and related cancers: targeted colorectal cancer panel ([facility]ae Colorectal Cancer Panel) and expanded pan-cancer panels ([facility] Common Hereditary Cancers Panel or Multi-Cancer Panel). Ryan expressed interest in learning as much information as possible from the testing. He opted for the [facility] Multi-Cancer Panel.  The [facility] Multi-Cancer Panel analyzes 84 genes associated with increased risk for cancer: AIP, ALK, APC, ZENY, AXIN2, BAP1, BARD1, BLM, BMPR1A, BRCA1, BRCA2, BRIP1, CASR,CDC73, CDH1, CDK4, CDKN1B, CDKN1C, CDKN2A, CEBPA, CHEK2, CTNNA1, DICER1,DIS3L2, EGFR, EPCAM, FH, FLCN, GATA2, GPC3, GREM1, HOXB13, HRAS, KIT, MAX,MEN1, MET, MITF, MLH1, MSH2, MSH3, MSH6, MUTYH, NBN, NF1, NF2, NTHL1,PALB2, PDGFRA, PHOX2B, PMS2, POLD1, POLE, POT1, [id number], PTCH1, PTEN, RAD50,RAD51C, RAD51D, RB1, RECQL4, RET, RUNX1, SDHA, SDHAF2, SDHB, SDHC, SDHD,SMAD4, SMARCA4, SMARCB1, SMARCE1, STK11, SUFU, TERC, TERT, [id number], TP53,TSC1, TSC2, VHL, WRN, WT1.  This panel includes genes associated with hereditary cancers across multiple major organ systems, including: breast and gynecologic (breast, ovarian, uterine), gastrointestinal (colorectal, gastric, pancreatic), endocrine (thyroid, paraganglioma/pheochromocytoma, parathyroid, pituitary), genitourinary (renal/urinary tract, prostate), skin (melanoma, basal cell carcinoma), brain/nervous system, sarcoma, and hematologic (myelodysplastic syndrome/leukemia).  Individuals who are found to carry a mutation in one of these genes are at increased risk of developing certain  cancers/tumors. Depending on the gene mutation found, identifying those at elevated risk may guide implementation of additional screening, surveillance, and other interventions.    Consent was obtained over the video and Ryan elected to schedule a lab appointment at his earliest convenience. Ryan expressed significant concerns regarding potential out of pocket costs for the test and we discussed [facility]'s insurance billing policy versus submitting a pre-authorization request prior to sample collection. After reviewing these options, Ryan stated that he would prefer to receive test results quickly and declined submitting a pre-authorization request prior to sample collection. Once his blood sample is collected, genetic testing using the Multi-Cancer Panel will be sent to [facility]. Of note, testing will begin with the Stovall syndrome genes with reflex to the complete panel. Turn around time: 2-3 weeks after [facility] receives his blood sample.  Medical Management: For Ryan, we reviewed that the information from genetic testing may determine:  surgery to treat Ryan's active cancer diagnosis (i.e. observation versus colectomy, etc.),  additional cancer screening for which Ryan may qualify (i.e. more frequent colonoscopies, regular upper endoscopies, more frequent dermatologic exams, etc.),  and targeted chemotherapies if he were to develop certain cancers in the future (i.e. immunotherapy for individuals with Stovall syndrome, PARP inhibitors, etc.).   These recommendations and possible targeted chemotherapies will be discussed in detail once genetic testing is completed.     Plan:  1) Today Ryan elected to proceed with testing of the Stovall syndrome genes, with reflex to the [facility] Multi-Cancer Panel, through [facility]. He will schedule a lab appointment at his earliest convenience.  2) The results should be available 2-3 weeks after [facility] receives his blood sample.  3) Ryan will be contacted to schedule a virtual  visit to discuss the results.    Sofiya Garcia MS, [facility] [place]  Licensed, Certified Genetic Counselor  Office: [phone]  Pager: [phone]

## 2023-05-12 ENCOUNTER — LAB (OUTPATIENT)
Dept: LAB | Facility: CLINIC | Age: 53
End: 2023-05-12
Payer: COMMERCIAL

## 2023-05-12 DIAGNOSIS — Z80.8 FAMILY HISTORY OF MELANOMA: ICD-10-CM

## 2023-05-12 DIAGNOSIS — C18.2 MALIGNANT NEOPLASM OF ASCENDING COLON (H): ICD-10-CM

## 2023-05-12 DIAGNOSIS — Z80.0 FAMILY HISTORY OF COLON CANCER: ICD-10-CM

## 2023-05-12 LAB
ALBUMIN SERPL BCG-MCNC: 4.5 G/DL (ref 3.5–5.2)
ALP SERPL-CCNC: 74 U/L (ref 40–129)
ALT SERPL W P-5'-P-CCNC: 22 U/L (ref 10–50)
ANION GAP SERPL CALCULATED.3IONS-SCNC: 11 MMOL/L (ref 7–15)
AST SERPL W P-5'-P-CCNC: 20 U/L (ref 10–50)
BILIRUB SERPL-MCNC: 0.3 MG/DL
BUN SERPL-MCNC: 14.8 MG/DL (ref 6–20)
CALCIUM SERPL-MCNC: 9.6 MG/DL (ref 8.6–10)
CHLORIDE SERPL-SCNC: 105 MMOL/L (ref 98–107)
CREAT SERPL-MCNC: 0.99 MG/DL (ref 0.67–1.17)
DEPRECATED HCO3 PLAS-SCNC: 24 MMOL/L (ref 22–29)
GFR SERPL CREATININE-BSD FRML MDRD: >90 ML/MIN/1.73M2
GLUCOSE SERPL-MCNC: 94 MG/DL (ref 70–99)
POTASSIUM SERPL-SCNC: 4.3 MMOL/L (ref 3.4–5.3)
PROT SERPL-MCNC: 7 G/DL (ref 6.4–8.3)
SODIUM SERPL-SCNC: 140 MMOL/L (ref 136–145)

## 2023-05-12 PROCEDURE — 99000 SPECIMEN HANDLING OFFICE-LAB: CPT

## 2023-05-12 PROCEDURE — 80053 COMPREHEN METABOLIC PANEL: CPT

## 2023-05-12 PROCEDURE — 36415 COLL VENOUS BLD VENIPUNCTURE: CPT

## 2023-05-16 ENCOUNTER — VIRTUAL VISIT (OUTPATIENT)
Dept: ONCOLOGY | Facility: CLINIC | Age: 53
End: 2023-05-16
Payer: COMMERCIAL

## 2023-05-16 DIAGNOSIS — C18.2 MALIGNANT NEOPLASM OF ASCENDING COLON (H): Primary | ICD-10-CM

## 2023-05-16 PROCEDURE — 99213 OFFICE O/P EST LOW 20 MIN: CPT | Mod: 95 | Performed by: INTERNAL MEDICINE

## 2023-05-16 NOTE — PROGRESS NOTES
Oncology follow-up visit:  Date on this visit: 5/16/23     Ryan Briseno  is referred by Dr.Paolo Mancini for an oncology consultation. He requires evaluation for new diagnosis of colon adenocarcinoma    Primary Physician: Mary Cline     History Of Present Illness:  Mr. Briseno is a 52 year old male who presents for follow-up of colon adenocarcinoma.  I initially saw him on 5/10/2023.  Please see my previous note for details.  I have copied and updated from prior notes.    I have reviewed notes from colorectal surgeon Dr. Mancini    He had a positive Cologuard test so underwent colonoscopy on 12/30/2022.  It showed 35 mm sessile polyp in the ascending colon, about 10 cm from the ileocecal valve.  This was not resected and biopsies showed tubulovillous adenoma.   Two sessile polyps were found in the transverse colon. The polyps were 7 to 9 mm in size. These polyps were removed with a cold snare.  Pathology showed tubular adenoma.    Two sessile polyps were found in the sigmoid colon. The polyps were 5 to 7 mm in size. These polyps were removed with a cold snare.  Pathology showed hyperplastic polyps    On 3/17/2023 he had endoscopic resection by Dr. Jaffe of the ascending colon polyp.  Pathology demonstrated focal adenocarcinoma arising in a tubular adenoma with high-grade dysplasia.  The focus of adenocarcinoma was <1mm and invaded into the submucosa approximately 0.5mm (SM1).  Margins were negative.  No evidence of lymphovascular invasion perineural invasion or tumor budding.  MMR is staining was attempted but adenocarcinoma was not identified.    3/25/2023.  CT chest abdomen and pelvis showed a tiny hypodensity in the subcapsular left hepatic lobe.  12 mm enlarged portacaval lymph node with several surrounding prominent lymph nodes.  1 cm right adrenal nodule was also seen.  No evidence of metastatic disease in the chest.    CT abdomen on 3/31/2023 showed 1 cm benign right adrenal adenoma.  11 mm  prominent tamia hepatic lymph node was seen.    4/14/2023.  MRI abdomen with Eovist showed the right adrenal gland nodule consistent with benign adenoma.  Subcentimeter short axis portacaval lymph node.  Benign simple cyst of the liver was seen.  No concerning liver lesions were seen.    He met with Dr. Mancini who discussed with him option of proceeding with a right hemicolectomy versus observation alone.  When he saw me on 5/10/2023, I had discussed the situation with him in detail.  He wanted to discuss with his wife before giving us final answer.   I had also recommended that he speaks with genetic counselor because of personal and family history of cancers to consider genetic testing.     Interval history.   This is a video visit.  He had some more questions so this appointment was made.  Overall he continues to feel well.        ECOG 0    ROS:  A ROS was otherwise neg    I reviewed other history in epic as below.      Past Medical/Surgical History:  Past Medical History:   Diagnosis Date     Hypertension      Past Surgical History:   Procedure Laterality Date     COLONOSCOPY N/A 12/30/2022    Procedure: COLONOSCOPY, WITH POLYPECTOMY AND BIOPSY;  Surgeon: Tyler Pandey MD;  Location: [facility]     COLONOSCOPY N/A 12/30/2022    Procedure: COLONOSCOPY, FLEXIBLE, WITH LESION REMOVAL USING SNARE;  Surgeon: Tyler Pandey MD;  Location: [facility]     COLONOSCOPY WITH CO2 INSUFFLATION N/A 12/30/2022    Procedure: COLONOSCOPY, WITH CO2 INSUFFLATION;  Surgeon: Tyler Pandey MD;  Location: [facility]     COLONOSCOPY, SUBMUCOSA RESECTION N/A 3/17/2023    Procedure: SUBMUCOSAL RESECTION;  Surgeon: Jos Jaffe MD;  Location: [facility]     Cancer History:   As above    Allergies:  Allergies as of 05/16/2023 - Reviewed 05/16/2023   Allergen Reaction Noted     Penicillins Hives 02/01/2021     Current Medications:  No current outpatient medications on file.      Family History:  Family History    Problem Relation Age of Onset     Hypertension Mother      Colon Cancer Mother      Cerebrovascular Disease Maternal Grandfather      Colon Cancer Paternal Grandfather    mother had colon cancer in her 60s.  Paternal grand father had rectal cancer  He has 3 kids- all healthy      Social History:  Social History     Socioeconomic History     Marital status:      Spouse name: Not on file     Number of children: Not on file     Years of education: Not on file     Highest education level: Not on file   Occupational History     Not on file   Tobacco Use     Smoking status: Every Day     Packs/day: 0.50     Years: 30.00     Pack years: 15.00     Types: Cigarettes     Smokeless tobacco: Never     Tobacco comments:     Have smoked foe 30yrs and tried to quit 3 times.   Vaping Use     Vaping status: Never Used   Substance and Sexual Activity     Alcohol use: Not Currently     Comment: not for 5 years      Drug use: Not Currently     Comment: usage was back in my 20's and 30's.     Sexual activity: Not Currently     Partners: Female     Birth control/protection: None   Other Topics Concern     Parent/sibling w/ CABG, MI or angioplasty before 65F 55M? No   Social History Narrative     Not on file     Social Determinants of Health     Financial Resource Strain: Not on file   Food Insecurity: Not on file   Transportation Needs: Not on file   Physical Activity: Not on file   Stress: Not on file   Social Connections: Not on file   Intimate Partner Violence: Not on file   Housing Stability: Not on file       Smokes about 1/2 ppd and used to take 1.5 to 2 ppd  Last etoh was in 2017  Lives with wife and 2 kids.  Works at [employer]                                                                                                                                                                                          Physical Exam:  There were no vitals taken for this visit.    Wt Readings from Last 4 Encounters:   05/10/23 110  kg (242 lb 9.6 oz)   03/17/23 112.9 kg (248 lb 14.4 oz)   03/01/23 112.9 kg (249 lb)   05/04/22 114.2 kg (251 lb 11.2 oz)         Constitutional.  Looks well and in no apparent distress.   Eyes.  Without eye redness or apparent jaundice.   Respiratory.  Non labored breathing. Speaking in full sentences.    Skin.  No concerning skin rashes on the skin visualized.   Neurological.  Is alert and oriented.  Psychiatric.  Mood and affect seem appropriate.      The rest of a comprehensive physical examination is deferred due to Public [facility] video visit restrictions.      Laboratory/Imaging Studies      Reviewed    5/10/2023   CBC unremarkable.  CEA 2.8.      CMP unremarkable on 5/12/2023         Imaging/colonoscopy/pathology reviewed and mentioned above.      ASSESSMENT/PLAN:      Stage I colon adenocarcinoma which was found in the background of sessile tubulovillous adenoma with high-grade dysplasia which was removed endoscopically on 3/17/2023.  No lymphovascular or perineural invasion or tumor budding.  All margins were negative.  Unable to determine MMR status.    There is slightly prominent tamia hepatis lymph nodes which is indeterminate.  No other evidence of metastatic disease.    He has benign hepatic cyst and right adrenal adenoma.    I had discussed with him that his chances of cure from current resection are higher, although there is a small risk that cancer could have spread to pericolonic lymph nodes which have not been resected.  Our options include going ahead with a right hemicolectomy versus observation.      We had discussed pros and cons of each approach.  He had more questions today about it.  He was seen by genetic counselor and genetic testing is planned.  He was thinking that if the genetic testing would be positive, then he would go with surgery.  We discussed that the purpose of genetic testing is to see whether there is a hereditary component to the cancer.  It can also tell us whether  he would be a higher risk to develop any other cancers against which more strict screening could be done.  It would also be a good piece of information for his family members.  That being said, genetic testing should not be the sole determinant whether he should go with surgery versus observation because it will not give us this answer clearly.      He mentions that he thinks that now he has a clear-cut understanding of the situation and that he will discuss with his wife and he will let us know about his decision in the next few days.    With regards to the periportal lymph node, this is indeterminate and this will need serial monitoring.  For that we will repeat CT abdomen/pelvis in 6 months.    His surveillance would include colonoscopy in March 2024.      We did not address the following today    Discussion regarding smoking.  Strongly encouraged him to quit smoking.  Congratulated him on his efforts in cutting down smoking.  I offered him referral to smoking cessation program but at this time he is not interested.      We will determine the follow-up accordingly.    All of his questions were answered to his satisfaction.  He is agreeable and comfortable with the plan.    Mark Leslie MD

## 2023-05-16 NOTE — NURSING NOTE
Is the patient currently in the state of [place]? YES    Visit mode:TELEPHONE    If the visit is dropped, the patient can be reconnected by: TELEPHONE VISIT: Phone number: [phone]    Will anyone else be joining the visit? NO      How would you like to obtain your AVS? MyChart    Are changes needed to the allergy or medication list? NO    Reason for visit: Telephone and Follow Up    Tiffani SCHMIDT

## 2023-05-16 NOTE — LETTER
5/16/2023         RE: Ryan Briseno  [address]  [place] [facility] [address]        Dear Colleague,    Thank you for referring your patient, Ryan Briseno, to the [facility]. Please see a copy of my visit note below.    Virtual Visit Details    Type of service:  Video visit   Video start time. 8:30 AM  Video stop time. 8:44 AM   Video Platform used. Doximity  Patient location. Work  Provider location. Off site.    Oncology follow-up visit:  Date on this visit: 5/16/23     Ryan Briseno  is referred by Dr.Paolo Mancini for an oncology consultation. He requires evaluation for new diagnosis of colon adenocarcinoma    Primary Physician: Mary Cline     History Of Present Illness:  Mr. Briseno is a 52 year old male who presents for follow-up of colon adenocarcinoma.  I initially saw him on 5/10/2023.  Please see my previous note for details.  I have copied and updated from prior notes.    I have reviewed notes from colorectal surgeon Dr. Mancini    He had a positive Cologuard test so underwent colonoscopy on 12/30/2022.  It showed 35 mm sessile polyp in the ascending colon, about 10 cm from the ileocecal valve.  This was not resected and biopsies showed tubulovillous adenoma.   Two sessile polyps were found in the transverse colon. The polyps were 7 to 9 mm in size. These polyps were removed with a cold snare.  Pathology showed tubular adenoma.    Two sessile polyps were found in the sigmoid colon. The polyps were 5 to 7 mm in size. These polyps were removed with a cold snare.  Pathology showed hyperplastic polyps    On 3/17/2023 he had endoscopic resection by Dr. Jaffe of the ascending colon polyp.  Pathology demonstrated focal adenocarcinoma arising in a tubular adenoma with high-grade dysplasia.  The focus of adenocarcinoma was <1mm and invaded into the submucosa approximately 0.5mm (SM1).  Margins were negative.  No evidence of lymphovascular invasion perineural invasion  or tumor budding.  MMR is staining was attempted but adenocarcinoma was not identified.    3/25/2023.  CT chest abdomen and pelvis showed a tiny hypodensity in the subcapsular left hepatic lobe.  12 mm enlarged portacaval lymph node with several surrounding prominent lymph nodes.  1 cm right adrenal nodule was also seen.  No evidence of metastatic disease in the chest.    CT abdomen on 3/31/2023 showed 1 cm benign right adrenal adenoma.  11 mm prominent tamia hepatic lymph node was seen.    4/14/2023.  MRI abdomen with Eovist showed the right adrenal gland nodule consistent with benign adenoma.  Subcentimeter short axis portacaval lymph node.  Benign simple cyst of the liver was seen.  No concerning liver lesions were seen.    He met with Dr. Mancini who discussed with him option of proceeding with a right hemicolectomy versus observation alone.  When he saw me on 5/10/2023, I had discussed the situation with him in detail.  He wanted to discuss with his wife before giving us final answer.   I had also recommended that he speaks with genetic counselor because of personal and family history of cancers to consider genetic testing.     Interval history.   This is a video visit.  He had some more questions so this appointment was made.  Overall he continues to feel well.        ECOG 0    ROS:  A ROS was otherwise neg    I reviewed other history in epic as below.      Past Medical/Surgical History:  Past Medical History:   Diagnosis Date     Hypertension      Past Surgical History:   Procedure Laterality Date     COLONOSCOPY N/A 12/30/2022    Procedure: COLONOSCOPY, WITH POLYPECTOMY AND BIOPSY;  Surgeon: Tyler Pandey MD;  Location: [facility]     COLONOSCOPY N/A 12/30/2022    Procedure: COLONOSCOPY, FLEXIBLE, WITH LESION REMOVAL USING SNARE;  Surgeon: Tyler Pandey MD;  Location: [facility]     COLONOSCOPY WITH CO2 INSUFFLATION N/A 12/30/2022    Procedure: COLONOSCOPY, WITH CO2 INSUFFLATION;  Surgeon:  Tyler Pandey MD;  Location: [facility]     COLONOSCOPY, SUBMUCOSA RESECTION N/A 3/17/2023    Procedure: SUBMUCOSAL RESECTION;  Surgeon: Jos Jaffe MD;  Location: [facility]     Cancer History:   As above    Allergies:  Allergies as of 05/16/2023 - Reviewed 05/16/2023   Allergen Reaction Noted     Penicillins Hives 02/01/2021     Current Medications:  No current outpatient medications on file.      Family History:  Family History   Problem Relation Age of Onset     Hypertension Mother      Colon Cancer Mother      Cerebrovascular Disease Maternal Grandfather      Colon Cancer Paternal Grandfather    mother had colon cancer in her 60s.  Paternal grand father had rectal cancer  He has 3 kids- all healthy      Social History:  Social History     Socioeconomic History     Marital status:      Spouse name: Not on file     Number of children: Not on file     Years of education: Not on file     Highest education level: Not on file   Occupational History     Not on file   Tobacco Use     Smoking status: Every Day     Packs/day: 0.50     Years: 30.00     Pack years: 15.00     Types: Cigarettes     Smokeless tobacco: Never     Tobacco comments:     Have smoked foe 30yrs and tried to quit 3 times.   Vaping Use     Vaping status: Never Used   Substance and Sexual Activity     Alcohol use: Not Currently     Comment: not for 5 years      Drug use: Not Currently     Comment: usage was back in my 20's and 30's.     Sexual activity: Not Currently     Partners: Female     Birth control/protection: None   Other Topics Concern     Parent/sibling w/ CABG, MI or angioplasty before 65F 55M? No   Social History Narrative     Not on file     Social Determinants of Health     Financial Resource Strain: Not on file   Food Insecurity: Not on file   Transportation Needs: Not on file   Physical Activity: Not on file   Stress: Not on file   Social Connections: Not on file   Intimate Partner Violence: Not on file   Housing  Stability: Not on file       Smokes about 1/2 ppd and used to take 1.5 to 2 ppd  Last etoh was in 2017  Lives with wife and 2 kids.  Works at [employer]                                                                                                                                                                                          Physical Exam:  There were no vitals taken for this visit.    Wt Readings from Last 4 Encounters:   05/10/23 110 kg (242 lb 9.6 oz)   03/17/23 112.9 kg (248 lb 14.4 oz)   03/01/23 112.9 kg (249 lb)   05/04/22 114.2 kg (251 lb 11.2 oz)         Constitutional.  Looks well and in no apparent distress.   Eyes.  Without eye redness or apparent jaundice.   Respiratory.  Non labored breathing. Speaking in full sentences.    Skin.  No concerning skin rashes on the skin visualized.   Neurological.  Is alert and oriented.  Psychiatric.  Mood and affect seem appropriate.      The rest of a comprehensive physical examination is deferred due to [facility] Emergency video visit restrictions.      Laboratory/Imaging Studies      Reviewed    5/10/2023   CBC unremarkable.  CEA 2.8.      CMP unremarkable on 5/12/2023         Imaging/colonoscopy/pathology reviewed and mentioned above.      ASSESSMENT/PLAN:      Stage I colon adenocarcinoma which was found in the background of sessile tubulovillous adenoma with high-grade dysplasia which was removed endoscopically on 3/17/2023.  No lymphovascular or perineural invasion or tumor budding.  All margins were negative.  Unable to determine MMR status.    There is slightly prominent tamia hepatis lymph nodes which is indeterminate.  No other evidence of metastatic disease.    He has benign hepatic cyst and right adrenal adenoma.    I had discussed with him that his chances of cure from current resection are higher, although there is a small risk that cancer could have spread to pericolonic lymph nodes which have not been resected.  Our options include  going ahead with a right hemicolectomy versus observation.      We had discussed pros and cons of each approach.  He had more questions today about it.  He was seen by genetic counselor and genetic testing is planned.  He was thinking that if the genetic testing would be positive, then he would go with surgery.  We discussed that the purpose of genetic testing is to see whether there is a hereditary component to the cancer.  It can also tell us whether he would be a higher risk to develop any other cancers against which more strict screening could be done.  It would also be a good piece of information for his family members.  That being said, genetic testing should not be the sole determinant whether he should go with surgery versus observation because it will not give us this answer clearly.      He mentions that he thinks that now he has a clear-cut understanding of the situation and that he will discuss with his wife and he will let us know about his decision in the next few days.    With regards to the periportal lymph node, this is indeterminate and this will need serial monitoring.  For that we will repeat CT abdomen/pelvis in 6 months.    His surveillance would include colonoscopy in March 2024.      We did not address the following today    Discussion regarding smoking.  Strongly encouraged him to quit smoking.  Congratulated him on his efforts in cutting down smoking.  I offered him referral to smoking cessation program but at this time he is not interested.      We will determine the follow-up accordingly.    All of his questions were answered to his satisfaction.  He is agreeable and comfortable with the plan.    Mark Leslie MD           Again, thank you for allowing me to participate in the care of your patient.        Sincerely,        Mark Leslie MD

## 2023-05-16 NOTE — PROGRESS NOTES
Virtual Visit Details    Type of service:  Video visit   Video start time. 8:30 AM  Video stop time. 8:44 AM   Video Platform used. Doximity  Patient location. Work  Provider location. Off site.

## 2023-05-22 LAB — SCANNED LAB RESULT: NORMAL

## 2023-06-12 ENCOUNTER — VIRTUAL VISIT (OUTPATIENT)
Dept: ONCOLOGY | Facility: CLINIC | Age: 53
End: 2023-06-12
Attending: GENETIC COUNSELOR, MS
Payer: COMMERCIAL

## 2023-06-12 DIAGNOSIS — Z80.8 FAMILY HISTORY OF MELANOMA: ICD-10-CM

## 2023-06-12 DIAGNOSIS — Z80.0 FAMILY HISTORY OF COLON CANCER: ICD-10-CM

## 2023-06-12 DIAGNOSIS — C18.2 MALIGNANT NEOPLASM OF ASCENDING COLON (H): Primary | ICD-10-CM

## 2023-06-12 PROCEDURE — 999N000069 HC STATISTIC GENETIC COUNSELING, < 16 MIN: Mod: TEL,95 | Performed by: GENETIC COUNSELOR, MS

## 2023-06-12 NOTE — PROGRESS NOTES
"6/12/2023    Virtual Visit Details  Type of service:  Telephone Visit   Phone call duration: 15 minutes     Referring Provider: Mark Leslie MD    Presenting Information:  I spoke to Ryan by phone today to discuss his genetic testing results. We last met on 5/11/2023 and his blood was drawn on 5/12/2023. The [facility] Multi-Cancer Panel was ordered from [facility]. This testing was done because of Ryan's personal and family history of colorectal cancer.    Genetic Testing Result: Variant of Uncertain Significance (VUS)  Ryan was found to have a variant of uncertain significance (VUS) in the BMPR1A gene. This variant is called c.712C>T (p.[id number]). No other variants or mutations were found in the BMPR1A gene. Given the uncertain significance of this result, medical management decisions should NOT be made based on this test result alone.    Of note, Ryna tested negative for variants and mutations in the following genes by sequencing and deletion/duplication analysis (unless otherwise specified in the test report): AIP, ALK, APC, ZENY, AXIN2, BAP1, BARD1, BLM, BRCA1, BRCA2, BRIP1, CASR,CDC73, CDH1, CDK4, CDKN1B, CDKN1C, CDKN2A, CEBPA, CHEK2, CTNNA1, DICER1,DIS3L2, EGFR, EPCAM, FH, FLCN, GATA2, GPC3, GREM1, HOXB13, HRAS, KIT, MAX,MEN1, MET, MITF, MLH1, MSH2, MSH3, MSH6, MUTYH, NBN, NF1, NF2, NTHL1,PALB2, PDGFRA, PHOX2B, PMS2, POLD1, POLE, POT1, OTLEL1M, PTCH1, PTEN, RAD50,RAD51C, RAD51D, RB1, RECQL4, RET, RUNX1, SDHA, SDHAF2, SDHB, SDHC, SDHD,SMAD4, SMARCA4, SMARCB1, SMARCE1, STK11, SUFU, TERC, TERT, HOLU520, TP53,TSC1, TSC2, VHL, WRN, WT1.    We reviewed the autosomal dominant inheritance of these genes.     Ryan cannot pass on a mutation in any of these genes to his children based on this test result. Mutations in these genes do not skip generations.      A copy of the test report can be found in the Laboratory tab, dated 5/12/2023, and named \"LABORATORY MISCELLANEOUS ORDER\". The report is scanned in as a linked " document.    Interpretation:  We discussed several different interpretations of this inconclusive test result. It is not clear if this variant in the BMPR1A gene is associated with increased cancer risk.  1. This variant may be a benign change that does not increase cancer risk.  2. This variant may be a harmful mutation that causes autosomal dominant Juvenile Polyposis syndrome.    Genetic testing labs are working to collect evidence to determine if this variant is harmful or benign, and [facility] will contact me if it is reclassified. If this variant is determined to be a benign change, there may be a different gene or combination of genes and environment that are associated with the cancers in this family that are not identifiable using this test. As such, Ryan is encouraged to contact me regularly to review any new genetic testing options that may be appropriate for him.    It is also important to consider that his colon cancer may be sporadic and caused by random cellular changes.    Inheritance:  We reviewed the autosomal dominant inheritance of this variant in the BMPR1A gene. We discussed that Ryan has a 50% chance to pass this variant to each of his children. Other relatives may carry the variant, as well. Because it is unclear what, if any, risk is associated with this variant, clinical genetic testing for this BMPR1A variant alone is not recommended for relatives.    Screening:  Based on this inconclusive test result, it is important for Ryan and his relatives to refer back to the family history for appropriate cancer screening.      Ryan should continue to follow all colon cancer treatment and follow-up recommendations as made by his medical providers  Per current National Comprehensive Cancer Network (NCCN) guidelines, individuals with a first degree relative with colorectal cancer diagnosed at any age should begin colonoscopy at age 40, or 10 years before the earliest diagnosis of colorectal cancer,  whichever is first. In this family, colonoscopy should start at age 40 and be repeated every 5 years, or based on colonoscopy findings. This would apply to Ryan's children and father. These recommendations may change based on personal and family history as well as personal preference, and should be discussed with an individual's physician.        Due to the close family history of melanoma, Ryan remains at some increased risk for developing melanoma. According to the American Cancer Society, Ryan is encouraged to speak to his primary care provider about having regular skin exams and safe skin practices (i.e. sunscreen, self skin exams, limited sun exposure, etc.).    Other population cancer screening options, such as those recommended by the American Cancer Society and the NCCN, are also appropriate for Ryan and his family. These screening recommendations may change if there are changes to Ryan's personal and/or family history of cancer. Final screening recommendations should be made by each individual's primary care provider.      Additional Testing Considerations:  Although Ryan's genetic testing result is inconclusive, there still remains a small chance other relatives may carry a harmful gene mutation associated with hereditary cancer. If any of his relatives do pursue genetic testing, Ryan is encouraged to contact me so that we may review the impact of their test results on him.    Summary:  We do not have an explanation for Ryan's colon cancer. While no obviously harmful genetic changes were identified, Ryan may still be at risk for certain cancers due to family history, environmental factors, or other genetic causes not identified by this test. Because of that, it is important that he continue with cancer screening based on his personal and family history as discussed above.    Genetic testing is rapidly advancing, and new cancer susceptibility genes will most likely be identified in the future. Therefore, I  encouraged Ryan to contact me periodically or if there are changes in his personal or family history. This may change how we assess his cancer risk, screening, and the testing we would offer.    Plan:  1. At his request, I provided Ryan with a copy of his test results via [facility]'s patient portal.    2. He plans to follow-up with his medical providers, as needed.  3. He should contact me regularly, or sooner if his family history changes.  4. I will attempt to contact Ryan if the laboratory informs me that this BMPR1A variant has been reclassified. This may change screening and testing recommendations for Ryan and his relatives.    If yRan has any further questions, I encouraged him to contact me at [phone].    Sofiya Garcia MS, [facility] [place]  Licensed, Certified Genetic Counselor  Office: [phone]  Pager: [phone]

## 2023-06-12 NOTE — NURSING NOTE
Is the patient currently in the state of [place]? YES    Visit mode:TELEPHONE    If the visit is dropped, the patient can be reconnected by: TELEPHONE VISIT: Phone number: [phone]    Will anyone else be joining the visit? NO      How would you like to obtain your AVS? MyChart    Are changes needed to the allergy or medication list? NO    Reason for visit: RECHECK      Jolene SCHMIDT

## 2023-06-12 NOTE — LETTER
6/12/2023         RE: Ryan Briseno  [address]  [place] [address]        Dear Colleague,    Thank you for referring your patient, Ryan Briseno, to the [facility]. Please see a copy of my visit note below.    6/12/2023    Virtual Visit Details  Type of service:  Telephone Visit   Phone call duration: 15 minutes     Referring Provider: Mark Leslie MD    Presenting Information:  I spoke to Ryan by phone today to discuss his genetic testing results. We last met on 5/11/2023 and his blood was drawn on 5/12/2023. The [facility] Multi-Cancer Panel was ordered from [facility]. This testing was done because of Ryan's personal and family history of colorectal cancer.    Genetic Testing Result: Variant of Uncertain Significance (VUS)  Ryan was found to have a variant of uncertain significance (VUS) in the BMPR1A gene. This variant is called c.712C>T (p.[id number]). No other variants or mutations were found in the BMPR1A gene. Given the uncertain significance of this result, medical management decisions should NOT be made based on this test result alone.    Of note, Ryan tested negative for variants and mutations in the following genes by sequencing and deletion/duplication analysis (unless otherwise specified in the test report): AIP, ALK, APC, ZENY, AXIN2, BAP1, BARD1, BLM, BRCA1, BRCA2, BRIP1, CASR,CDC73, CDH1, CDK4, CDKN1B, CDKN1C, CDKN2A, CEBPA, CHEK2, CTNNA1, DICER1,DIS3L2, EGFR, EPCAM, FH, FLCN, GATA2, GPC3, GREM1, HOXB13, HRAS, KIT, MAX,MEN1, MET, MITF, MLH1, MSH2, MSH3, MSH6, MUTYH, NBN, NF1, NF2, NTHL1,PALB2, PDGFRA, PHOX2B, PMS2, POLD1, POLE, POT1, QCGUO9A, PTCH1, PTEN, RAD50,RAD51C, RAD51D, RB1, RECQL4, RET, RUNX1, SDHA, SDHAF2, SDHB, SDHC, SDHD,SMAD4, SMARCA4, SMARCB1, SMARCE1, STK11, SUFU, TERC, TERT, LFAP904, TP53,TSC1, TSC2, VHL, WRN, WT1.  We reviewed the autosomal dominant inheritance of these genes.   Ryan cannot pass on a mutation in any of these genes to his  "children based on this test result. Mutations in these genes do not skip generations.      A copy of the test report can be found in the Laboratory tab, dated 5/12/2023, and named \"LABORATORY MISCELLANEOUS ORDER\". The report is scanned in as a linked document.    Interpretation:  We discussed several different interpretations of this inconclusive test result. It is not clear if this variant in the BMPR1A gene is associated with increased cancer risk.  1. This variant may be a benign change that does not increase cancer risk.  2. This variant may be a harmful mutation that causes autosomal dominant Juvenile Polyposis syndrome.    Genetic testing labs are working to collect evidence to determine if this variant is harmful or benign, and [facility] will contact me if it is reclassified. If this variant is determined to be a benign change, there may be a different gene or combination of genes and environment that are associated with the cancers in this family that are not identifiable using this test. As such, Ryan is encouraged to contact me regularly to review any new genetic testing options that may be appropriate for him.    It is also important to consider that his colon cancer may be sporadic and caused by random cellular changes.    Inheritance:  We reviewed the autosomal dominant inheritance of this variant in the BMPR1A gene. We discussed that Ryan has a 50% chance to pass this variant to each of his children. Other relatives may carry the variant, as well. Because it is unclear what, if any, risk is associated with this variant, clinical genetic testing for this BMPR1A variant alone is not recommended for relatives.    Screening:  Based on this inconclusive test result, it is important for Ryan and his relatives to refer back to the family history for appropriate cancer screening.    Ryan should continue to follow all colon cancer treatment and follow-up recommendations as made by his medical providers  Per " current National Comprehensive Cancer Network (NCCN) guidelines, individuals with a first degree relative with colorectal cancer diagnosed at any age should begin colonoscopy at age 40, or 10 years before the earliest diagnosis of colorectal cancer, whichever is first. In this family, colonoscopy should start at age 40 and be repeated every 5 years, or based on colonoscopy findings. This would apply to Ryan's children and father. These recommendations may change based on personal and family history as well as personal preference, and should be discussed with an individual's physician.      Due to the close family history of melanoma, Ryan remains at some increased risk for developing melanoma. According to the American Cancer Society, Ryan is encouraged to speak to his primary care provider about having regular skin exams and safe skin practices (i.e. sunscreen, self skin exams, limited sun exposure, etc.).  Other population cancer screening options, such as those recommended by the American Cancer Society and the NCCN, are also appropriate for Ryan and his family. These screening recommendations may change if there are changes to Ryan's personal and/or family history of cancer. Final screening recommendations should be made by each individual's primary care provider.      Additional Testing Considerations:  Although Ryan's genetic testing result is inconclusive, there still remains a small chance other relatives may carry a harmful gene mutation associated with hereditary cancer. If any of his relatives do pursue genetic testing, Ryan is encouraged to contact me so that we may review the impact of their test results on him.    Summary:  We do not have an explanation for Ryan's colon cancer. While no obviously harmful genetic changes were identified, Ryan may still be at risk for certain cancers due to family history, environmental factors, or other genetic causes not identified by this test. Because of that, it is  important that he continue with cancer screening based on his personal and family history as discussed above.    Genetic testing is rapidly advancing, and new cancer susceptibility genes will most likely be identified in the future. Therefore, I encouraged Ryan to contact me periodically or if there are changes in his personal or family history. This may change how we assess his cancer risk, screening, and the testing we would offer.    Plan:  1. At his request, I provided Ryan with a copy of his test results via [facility]'s patient portal.    2. He plans to follow-up with his medical providers, as needed.  3. He should contact me regularly, or sooner if his family history changes.  4. I will attempt to contact Ryan if the laboratory informs me that this BMPR1A variant has been reclassified. This may change screening and testing recommendations for Ryan and his relatives.    If Ryan has any further questions, I encouraged him to contact me at [phone].    Sofiya Garcia MS, [facility] [place]  Licensed, Certified Genetic Counselor  Office: [phone]  Pager: [phone]

## 2023-06-14 NOTE — PATIENT INSTRUCTIONS
Genetic Testing  Genetic testing involved a blood or saliva test which looked at the genetic information in select genes for variants associated with cancer risk.  This testing may have included analysis of a single gene due to a known variant in the family, multiple genes most associated with the cancers in a family, or an expanded panel of genes related to many types of cancers.    Results  There are several possible genetic test results, including:   Positive--a harmful mutation (also known as a  pathogenic  or  likely pathogenic  variant) was identified in a gene associated with increased cancer risk.  These risks, as well as medical management options, depend on the specific genetic variant identified.    Negative--no variants were identified in the genes analyzed   Variant of unknown significance--a variant was identified in one or more genes, though it is currently unclear how this impacts cancer risk in the family.  Genetic testing labs are working to collect evidence about these uncertain variants and may provide updates in the future.    What is a Variant of Unknown Significance?  A variant of unknown significance (VUS) is a genetic change with unclear clinical significance.  Scientists currently do not know if this specific variant is associated with increased cancer risks,  or if it is a benign (harmless) change with no impact on health.       A variant may be of uncertain significance for several reasons.  It is possible that this specific variant has not been seen before by the laboratory, or only in a small number of families.  There is currently not enough information to know how this variant may impact your health.          Reclassification  Genetic testing laboratories and researchers are collecting evidence to determine the importance of variants of unknown significance.  Many variants of uncertain significance are later reclassified as benign findings, however some may be associated with  increased cancer risk.  Laboratories will often notify the genetic counselor/ordering provider when a patient s VUS has been reclassified.        Some families may be candidates for participation in the laboratory s variant research programs to help determine the importance of their VUS.  Participating in these programs does not guarantee that families will learn the significance of their VUS immediately.  It could be months or years before a VUS is reclassified.       Screening Recommendations  A combination of personal and family history factors may inform cancer risk and medical management recommendations.  Population cancer screening options, such as those recommended by the American Cancer Society and the National Comprehensive Cancer Network (NCCN) are appropriate for many families at average risk for cancer.  However, earlier and/or more frequent screening may be recommended based on personal factors (lifestyle, exposures, medications, screening results), family history of cancer, and sometimes genetic factors.  These cancer risk management options should be discussed in more detail with an individual's medical providers.      It is usually not recommended that other relatives have genetic testing for the VUS unless it is done as part of the laboratory s variant research program because an individual s test results should not influence their cancer screening until we determine the importance of the VUS.  Your genetic counselor can help you and your relatives understand the risks and benefits of all genetic testing and cancer screening options.    Please call us if you have any questions or concerns.   Cancer Risk Management Program (Appointments: [phone])  Elver Crowe, [place] [facility] [place]  [phone]  Dominique Lance, MS, [facility] [place] [phone]  Amira Gutierres, MS, [facility] [place]  [phone]  Nicole Gipson, MS, [facility] [place]  [phone]  Serene Moreno, MS, [facility] [place]  [phone]  Sofiya Garcia, MS, [facility] [place] [phone]  Nica Apple MS,  [facility] [place] [phone]

## 2023-06-15 DIAGNOSIS — C18.2 MALIGNANT NEOPLASM OF ASCENDING COLON (H): Primary | ICD-10-CM

## 2023-06-15 DIAGNOSIS — R59.1 LYMPHADENOPATHY: ICD-10-CM

## 2023-09-15 ENCOUNTER — ANCILLARY PROCEDURE (OUTPATIENT)
Dept: CT IMAGING | Facility: CLINIC | Age: 53
End: 2023-09-15
Attending: INTERNAL MEDICINE
Payer: COMMERCIAL

## 2023-09-15 ENCOUNTER — LAB (OUTPATIENT)
Dept: LAB | Facility: CLINIC | Age: 53
End: 2023-09-15
Payer: COMMERCIAL

## 2023-09-15 DIAGNOSIS — C18.2 MALIGNANT NEOPLASM OF ASCENDING COLON (H): ICD-10-CM

## 2023-09-15 DIAGNOSIS — R59.1 LYMPHADENOPATHY: ICD-10-CM

## 2023-09-15 LAB — CEA SERPL-MCNC: 2.8 NG/ML

## 2023-09-15 PROCEDURE — 74177 CT ABD & PELVIS W/CONTRAST: CPT | Performed by: STUDENT IN AN ORGANIZED HEALTH CARE EDUCATION/TRAINING PROGRAM

## 2023-09-15 PROCEDURE — 36415 COLL VENOUS BLD VENIPUNCTURE: CPT

## 2023-09-15 PROCEDURE — 82378 CARCINOEMBRYONIC ANTIGEN: CPT

## 2023-09-15 RX ORDER — IOPAMIDOL 755 MG/ML
122 INJECTION, SOLUTION INTRAVASCULAR ONCE
Status: COMPLETED | OUTPATIENT
Start: 2023-09-15 | End: 2023-09-15

## 2023-09-15 RX ADMIN — IOPAMIDOL 122 ML: 755 INJECTION, SOLUTION INTRAVASCULAR at 12:19

## 2023-09-19 ENCOUNTER — VIRTUAL VISIT (OUTPATIENT)
Dept: ONCOLOGY | Facility: CLINIC | Age: 53
End: 2023-09-19
Payer: COMMERCIAL

## 2023-09-19 VITALS — BODY MASS INDEX: 32.48 KG/M2 | HEIGHT: 71 IN | WEIGHT: 232 LBS

## 2023-09-19 DIAGNOSIS — R59.1 LYMPHADENOPATHY: ICD-10-CM

## 2023-09-19 DIAGNOSIS — C18.2 MALIGNANT NEOPLASM OF ASCENDING COLON (H): Primary | ICD-10-CM

## 2023-09-19 PROCEDURE — 99214 OFFICE O/P EST MOD 30 MIN: CPT | Mod: VID | Performed by: INTERNAL MEDICINE

## 2023-09-19 NOTE — PROGRESS NOTES
Oncology follow-up visit:  Date on this visit: 9/19/23     Diagnosis.  colon adenocarcinoma    Primary Physician: Mary Cline     History Of Present Illness:  Mr. Briseno is a 53 year old  male who presents for follow-up of colon adenocarcinoma.  I initially saw him on 5/10/2023.  Please see my previous note for details.  I have copied and updated from prior notes.    I have reviewed notes from colorectal surgeon Dr. Mancini    He had a positive Cologuard test so underwent colonoscopy on 12/30/2022.  It showed 35 mm sessile polyp in the ascending colon, about 10 cm from the ileocecal valve.  This was not resected and biopsies showed tubulovillous adenoma.   Two sessile polyps were found in the transverse colon. The polyps were 7 to 9 mm in size. These polyps were removed with a cold snare.  Pathology showed tubular adenoma.    Two sessile polyps were found in the sigmoid colon. The polyps were 5 to 7 mm in size. These polyps were removed with a cold snare.  Pathology showed hyperplastic polyps    On 3/17/2023 he had endoscopic resection by Dr. Jaffe of the ascending colon polyp.  Pathology demonstrated focal adenocarcinoma arising in a tubular adenoma with high-grade dysplasia.  The focus of adenocarcinoma was <1mm and invaded into the submucosa approximately 0.5mm (SM1).  Margins were negative.  No evidence of lymphovascular invasion perineural invasion or tumor budding.  MMR is staining was attempted but adenocarcinoma was not identified.    3/25/2023.  CT chest abdomen and pelvis showed a tiny hypodensity in the subcapsular left hepatic lobe.  12 mm enlarged portacaval lymph node with several surrounding prominent lymph nodes.  1 cm right adrenal nodule was also seen.  No evidence of metastatic disease in the chest.    CT abdomen on 3/31/2023 showed 1 cm benign right adrenal adenoma.  11 mm prominent tamia hepatic lymph node was seen.    4/14/2023.  MRI abdomen with Eovist showed the right adrenal  gland nodule consistent with benign adenoma.  Subcentimeter short axis portacaval lymph node.  Benign simple cyst of the liver was seen.  No concerning liver lesions were seen.    He met with Dr. Mancini who discussed with him option of proceeding with a right hemicolectomy versus observation alone.  When he saw me on 5/10/2023, I had discussed the situation with him in detail.  He wanted to discuss with his wife before giving us final answer.   I had also recommended that he speaks with genetic counselor because of personal and family history of cancers to consider genetic testing.     After a thorough discussion about proceeding with a right hemicolectomy versus observation, he opted for observation alone.    Interval history.   This is a video visit.  He feels good.  He denies any abdominal pain nausea vomiting diarrhea constipation or bleeding.  No new swellings.  Energy is good.  No respiratory complaints.  He had some more questions so this appointment was made.  Overall he continues to feel well.      ECOG 0    ROS:  A ROS was otherwise neg    I reviewed other history in epic as below.      Past Medical/Surgical History:  Past Medical History:   Diagnosis Date    Hypertension      Past Surgical History:   Procedure Laterality Date    COLONOSCOPY N/A 12/30/2022    Procedure: COLONOSCOPY, WITH POLYPECTOMY AND BIOPSY;  Surgeon: Tyler Pandey MD;  Location: [facility]    COLONOSCOPY N/A 12/30/2022    Procedure: COLONOSCOPY, FLEXIBLE, WITH LESION REMOVAL USING SNARE;  Surgeon: Tyler Pandey MD;  Location: [facility]    COLONOSCOPY WITH CO2 INSUFFLATION N/A 12/30/2022    Procedure: COLONOSCOPY, WITH CO2 INSUFFLATION;  Surgeon: Tyler Pandey MD;  Location: [facility]    COLONOSCOPY, SUBMUCOSA RESECTION N/A 3/17/2023    Procedure: SUBMUCOSAL RESECTION;  Surgeon: Jos Jaffe MD;  Location: [facility]     Cancer History:   As above    Allergies:  Allergies as of 09/19/2023 - Reviewed  09/19/2023   Allergen Reaction Noted    Penicillins Hives 02/01/2021     Current Medications:  No current outpatient medications on file.      Family History:  Family History   Problem Relation Age of Onset    Hypertension Mother     Colon Cancer Mother     Cerebrovascular Disease Maternal Grandfather     Colon Cancer Paternal Grandfather    mother had colon cancer in her 60s.  Paternal grand father had rectal cancer  He has 3 kids- all healthy      Social History:  Social History     Socioeconomic History    Marital status:      Spouse name: Not on file    Number of children: Not on file    Years of education: Not on file    Highest education level: Not on file   Occupational History    Not on file   Tobacco Use    Smoking status: Every Day     Packs/day: 0.50     Years: 30.00     Pack years: 15.00     Types: Cigarettes    Smokeless tobacco: Never    Tobacco comments:     Have smoked foe 30yrs and tried to quit 3 times.   Vaping Use    Vaping Use: Never used   Substance and Sexual Activity    Alcohol use: Not Currently     Comment: not for 5 years     Drug use: Not Currently     Comment: usage was back in my 20's and 30's.    Sexual activity: Not Currently     Partners: Female     Birth control/protection: None   Other Topics Concern    Parent/sibling w/ CABG, MI or angioplasty before 65F 55M? No   Social History Narrative    Not on file     Social Determinants of Health     Financial Resource Strain: Not on file   Food Insecurity: Not on file   Transportation Needs: Not on file   Physical Activity: Not on file   Stress: Not on file   Social Connections: Not on file   Intimate Partner Violence: Not on file   Housing Stability: Not on file       Smokes about 1/2 ppd and used to take 1.5 to 2 ppd  Last etoh was in 2017  Lives with wife and 2 kids.  Works at [employer]                                                                                                                                                  "                                         Physical Exam:  Ht 1.803 m (5' 11\")   Wt 105.2 kg (232 lb)   BMI 32.36 kg/m      Wt Readings from Last 4 Encounters:   09/19/23 105.2 kg (232 lb)   05/10/23 110 kg (242 lb 9.6 oz)   03/17/23 112.9 kg (248 lb 14.4 oz)   03/01/23 112.9 kg (249 lb)         Constitutional.  Does not seem to be in any acute distress.  Eyes.  No redness or discharge noted.  Respiratory.  Speaking in full sentences.  Breathing seems comfortable without any accessory use of muscles.    Skin.  Visualized his skin does not show any obvious rashes.  Musculoskeletal.  Range of motion for visualized areas is intact.  Neurological.  Alert and oriented x3.  Psychiatric.  Mood, mentation and affect are normal.  Decision making capacity is intact.      The rest of a comprehensive physical examination is deferred due to [facility] Emergency video visit restrictions.        Laboratory/Imaging Studies      Reviewed    9/15/2023  CEA 2.8    5/10/2023   CBC unremarkable.  CEA 2.8.      CMP unremarkable on 5/12/2023     CT abdomen/pelvis on 9/15/2023  IMPRESSION: In this patient with history of malignant neoplasm of the  ascending colon, the current scan compared to prior CT 3/25/2023  shows:     1. No new metastatic disease in the abdomen or pelvis.  2. Similar appearing subcentimeter left lower hepatic hypodensity, no  new focal hepatic hypodensity.  3. Similar appearing prominent portacaval lymph node. No new  significant lymphadenopathy  4. Similar appearing right adrenal nodule.  5. Additional findings are unchanged, including prostatomegaly with  apparent thickening of the urinary bladder wall, representing  underdistention and/or post urinary outflow obstructive change    ASSESSMENT/PLAN:      Stage I colon adenocarcinoma which was found in the background of sessile tubulovillous adenoma with high-grade dysplasia which was removed endoscopically on 3/17/2023.  No lymphovascular or perineural invasion or " tumor budding.  All margins were negative.  Unable to determine MMR status.    There is slightly prominent tamia hepatis lymph nodes which is indeterminate.  No other evidence of metastatic disease.    He has benign hepatic cyst and right adrenal adenoma.    I had discussed with him that his chances of cure from current resection are high, although there is a small risk that cancer could have spread to pericolonic lymph nodes which have not been resected.  Our options include going ahead with a right hemicolectomy versus observation.      We had discussed pros and cons of each approach.  After a thorough discussion, he opted for watchful waiting.      He is doing well.  Currently no evidence of recurrence.    I would like to repeat CT abdomen/pelvis in 6 months to follow-up on the mildly enlarged/prominent periportal lymphadenopathy.  We will repeat CEA at that time.  He should have a surveillance colonoscopy in 6 months.  He will get this arranged to Dr. Mancini's office.      Genetic testing.  It did not reveal any high risk mutations.  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 This variant is called c.712C>T (p.[id number])       We did not address the following today    Discussion regarding smoking.  Strongly encouraged him to quit smoking.  Congratulated him on his efforts in cutting down smoking.  I offered him referral to smoking cessation program but at this time he is not interested.    Return to clinic in 6 months    All of his questions were answered to his satisfaction.  He is agreeable and comfortable with the plan.    Mark Leslie MD

## 2023-09-19 NOTE — PROGRESS NOTES
Virtual Visit Details    Type of service:  Video Visit     Originating Location (pt. Location): Home    Distant Location (provider location):  Off-site  Platform used for Video Visit: TapSurge  Video start time. 7:56 AM  Video stop time. 8:01 AM

## 2023-09-19 NOTE — NURSING NOTE
Is the patient currently in the state of [place]? YES    Visit mode:VIDEO    If the visit is dropped, the patient can be reconnected by: TELEPHONE VISIT: Phone number: [phone]    Will anyone else be joining the visit? NO  (If patient encounters technical issues they should call [phone]    How would you like to obtain your AVS? MyChart    Are changes needed to the allergy or medication list? No    Reason for visit: RECHECK (Video visit )    Chelly RIVERA

## 2023-09-19 NOTE — PATIENT INSTRUCTIONS
Please schedule colonoscopy with Dr Mancini in March 2024  Labs and CT in March 2024    See me a week after all of the above is done

## 2023-09-19 NOTE — LETTER
9/19/2023         RE: Ryan Briseno  [address]  [facility] [address]        Dear Colleague,    Thank you for referring your patient, Ryan Briseno, to the [facility]. Please see a copy of my visit note below.    Virtual Visit Details    Type of service:  Video Visit     Originating Location (pt. Location): Home    Distant Location (provider location):  Off-site  Platform used for Video Visit: Think Upgrade  Video start time. 7:56 AM  Video stop time. 8:01 AM         Oncology follow-up visit:  Date on this visit: 9/19/23     Diagnosis.  colon adenocarcinoma    Primary Physician: Mary Cline     History Of Present Illness:  Mr. Briseno is a 53 year old  male who presents for follow-up of colon adenocarcinoma.  I initially saw him on 5/10/2023.  Please see my previous note for details.  I have copied and updated from prior notes.    I have reviewed notes from colorectal surgeon Dr. Mancini    He had a positive Cologuard test so underwent colonoscopy on 12/30/2022.  It showed 35 mm sessile polyp in the ascending colon, about 10 cm from the ileocecal valve.  This was not resected and biopsies showed tubulovillous adenoma.   Two sessile polyps were found in the transverse colon. The polyps were 7 to 9 mm in size. These polyps were removed with a cold snare.  Pathology showed tubular adenoma.    Two sessile polyps were found in the sigmoid colon. The polyps were 5 to 7 mm in size. These polyps were removed with a cold snare.  Pathology showed hyperplastic polyps    On 3/17/2023 he had endoscopic resection by Dr. Jaffe of the ascending colon polyp.  Pathology demonstrated focal adenocarcinoma arising in a tubular adenoma with high-grade dysplasia.  The focus of adenocarcinoma was <1mm and invaded into the submucosa approximately 0.5mm (SM1).  Margins were negative.  No evidence of lymphovascular invasion perineural invasion or tumor budding.  MMR is staining was attempted but  adenocarcinoma was not identified.    3/25/2023.  CT chest abdomen and pelvis showed a tiny hypodensity in the subcapsular left hepatic lobe.  12 mm enlarged portacaval lymph node with several surrounding prominent lymph nodes.  1 cm right adrenal nodule was also seen.  No evidence of metastatic disease in the chest.    CT abdomen on 3/31/2023 showed 1 cm benign right adrenal adenoma.  11 mm prominent tamia hepatic lymph node was seen.    4/14/2023.  MRI abdomen with Eovist showed the right adrenal gland nodule consistent with benign adenoma.  Subcentimeter short axis portacaval lymph node.  Benign simple cyst of the liver was seen.  No concerning liver lesions were seen.    He met with Dr. Mancini who discussed with him option of proceeding with a right hemicolectomy versus observation alone.  When he saw me on 5/10/2023, I had discussed the situation with him in detail.  He wanted to discuss with his wife before giving us final answer.   I had also recommended that he speaks with genetic counselor because of personal and family history of cancers to consider genetic testing.     After a thorough discussion about proceeding with a right hemicolectomy versus observation, he opted for observation alone.    Interval history.   This is a video visit.  He feels good.  He denies any abdominal pain nausea vomiting diarrhea constipation or bleeding.  No new swellings.  Energy is good.  No respiratory complaints.  He had some more questions so this appointment was made.  Overall he continues to feel well.      ECOG 0    ROS:  A ROS was otherwise neg    I reviewed other history in epic as below.      Past Medical/Surgical History:  Past Medical History:   Diagnosis Date     Hypertension      Past Surgical History:   Procedure Laterality Date     COLONOSCOPY N/A 12/30/2022    Procedure: COLONOSCOPY, WITH POLYPECTOMY AND BIOPSY;  Surgeon: Tyler Pandey MD;  Location: [facility]     COLONOSCOPY N/A 12/30/2022     Procedure: COLONOSCOPY, FLEXIBLE, WITH LESION REMOVAL USING SNARE;  Surgeon: Tyler Pandey MD;  Location: [facility]     COLONOSCOPY WITH CO2 INSUFFLATION N/A 12/30/2022    Procedure: COLONOSCOPY, WITH CO2 INSUFFLATION;  Surgeon: Tyler Pandey MD;  Location: [facility]     COLONOSCOPY, SUBMUCOSA RESECTION N/A 3/17/2023    Procedure: SUBMUCOSAL RESECTION;  Surgeon: Jos Jaffe MD;  Location: [facility]     Cancer History:   As above    Allergies:  Allergies as of 09/19/2023 - Reviewed 09/19/2023   Allergen Reaction Noted     Penicillins Hives 02/01/2021     Current Medications:  No current outpatient medications on file.      Family History:  Family History   Problem Relation Age of Onset     Hypertension Mother      Colon Cancer Mother      Cerebrovascular Disease Maternal Grandfather      Colon Cancer Paternal Grandfather    mother had colon cancer in her 60s.  Paternal grand father had rectal cancer  He has 3 kids- all healthy      Social History:  Social History     Socioeconomic History     Marital status:      Spouse name: Not on file     Number of children: Not on file     Years of education: Not on file     Highest education level: Not on file   Occupational History     Not on file   Tobacco Use     Smoking status: Every Day     Packs/day: 0.50     Years: 30.00     Pack years: 15.00     Types: Cigarettes     Smokeless tobacco: Never     Tobacco comments:     Have smoked foe 30yrs and tried to quit 3 times.   Vaping Use     Vaping Use: Never used   Substance and Sexual Activity     Alcohol use: Not Currently     Comment: not for 5 years      Drug use: Not Currently     Comment: usage was back in my 20's and 30's.     Sexual activity: Not Currently     Partners: Female     Birth control/protection: None   Other Topics Concern     Parent/sibling w/ CABG, MI or angioplasty before 65F 55M? No   Social History Narrative     Not on file     Social Determinants of Health     Financial Resource  "Strain: Not on file   Food Insecurity: Not on file   Transportation Needs: Not on file   Physical Activity: Not on file   Stress: Not on file   Social Connections: Not on file   Intimate Partner Violence: Not on file   Housing Stability: Not on file       Smokes about 1/2 ppd and used to take 1.5 to 2 ppd  Last etoh was in 2017  Lives with wife and 2 kids.  Works at [employer]                                                                                                                                                                                          Physical Exam:  Ht 1.803 m (5' 11\")   Wt 105.2 kg (232 lb)   BMI 32.36 kg/m      Wt Readings from Last 4 Encounters:   09/19/23 105.2 kg (232 lb)   05/10/23 110 kg (242 lb 9.6 oz)   03/17/23 112.9 kg (248 lb 14.4 oz)   03/01/23 112.9 kg (249 lb)         Constitutional.  Does not seem to be in any acute distress.  Eyes.  No redness or discharge noted.  Respiratory.  Speaking in full sentences.  Breathing seems comfortable without any accessory use of muscles.    Skin.  Visualized his skin does not show any obvious rashes.  Musculoskeletal.  Range of motion for visualized areas is intact.  Neurological.  Alert and oriented x3.  Psychiatric.  Mood, mentation and affect are normal.  Decision making capacity is intact.      The rest of a comprehensive physical examination is deferred due to [facility] video visit restrictions.        Laboratory/Imaging Studies      Reviewed    9/15/2023  CEA 2.8    5/10/2023   CBC unremarkable.  CEA 2.8.      CMP unremarkable on 5/12/2023     CT abdomen/pelvis on 9/15/2023  IMPRESSION: In this patient with history of malignant neoplasm of the  ascending colon, the current scan compared to prior CT 3/25/2023  shows:     1. No new metastatic disease in the abdomen or pelvis.  2. Similar appearing subcentimeter left lower hepatic hypodensity, no  new focal hepatic hypodensity.  3. Similar appearing prominent portacaval " lymph node. No new  significant lymphadenopathy  4. Similar appearing right adrenal nodule.  5. Additional findings are unchanged, including prostatomegaly with  apparent thickening of the urinary bladder wall, representing  underdistention and/or post urinary outflow obstructive change    ASSESSMENT/PLAN:      Stage I colon adenocarcinoma which was found in the background of sessile tubulovillous adenoma with high-grade dysplasia which was removed endoscopically on 3/17/2023.  No lymphovascular or perineural invasion or tumor budding.  All margins were negative.  Unable to determine MMR status.    There is slightly prominent tamia hepatis lymph nodes which is indeterminate.  No other evidence of metastatic disease.    He has benign hepatic cyst and right adrenal adenoma.    I had discussed with him that his chances of cure from current resection are high, although there is a small risk that cancer could have spread to pericolonic lymph nodes which have not been resected.  Our options include going ahead with a right hemicolectomy versus observation.      We had discussed pros and cons of each approach.  After a thorough discussion, he opted for watchful waiting.      He is doing well.  Currently no evidence of recurrence.    I would like to repeat CT abdomen/pelvis in 6 months to follow-up on the mildly enlarged/prominent periportal lymphadenopathy.  We will repeat CEA at that time.  He should have a surveillance colonoscopy in 6 months.  He will get this arranged to Dr. Mancini's office.      Genetic testing.  It did not reveal any high risk mutations.  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 This variant is called c.712C>T ([id number])       We did not address the following today    Discussion regarding smoking.  Strongly encouraged him to quit smoking.  Congratulated him on his efforts in cutting down smoking.  I offered him referral to smoking cessation program but  at this time he is not interested.    Return to clinic in 6 months    All of his questions were answered to his satisfaction.  He is agreeable and comfortable with the plan.    Mark Leslie MD       Again, thank you for allowing me to participate in the care of your patient.        Sincerely,        Mark Leslie MD

## 2023-09-29 ENCOUNTER — OFFICE VISIT (OUTPATIENT)
Dept: FAMILY MEDICINE | Facility: CLINIC | Age: 53
End: 2023-09-29
Payer: COMMERCIAL

## 2023-09-29 ENCOUNTER — LAB (OUTPATIENT)
Dept: LAB | Facility: CLINIC | Age: 53
End: 2023-09-29
Payer: COMMERCIAL

## 2023-09-29 ENCOUNTER — TELEPHONE (OUTPATIENT)
Dept: FAMILY MEDICINE | Facility: CLINIC | Age: 53
End: 2023-09-29

## 2023-09-29 VITALS
HEART RATE: 80 BPM | DIASTOLIC BLOOD PRESSURE: 84 MMHG | RESPIRATION RATE: 19 BRPM | SYSTOLIC BLOOD PRESSURE: 128 MMHG | OXYGEN SATURATION: 97 % | BODY MASS INDEX: 33.47 KG/M2 | HEIGHT: 70 IN | WEIGHT: 233.8 LBS | TEMPERATURE: 99.4 F

## 2023-09-29 DIAGNOSIS — Z12.11 COLON CANCER SCREENING: ICD-10-CM

## 2023-09-29 DIAGNOSIS — C18.9 MALIGNANT NEOPLASM OF COLON, UNSPECIFIED PART OF COLON (H): ICD-10-CM

## 2023-09-29 DIAGNOSIS — R03.0 ELEVATED BP WITHOUT DIAGNOSIS OF HYPERTENSION: ICD-10-CM

## 2023-09-29 DIAGNOSIS — R03.0 ELEVATED BP WITHOUT DIAGNOSIS OF HYPERTENSION: Primary | ICD-10-CM

## 2023-09-29 LAB
ANION GAP SERPL CALCULATED.3IONS-SCNC: 11 MMOL/L (ref 7–15)
BUN SERPL-MCNC: 15.4 MG/DL (ref 6–20)
CALCIUM SERPL-MCNC: 9.3 MG/DL (ref 8.6–10)
CHLORIDE SERPL-SCNC: 105 MMOL/L (ref 98–107)
CREAT SERPL-MCNC: 1.02 MG/DL (ref 0.67–1.17)
DEPRECATED HCO3 PLAS-SCNC: 25 MMOL/L (ref 22–29)
EGFRCR SERPLBLD CKD-EPI 2021: 88 ML/MIN/1.73M2
GLUCOSE SERPL-MCNC: 92 MG/DL (ref 70–99)
POTASSIUM SERPL-SCNC: 4.4 MMOL/L (ref 3.4–5.3)
SODIUM SERPL-SCNC: 141 MMOL/L (ref 135–145)

## 2023-09-29 PROCEDURE — 36415 COLL VENOUS BLD VENIPUNCTURE: CPT

## 2023-09-29 PROCEDURE — 80048 BASIC METABOLIC PNL TOTAL CA: CPT

## 2023-09-29 PROCEDURE — 99214 OFFICE O/P EST MOD 30 MIN: CPT | Performed by: FAMILY MEDICINE

## 2023-09-29 ASSESSMENT — PAIN SCALES - GENERAL: PAINLEVEL: NO PAIN (0)

## 2023-09-29 NOTE — PROGRESS NOTES
"  Assessment & Plan     Elevated BP without diagnosis of hypertension  Home blood pressure readings have been well controlled.  Always a bit elevated here and at the dentist.  So I wrote a note for him stating that this is likely whitecoat hypertension and we will continue to follow.    Malignant neoplasm of colon, unspecified part of colon (H)  Reviewed interval history.  Now on a monitoring program with plans to recheck CT, CEA, and colonoscopy in March.    Colon cancer screening  As above  - Colonoscopy Screening  Referral; Future    From a routine health standpoint we talked about his tobacco use.  There was some atherosclerosis seen in his aorta on recent CT scan though no dilation.  So not something we necessarily have to follow at any particular timeframe, but we want to keep blood pressure under control and stop smoking.  He is working on better lifestyle overall.  So we will plan a physical in the spring.       Nicotine/Tobacco Cessation:  He reports that he has been smoking cigarettes. He has a 15.00 pack-year smoking history. He has never used smokeless tobacco.  Nicotine/Tobacco Cessation Plan:   Self help information given to patient      BMI:   Estimated body mass index is 33.1 kg/m  as calculated from the following:    Height as of this encounter: 1.79 m (5' 10.47\").    Weight as of this encounter: 106.1 kg (233 lb 12.8 oz).   Weight management plan: Discussed healthy diet and exercise guidelines    See Patient Instructions    Mary Cline MD  [facility]    Dimitri Davis is a 53 year old, presenting for the following health issues:  Results        9/29/2023     3:06 PM   Additional Questions   Roomed by Crystal   Accompanied by self         9/29/2023     3:06 PM   Patient Reported Additional Medications   Patient reports taking the following new medications none       History of Present Illness       Reason for visit:  Follow up from a procedure    He " "eats 2-3 servings of fruits and vegetables daily.He consumes 0 sweetened beverage(s) daily.He exercises with enough effort to increase his heart rate 10 to 19 minutes per day.  He exercises with enough effort to increase his heart rate 5 days per week.   He is taking medications regularly.         Want to follow up on CT results.    Here today in follow-up of interval history as above.      Review of Systems   Constitutional, HEENT, cardiovascular, pulmonary, gi and gu systems are negative, except as otherwise noted.      Objective    /84   Pulse 80   Temp 99.4  F (37.4  C) (Oral)   Resp 19   Ht 1.79 m (5' 10.47\")   Wt 106.1 kg (233 lb 12.8 oz)   SpO2 97%   BMI 33.10 kg/m    Body mass index is 33.1 kg/m .  Physical Exam   Alert, pleasant, upbeat, and in no apparent discomfort.  S1 and S2 normal, no murmurs, clicks, gallops or rubs. Regular rate and rhythm. Chest is clear; no wheezes or rales. No edema or JVD.  Past labs reviewed with the patient.                       "

## 2023-09-29 NOTE — TELEPHONE ENCOUNTER
Patient calling to see which lab orders are placed for his lab appointment today 9/29/2023. RN notes PCP ordered BMP. Patient inquiring if this is a fasting lab. RN advised it is recommended to fast 10-12 for this lab test. Patient verbalized understanding and stated that around 0500 he had 2 cups of coffee with sugar in it. He will fast the rest of the day until his appointment later this afternoon. No further questions or concerns.    KATELYNN Ungre  [facility] Primary Care Triage

## 2023-09-29 NOTE — LETTER
September 29, 2023      Ryan Briseno  [address]  [facility] [address]        I am the primary physician for Mr. Ryan Briseno.  He suffers from whitecoat hypertension but monitoring home readings are within the normal range.  No restrictions placed upon dental or other procedures.          Sincerely,        Mary Cline MD

## 2023-09-29 NOTE — TELEPHONE ENCOUNTER
Patient calling about lab appointment he has today. He wanted to ensure that his BMP labs today did not require fasting labs. Stated he had cup of coffee and sugar at 5 this morning. Writer verified with provider that patient does not need to fast for his BMP. Notified that BMP labs do not require fasting. Patient verbalized understanding and had no further questions or concerns.    Reed Verde RN

## 2023-10-17 ENCOUNTER — EXTERNAL ORDER RESULTS (OUTPATIENT)
Dept: LAB | Facility: CLINIC | Age: 53
End: 2023-10-17

## 2023-10-17 ENCOUNTER — OFFICE VISIT (OUTPATIENT)
Dept: FAMILY MEDICINE | Facility: CLINIC | Age: 53
End: 2023-10-17
Payer: COMMERCIAL

## 2023-10-17 ENCOUNTER — NURSE TRIAGE (OUTPATIENT)
Dept: FAMILY MEDICINE | Facility: CLINIC | Age: 53
End: 2023-10-17

## 2023-10-17 VITALS
WEIGHT: 232.38 LBS | HEART RATE: 72 BPM | BODY MASS INDEX: 32.53 KG/M2 | HEIGHT: 71 IN | OXYGEN SATURATION: 97 % | TEMPERATURE: 98.4 F | DIASTOLIC BLOOD PRESSURE: 91 MMHG | RESPIRATION RATE: 20 BRPM | SYSTOLIC BLOOD PRESSURE: 136 MMHG

## 2023-10-17 DIAGNOSIS — H81.23 VESTIBULAR NEURONITIS OF BOTH EARS: Primary | ICD-10-CM

## 2023-10-17 PROCEDURE — 99213 OFFICE O/P EST LOW 20 MIN: CPT | Performed by: INTERNAL MEDICINE

## 2023-10-17 ASSESSMENT — PAIN SCALES - GENERAL: PAINLEVEL: NO PAIN (0)

## 2023-10-17 NOTE — TELEPHONE ENCOUNTER
"Patient calling in to reports dizziness today and yesterday. Patient believes he is experiencing vertigo. Patient states he called to [facility] to get an appointment but soonest appointment is 2/2024 and thought he would start with PCP about his sx.     Patient c/o dizziness caused severe nausea yesterday but denies nausea today. Patient states when he tilts or turns his head, the room is not spinning in circles but feels the room is spinning back and forth. Patient states dizziness has been constant since yesterday and still present during call. Patient states he is able to walk normally but \"may miss a step or bump into a wall.\" Patient denies other sx at this time.      Advise patient to be seen today in office. Advise PCP is unavailable and offered appointment with Dr. Sadler at 5:30 pm. Patient agreed and advise of arrive time of 5:10 pm, patient verbalized understanding.     KATLEYNN Tristan  [facility]      Reason for Disposition   Spinning or tilting sensation (vertigo) present now    Additional Information   Negative: SEVERE difficulty breathing (e.g., struggling for each breath, speaks in single words)   Negative: Shock suspected (e.g., cold/pale/clammy skin, too weak to stand, low BP, rapid pulse)   Negative: Difficult to awaken or acting confused (e.g., disoriented, slurred speech)   Negative: Fainted, and still feels dizzy afterwards   Negative: Overdose (accidental or intentional) of medications   Negative: New neurologic deficit that is present now: * Weakness of the face, arm, or leg on one side of the body * Numbness of the face, arm, or leg on one side of the body * Loss of speech or garbled speech   Negative: Heart beating < 50 beats per minute OR > 140 beats per minute   Negative: Sounds like a life-threatening emergency to the triager   Negative: Chest pain   Negative: Rectal bleeding, bloody stool, or tarry-black stool   Negative: Vomiting is main symptom   Negative: Diarrhea " "is main symptom   Negative: Headache is main symptom   Negative: Heat exhaustion suspected (i.e., dehydration from heat exposure)   Negative: Patient states that they are having an anxiety or panic attack   Negative: Dizziness from low blood sugar (i.e., < 60 mg/dl or 3.5 mmol/l)   Negative: SEVERE dizziness (e.g., unable to stand, requires support to walk, feels like passing out now)   Negative: SEVERE headache or neck pain   Negative: Spinning or tilting sensation (vertigo) present now and one or more stroke risk factors (i.e., hypertension, diabetes mellitus, prior stroke/TIA, heart attack, age over 60) (Exception: Prior physician evaluation for this AND no different/worse than usual.)   Negative: Neurologic deficit that was brief (now gone), ANY of the following:* Weakness of the face, arm, or leg on one side of the body* Numbness of the face, arm, or leg on one side of the body* Loss of speech or garbled speech   Negative: Loss of vision or double vision  (Exception: Similar to previous migraines.)   Negative: Extra heartbeats, irregular heart beating, or heart is beating very fast (i.e., 'palpitations')   Negative: Difficulty breathing   Negative: Drinking very little and dehydration suspected (e.g., no urine > 12 hours, very dry mouth, very lightheaded)   Negative: Follows bleeding (e.g., stomach, rectum, vagina)  (Exception: Became dizzy from sight of small amount blood.)   Negative: Patient sounds very sick or weak to the triager    Answer Assessment - Initial Assessment Questions  1. DESCRIPTION: \"Describe your dizziness.\"      Patient states if he tilts or turn head, he has an \"uneasy spinning back and forth\" dizziness.    2. LIGHTHEADED: \"Do you feel lightheaded?\" (e.g., somewhat faint, woozy, weak upon standing)      No but states he feels pressure in temples and eyeballs.   3. VERTIGO: \"Do you feel like either you or the room is spinning or tilting?\" (i.e. vertigo)     He feels the room is tilting, " "feels his \"sense of balance is off.\"   4. SEVERITY: \"How bad is it?\"  \"Do you feel like you are going to faint?\" \"Can you stand and walk?\"    - MILD: Feels slightly dizzy, but walking normally.    - MODERATE: Feels unsteady when walking, but not falling; interferes with normal activities (e.g., school, work).    - SEVERE: Unable to walk without falling, or requires assistance to walk without falling; feels like passing out now.      Patient rates the severity is mild and can walk normally but reports will \"miss some steps or bump into a wall.\"  5. ONSET:  \"When did the dizziness begin?\"      Patient states started yesterday morning after getting out of bed and fell into the wall which was close to the bed.   6. AGGRAVATING FACTORS: \"Does anything make it worse?\" (e.g., standing, change in head position)     Turning or tilting head  7. HEART RATE: \"Can you tell me your heart rate?\" \"How many beats in 15 seconds?\"  (Note: not all patients can do this)        States he normally check BP daily but last check was Sunday and HR at that time was either 72 or 82, can't remember.     8. CAUSE: \"What do you think is causing the dizziness?\"      Vertigo  9. RECURRENT SYMPTOM: \"Have you had dizziness before?\" If Yes, ask: \"When was the last time?\" \"What happened that time?\"      Patient states he's never had this type of dizziness before although in his 40's he did have a few days of dizziness but dizziness went away fairly quick after he felt it.   10. OTHER SYMPTOMS: \"Do you have any other symptoms?\" (e.g., fever, chest pain, vomiting, diarrhea, bleeding)        Patient states dizziness caused him to be very nauseous yesterday but denies nausea today only dizziness still present.   11. PREGNANCY: \"Is there any chance you are pregnant?\" \"When was your last menstrual period?\"        N/a    Protocols used: Dizziness-A-OH    "

## 2023-10-17 NOTE — PROGRESS NOTES
Assessment & Plan     1.  Vestibular neuritis of both ears.  Symptoms not send suggestive of benign positional vertigo or Ménière's type disease.  Not central dizziness.  Most consistent with vestibular neuronitis.  Advised can use over-the-counter meclizine but no treatment needed.  Patient reassured that the symptoms will gradually get better with time.  Informed this is felt to be most likely related to viral infection related inflammation  Other vestibular organ.      Brian Sadler MD  [facility] KRISHNA Davis is a 53 year old, presenting for the following health issues:  Dizziness (Vertigo   started 10/16)      10/17/2023     5:06 PM   Additional Questions   Roomed by Diane Lynne Schoenherr RN       History of Present Illness       Reason for visit:  Virtigo  Symptom onset:  1-3 days ago  Symptoms include:  Dizziness  Symptom intensity:  Moderate  Symptom progression:  Staying the same  Had these symptoms before:  No  What makes it worse:  Any head movement  What makes it better:  Not moving my head    He eats 0-1 servings of fruits and vegetables daily.He consumes 0 sweetened beverage(s) daily.He exercises with enough effort to increase his heart rate 10 to 19 minutes per day.  He exercises with enough effort to increase his heart rate 5 days per week.   He is taking medications regularly.         Acute Illness  Acute illness concerns: vertigo dizziness  Onset/Duration: 10/16  Symptoms:  Fever: No  Chills/Sweats: No  Headache (location?): YES  sides sinus  Sinus Pressure: YES  Conjunctivitis:  No  Ear Pain: no  Rhinorrhea: No  Congestion: No  Sore Throat: No  Cough: no  Wheeze: No  Decreased Appetite: No  Nausea: YES  yesterday  Vomiting: No  Diarrhea: No  Dysuria/Freq.: No  Dysuria or Hematuria: No  Fatigue/Achiness: No  Sick/Strep Exposure: No  Therapies tried and outcome: Claritin D 12 hour relief with some relief    59-year-old gentleman presents with a vertiginous type  "dizziness with the onset yesterday morning when he first woke up.  The room was spinning and he also had associated nausea.  Eventually as a day progressed the symptoms got better.  He noticed that if he turned his head quickly left or right he will get the symptoms but more so on the left side.  No hearing loss or ringing in the ears.  Today morning when he woke up he had a similar symptom again.  Gradually as a day progressed he got better.  As long as he does not move his head seems to be okay.  No fever or chills.  No head congestion.  No postnasal drip.  No headaches.  On Sunday he was blowing leaves in his yard and did have a headache.    Review of Systems   Constitutional, HEENT, cardiovascular, pulmonary, gi and gu systems are negative, except as otherwise noted.      Objective    BP (!) 151/98 (BP Location: Right arm, Patient Position: Sitting, Cuff Size: Adult Large)   Pulse 72   Temp 98.4  F (36.9  C) (Oral)   Resp 20   Ht 1.803 m (5' 11\")   Wt 105.4 kg (232 lb 6 oz)   SpO2 97%   BMI 32.41 kg/m    Body mass index is 32.41 kg/m .  Physical Exam   GENERAL: healthy, alert and no distress  HENT: ear canals and TM's normal, nose and mouth without ulcers or lesions  NECK: no adenopathy, no asymmetry, masses, or scars and thyroid normal to palpation  RESP: lungs clear to auscultation - no rales, rhonchi or wheezes  CV: regular rate and rhythm, normal S1 S2, no S3 or S4, no murmur, click or rub, no peripheral edema and peripheral pulses strong                  "

## 2023-10-20 ENCOUNTER — TELEPHONE (OUTPATIENT)
Dept: FAMILY MEDICINE | Facility: CLINIC | Age: 53
End: 2023-10-20
Payer: COMMERCIAL

## 2023-10-20 ENCOUNTER — TELEPHONE (OUTPATIENT)
Dept: GASTROENTEROLOGY | Facility: CLINIC | Age: 53
End: 2023-10-20
Payer: COMMERCIAL

## 2023-10-20 ENCOUNTER — HOSPITAL ENCOUNTER (OUTPATIENT)
Facility: AMBULATORY SURGERY CENTER | Age: 53
Discharge: HOME | End: 2023-10-20
Attending: INTERNAL MEDICINE
Payer: COMMERCIAL

## 2023-10-20 DIAGNOSIS — Z12.11 COLON CANCER SCREENING: Primary | ICD-10-CM

## 2023-10-20 NOTE — TELEPHONE ENCOUNTER
Patient calling to ask if he will be able to have golytely prep for his colonoscopy in March. Also asking if he can have propofol for sedation. Currently the patient is scheduled with moderate sedation per order.     Advised the golytely can be ordered at the time of the pre assessment phone call, and as for the sedation level, will send for review to PCP (ordering provider).    Patient verbalized understanding and agreeable to plan.    Liza Simmons RN

## 2023-10-20 NOTE — TELEPHONE ENCOUNTER
"Patient calling in with a few concerns re: upcoming colonoscopy in March 2024.    1) Patient asking if he'll be under \"full anesthesia\" for the colonoscopy. Patient states that his last few colonoscopies he has been \"completely under\" and wants to be sure this upcoming one is ordered that way. Patient states that he did not have a breathing tube inserted during his last colonoscopies but doesn't remember having them done. Currently patient is ordered to have moderate/conscious sedation, which sounds like what patient had, but patient wanted to be sure it was ordered \"correctly\"    2) Patient asking if he can get Go-Lytely prep instead of the miralax mixture. Informed patient that the kind of prep comes from his GI provider's office - would recommend contacting their office to see if he can get this vs the miralax prep. Patient verbalized understanding.      Routing to provider to review/advise re: sedation      TANIA BrunoN, RN  [facility]  "

## 2023-10-20 NOTE — TELEPHONE ENCOUNTER
Patient called and is requesting [facility] prep for his procedure in 2024.     Guerita Beth RN

## 2023-10-20 NOTE — TELEPHONE ENCOUNTER
"Endoscopy Scheduling Screen    Have you had a positive Covid test in the last 14 days?  No    Are you active on MyChart?   Yes    What insurance is in the chart?  Other:  [employer]    Ordering/Referring Provider: AFSHIN TORRES   (If ordering provider performs procedure, schedule with ordering provider unless otherwise instructed. )    BMI: Estimated body mass index is 32.41 kg/m  as calculated from the following:    Height as of 10/17/23: 1.803 m (5' 11\").    Weight as of 10/17/23: 105.4 kg (232 lb 6 oz).     Sedation Ordered  moderate sedation.   If patient BMI > 50 do not schedule in ASC.    If patient BMI > 45 do not schedule at [facility].    Are you taking methadone or Suboxone?  No    Are you taking any prescription medications for pain 3 or more times per week?   No    Do you have a history of malignant hyperthermia or adverse reaction to anesthesia?  No    (Females) Are you currently pregnant?   No     Have you been diagnosed or told you have pulmonary hypertension?   No    Do you have an LVAD?  No    Have you been told you have moderate to severe sleep apnea?  No    Have you been told you have COPD, asthma, or any other lung disease?  No    Do you have any heart conditions?  No     Have you ever had an organ transplant?   No    Have you ever had or are you awaiting a heart or lung transplant?   No    Have you had a stroke or transient ischemic attack (TIA aka \"mini stroke\" in the last 6 months?   No    Have you been diagnosed with or been told you have cirrhosis of the liver?   No    Are you currently on dialysis?   No    Do you need assistance transferring?   No    BMI: Estimated body mass index is 32.41 kg/m  as calculated from the following:    Height as of 10/17/23: 1.803 m (5' 11\").    Weight as of 10/17/23: 105.4 kg (232 lb 6 oz).     Is patients BMI > 40 and scheduling location [facility]?  No    Do you take an injectable medication for weight loss or diabetes (excluding insulin)?  No    Do you take the medication " Naltrexone?  No    Do you take blood thinners?  No       Prep   Are you currently on dialysis or do you have chronic kidney disease?  No    Do you have a diagnosis of diabetes?  No    Do you have a diagnosis of cystic fibrosis (CF)?  No    On a regular basis do you go 3 -5 days between bowel movements?  No    BMI > 40?  No    Preferred Pharmacy:    [facility]/pharmacy #[id number] - [facility] [address] [place] [facility]  [address] [facility] [address]  Phone: [phone] Fax: [phone]      Final Scheduling Details   Colonoscopy prep sent?  Standard MiraLAX    Procedure scheduled  Colonoscopy    Surgeon:  ROMEL PLUNKETT     Date of procedure:  3/7/24     Pre-OP / PAC:   No - Not required for this site.    Location  [facility] - [facility] - Per order.    Sedation   Moderate Sedation - Per order.      Patient Reminders:   You will receive a call from a Nurse to review instructions and health history.  This assessment must be completed prior to your procedure.  Failure to complete the Nurse assessment may result in the procedure being cancelled.      On the day of your procedure, please designate an adult(s) who can drive you home stay with you for the next 24 hours. The medicines used in the exam will make you sleepy. You will not be able to drive.      You cannot take public transportation, ride share services, or non-medical taxi service without a responsible caregiver.  Medical transport services are allowed with the requirement that a responsible caregiver will receive you at your destination.  We require that drivers and caregivers are confirmed prior to your procedure.

## 2023-10-23 ENCOUNTER — TELEPHONE (OUTPATIENT)
Dept: FAMILY MEDICINE | Facility: CLINIC | Age: 53
End: 2023-10-23
Payer: COMMERCIAL

## 2023-10-23 DIAGNOSIS — R21 RASH: Primary | ICD-10-CM

## 2023-10-23 NOTE — TELEPHONE ENCOUNTER
Provider:  Patient forgot to mention a target like rash that looked like a bite on his right side when you had seen him 10/17/2023.  He is wondering if he should be tested for Lyme's disease due to his symptoms and this rash. Please advise.   Thank you. Abimbola Conley R.N.    Patient reports that he had vertigo last week and was seen by Dr. Sdaler.  He is better and the vertigo has stopped. He forgot to tell Dr. Sadler something that he feels would have been important for his visit with them. He reports that he got bite by something on his right side. It had swollen up and looked like a target. He first noticed it Monday 9/16/2023. Sunday he had a headache and then noticed the lesion on Monday. He did not find anything attached at that time.  If it was touched it was very painful. The area is red with a pinpoint now. Today he feels pretty good and the vertigo is gone. He reports a little pressure in his head.  He is wondering if he should be tested for Lyme's with this new information. The worst day was Monday and Wednesday and then the symptoms got better from there.    Ok to leave a message with the provider's response.

## 2023-10-23 NOTE — TELEPHONE ENCOUNTER
I can change to order to reflect different sedation.  The prep stuff goes through the colonoscopy folks - not us.  They will talk to him about it.

## 2023-10-23 NOTE — TELEPHONE ENCOUNTER
If a deer tick was not found attached to the skin, it is hard to know what could have bit him.  After a tick bite, it take 2 to 3 weeks for antibodies to develop and be able to detect in the blood test.  Inform patient I can order lyme's test, but if the deer tick bite is recent, test could be falsely negative.

## 2023-10-24 NOTE — TELEPHONE ENCOUNTER
Called patient and relayed provider message below. Patient verbalized understanding, see telephone encounter from 10/23 as well for further questions from patient regarding another issue.      TANIA BrunoN, RN  [facility]

## 2023-10-24 NOTE — TELEPHONE ENCOUNTER
Called patient and relayed provider's message below, patient verbalized understanding. Patient states that the bulls-eye is no longer present but it did up until 10/21/23. Now improving. Patient states he would still be interested in getting lymes disease blood test if able.      Routing to provider to review/advise re: lymes blood test      TANIA BrunoN, RN  [facility]

## 2023-10-25 ENCOUNTER — LAB (OUTPATIENT)
Dept: LAB | Facility: CLINIC | Age: 53
End: 2023-10-25
Payer: COMMERCIAL

## 2023-10-25 DIAGNOSIS — R21 RASH: ICD-10-CM

## 2023-10-25 LAB — B BURGDOR IGG+IGM SER QL: 0.08

## 2023-10-25 PROCEDURE — 86618 LYME DISEASE ANTIBODY: CPT

## 2023-10-25 PROCEDURE — 36415 COLL VENOUS BLD VENIPUNCTURE: CPT

## 2023-10-25 NOTE — TELEPHONE ENCOUNTER
RN called patient and LVM to call clinic at [phone].      If patient calls back, please relay provider message below.     Scarlett Omer RN

## 2023-10-25 NOTE — TELEPHONE ENCOUNTER
Patient called back. Notified him of provider's response below. Was able to help patient schedule a lab only appointment today for 3:30 PM at [facility]. Patient had no further questions/concerns at this time.     Scarlett Omer, TANIAN, RN   [facility]

## 2023-12-11 ENCOUNTER — TELEPHONE (OUTPATIENT)
Dept: ONCOLOGY | Facility: CLINIC | Age: 53
End: 2023-12-11
Payer: COMMERCIAL

## 2023-12-11 NOTE — TELEPHONE ENCOUNTER
Anuj, Mark HERNANDEZ MD  You; Yolanda Yuna RN6 minutes ago (10:41 AM)     AO  Can I see him 1230 tomorrow Tuesday as a video visit- not telephone visit??       Pt was notified with recommendations per Dr Leslie, and pt agrees with this plan.  Pt has been scheduled for a video visit tomorrow with Dr Leslie at 12:30.    Pt was advised that if he develops increasing abdominal pain, fever, vomiting, blood in stool, or other emergent symptoms that he should be seen in ER.     Patient verbalized understanding and agreement with plan.  Patient was instructed to call the clinic with any questions, concerns, or worsening symptoms.    Emerald Resendiz RN on 12/11/2023 at 11:06 AM

## 2023-12-11 NOTE — TELEPHONE ENCOUNTER
Per Dr Leslie's notes:  Pt has been following with Dr Leslie for Stage I colon adenocarcinoma which was found in the background of sessile tubulovillous adenoma with high-grade dysplasia which was removed endoscopically on 3/17/2023:  On 3/17/2023 he had endoscopic resection by Dr. Jaffe of the ascending colon polyp.  Pathology demonstrated focal adenocarcinoma arising in a tubular adenoma with high-grade dysplasia.  The focus of adenocarcinoma was <1mm and invaded into the submucosa approximately 0.5mm (SM1).  Margins were negative.   Pt decided against right hemicolectomy and opted for observation.      Pt reports that for the past 2-3 days he has noticed pain in his abdomen. Pt states that the pain is located exactly where the cancer was removed.   Pt initially described the pain as a minor sharp pain, but also states that it is a dull aching pain at times.   Pain comes and goes. Does not seem to be related to eating or activity.   Pt rates pain at 1 out of 10, but pt states that he does have a high pain tolerance.  Denies nausea/vomiting, blood in stool, changes to bowel habits, or other emergent symptoms.    Pt is scheduled for follow up imaging and colonoscopy in March 2024.  Pt is very concerned regarding his new symptoms if is wondering if either of these could be moved up.    Will route to Dr Leslie for advice.  OK to leave a detailed message if unable to reach pt.    Patient verbalized understanding and agreement with plan.  Patient was instructed to call the clinic with any questions, concerns, or worsening symptoms.    Emerald Resendiz RN on 12/11/2023 at 10:06 AM

## 2023-12-12 ENCOUNTER — TELEPHONE (OUTPATIENT)
Dept: GASTROENTEROLOGY | Facility: CLINIC | Age: 53
End: 2023-12-12

## 2023-12-12 ENCOUNTER — VIRTUAL VISIT (OUTPATIENT)
Dept: ONCOLOGY | Facility: CLINIC | Age: 53
End: 2023-12-12
Payer: COMMERCIAL

## 2023-12-12 VITALS — HEIGHT: 71 IN | WEIGHT: 233 LBS | BODY MASS INDEX: 32.62 KG/M2

## 2023-12-12 DIAGNOSIS — C18.2 MALIGNANT NEOPLASM OF ASCENDING COLON (H): Primary | ICD-10-CM

## 2023-12-12 PROCEDURE — 99214 OFFICE O/P EST MOD 30 MIN: CPT | Mod: VID | Performed by: INTERNAL MEDICINE

## 2023-12-12 ASSESSMENT — PAIN SCALES - GENERAL: PAINLEVEL: NO PAIN (0)

## 2023-12-12 NOTE — TELEPHONE ENCOUNTER
Reason for Reschedule/Cancellation (please be detailed, any staff messages or encounters to note?): Provider unavailable       Prior to reschedule please review:  Ordering Provider: Mary Cline MD   Sedation per order: [facility]  Does patient have any ASC Exclusions, please identify?: N      Notes on Cancelled Procedure:  Procedure: Lower Endoscopy [Colonoscopy]   Date: 03/07  Location: [facility]; [address], [place] [address]  Surgeon: ROMEL Andersen      Rescheduled: NoPatient wanted the procedure under [facility] and a new order was placed under [facility]. Patient states he needs this completed in a certain timeframe with CT and Provider appt.

## 2023-12-12 NOTE — LETTER
12/12/2023         RE: Ryan Briseno  [address]  [facility] [address]        Dear Colleague,    Thank you for referring your patient, Ryan Briseno, to the [facility]. Please see a copy of my visit note below.    Virtual Visit Details    Type of service:  Video Visit     Originating Location (pt. Location): Home    Distant Location (provider location):  Off-site  Platform used for Video Visit: [email or web address]  Video start time. 12:24 PM  Video stop time. 12:34 PM     Oncology follow-up visit:  Date on this visit: 12/12/23     Diagnosis.  colon adenocarcinoma    Primary Physician: Mary Cline     History Of Present Illness:  Mr. Briseno is a 53 year old  male who presents for follow-up of colon adenocarcinoma.  I initially saw him on 5/10/2023.  Please see my previous note for details.  I have copied and updated from prior notes.    I have reviewed notes from colorectal surgeon Dr. Mancini    He had a positive Cologuard test so underwent colonoscopy on 12/30/2022.  It showed 35 mm sessile polyp in the ascending colon, about 10 cm from the ileocecal valve.  This was not resected and biopsies showed tubulovillous adenoma.   Two sessile polyps were found in the transverse colon. The polyps were 7 to 9 mm in size. These polyps were removed with a cold snare.  Pathology showed tubular adenoma.    Two sessile polyps were found in the sigmoid colon. The polyps were 5 to 7 mm in size. These polyps were removed with a cold snare.  Pathology showed hyperplastic polyps    On 3/17/2023 he had endoscopic resection by Dr. Jaffe of the ascending colon polyp.  Pathology demonstrated focal adenocarcinoma arising in a tubular adenoma with high-grade dysplasia.  The focus of adenocarcinoma was <1mm and invaded into the submucosa approximately 0.5mm (SM1).  Margins were negative.  No evidence of lymphovascular invasion perineural invasion or tumor budding.  MMR is staining was attempted but  adenocarcinoma was not identified.    3/25/2023.  CT chest abdomen and pelvis showed a tiny hypodensity in the subcapsular left hepatic lobe.  12 mm enlarged portacaval lymph node with several surrounding prominent lymph nodes.  1 cm right adrenal nodule was also seen.  No evidence of metastatic disease in the chest.    CT abdomen on 3/31/2023 showed 1 cm benign right adrenal adenoma.  11 mm prominent tamia hepatic lymph node was seen.    4/14/2023.  MRI abdomen with Eovist showed the right adrenal gland nodule consistent with benign adenoma.  Subcentimeter short axis portacaval lymph node.  Benign simple cyst of the liver was seen.  No concerning liver lesions were seen.    He met with Dr. Mancini who discussed with him option of proceeding with a right hemicolectomy versus observation alone.  When he saw me on 5/10/2023, I had discussed the situation with him in detail.  He wanted to discuss with his wife before giving us final answer.   I had also recommended that he speaks with genetic counselor because of personal and family history of cancers to consider genetic testing.     After a thorough discussion about proceeding with a right hemicolectomy versus observation, he opted for observation alone.    Interval history.   This is a video visit.  This appointment was made because he noticed some abdominal discomfort.  He mentions that on Saturday he was noticing abdominal discomfort in the right side of the abdomen and he felt about the same when he had the endoscopic dissection.  This feeling lasted about a couple of hours so he became worried.  Since then he has been noticing that discomfort off and on but he felt it much less frequently on Sunday and very occasionally yesterday but has not felt this today.  When he presses on the right side of the abdomen, he does not have any pain.  Previously when he initially felt it, he could feel it more readily upon pressing the right side of the abdomen.  Currently  denies any symptoms.  He did not have any fevers nausea vomiting diarrhea constipation or bleeding.  Otherwise he feels well.  No shortness of breath.     ECOG 0    ROS:  A ROS was otherwise neg    I reviewed other history in epic as below.      Past Medical/Surgical History:  Past Medical History:   Diagnosis Date     Hypertension      Malignant neoplasm of colon, unspecified part of colon (H) 9/29/2023     Past Surgical History:   Procedure Laterality Date     COLONOSCOPY N/A 12/30/2022    Procedure: COLONOSCOPY, WITH POLYPECTOMY AND BIOPSY;  Surgeon: Tyler Pandey MD;  Location: [facility] OR     COLONOSCOPY N/A 12/30/2022    Procedure: COLONOSCOPY, FLEXIBLE, WITH LESION REMOVAL USING SNARE;  Surgeon: yTler Pandey MD;  Location: [facility] OR     COLONOSCOPY WITH CO2 INSUFFLATION N/A 12/30/2022    Procedure: COLONOSCOPY, WITH CO2 INSUFFLATION;  Surgeon: Tyler Pandey MD;  Location: [facility] OR     COLONOSCOPY, SUBMUCOSA RESECTION N/A 3/17/2023    Procedure: SUBMUCOSAL RESECTION;  Surgeon: Jos Jaffe MD;  Location: [facility] OR     Cancer History:   As above    Allergies:  Allergies as of 12/12/2023 - Reviewed 10/17/2023   Allergen Reaction Noted     Penicillins Hives 02/01/2021     Current Medications:  No current outpatient medications on file.      Family History:  Family History   Problem Relation Age of Onset     Hypertension Mother      Colon Cancer Mother      Cerebrovascular Disease Maternal Grandfather      Colon Cancer Paternal Grandfather    mother had colon cancer in her 60s.  Paternal grand father had rectal cancer  He has 3 kids- all healthy      Social History:  Social History     Socioeconomic History     Marital status:      Spouse name: Not on file     Number of children: Not on file     Years of education: Not on file     Highest education level: Not on file   Occupational History     Not on file   Tobacco Use     Smoking status: Every Day     Packs/day: 0.50     Years:  30.00     Additional pack years: 0.00     Total pack years: 15.00     Types: Cigarettes     Smokeless tobacco: Never     Tobacco comments:     Have smoked foe 30yrs and tried to quit 3 times.   Vaping Use     Vaping Use: Never used   Substance and Sexual Activity     Alcohol use: Not Currently     Comment: not for 5 years      Drug use: Not Currently     Comment: usage was back in my 20's and 30's.     Sexual activity: Not Currently     Partners: Female     Birth control/protection: None   Other Topics Concern     Parent/sibling w/ CABG, MI or angioplasty before 65F 55M? No   Social History Narrative     Not on file     Social Determinants of Health     Financial Resource Strain: Low Risk  (9/22/2023)    Financial Resource Strain      Within the past 12 months, have you or your family members you live with been unable to get utilities (heat, electricity) when it was really needed?: No   Food Insecurity: Low Risk  (9/22/2023)    Food Insecurity      Within the past 12 months, did you worry that your food would run out before you got money to buy more?: No      Within the past 12 months, did the food you bought just not last and you didn t have money to get more?: No   Transportation Needs: Low Risk  (9/22/2023)    Transportation Needs      Within the past 12 months, has lack of transportation kept you from medical appointments, getting your medicines, non-medical meetings or appointments, work, or from getting things that you need?: No   Physical Activity: Not on file   Stress: Not on file   Social Connections: Not on file   Interpersonal Safety: Low Risk  (9/29/2023)    Interpersonal Safety      Do you feel physically and emotionally safe where you currently live?: Yes      Within the past 12 months, have you been hit, slapped, kicked or otherwise physically hurt by someone?: No      Within the past 12 months, have you been humiliated or emotionally abused in other ways by your partner or ex-partner?: No   Housing  Stability: Low Risk  (9/22/2023)    Housing Stability      Do you have housing? : Yes      Are you worried about losing your housing?: No       Smokes about 1/2 ppd and used to take 1.5 to 2 ppd  Last etoh was in 2017  Lives with wife and 2 kids.  Works at [employer]                                                                                                                                                                                          Physical Exam:  There were no vitals taken for this visit.    Wt Readings from Last 4 Encounters:   10/17/23 105.4 kg (232 lb 6 oz)   09/29/23 106.1 kg (233 lb 12.8 oz)   09/19/23 105.2 kg (232 lb)   05/10/23 110 kg (242 lb 9.6 oz)       Constitutional.  Looks well and in no apparent distress.   Eyes.  Without eye redness or apparent jaundice.   Respiratory.  Non labored breathing. Speaking in full sentences.    Skin.  No concerning skin rashes on the skin visualized.   Neurological.  Is alert and oriented.  Psychiatric.  Mood and affect seem appropriate.      The rest of a comprehensive physical examination is deferred due to Public Health Emergency video visit restrictions.        Laboratory/Imaging Studies      Reviewed    9/15/2023  CEA 2.8    5/10/2023   CBC unremarkable.  CEA 2.8.      CMP unremarkable on 5/12/2023     CT abdomen/pelvis on 9/15/2023  IMPRESSION: In this patient with history of malignant neoplasm of the  ascending colon, the current scan compared to prior CT 3/25/2023  shows:     1. No new metastatic disease in the abdomen or pelvis.  2. Similar appearing subcentimeter left lower hepatic hypodensity, no  new focal hepatic hypodensity.  3. Similar appearing prominent portacaval lymph node. No new  significant lymphadenopathy  4. Similar appearing right adrenal nodule.  5. Additional findings are unchanged, including prostatomegaly with  apparent thickening of the urinary bladder wall, representing  underdistention and/or post urinary outflow  obstructive change    ASSESSMENT/PLAN:      Stage I colon adenocarcinoma which was found in the background of sessile tubulovillous adenoma with high-grade dysplasia which was removed endoscopically on 3/17/2023.  No lymphovascular or perineural invasion or tumor budding.  All margins were negative.  Unable to determine MMR status.    There is slightly prominent tamia hepatis lymph nodes which is indeterminate.  No other evidence of metastatic disease.    He has benign hepatic cyst and right adrenal adenoma.    I had discussed with him that his chances of cure from current resection are high, although there is a small risk that cancer could have spread to pericolonic lymph nodes which have not been resected.  Our options include going ahead with a right hemicolectomy versus observation.      We had discussed pros and cons of each approach.  After a thorough discussion, he opted for watchful waiting.      He felt some discomfort in the abdomen over the last few days and it was similar to when he had endoscopic resection but this feeling has improved over time and currently he is asymptomatic.  We discussed that potentially we can check CT abdomen/pelvis to make sure that there is no evidence of cancer.  The other option would be to hold off on repeating his scans now, as his symptoms have resolved and only do a repeat CT abdomen/pelvis if the symptoms recur.  Currently scheduled for CT scan in March 2024.  As his symptoms have resolved, he wants to wait and see but if symptoms recur, then we will repeat CT scan, otherwise we will do the CT scan as a scheduled in March 2024.    He does have slightly enlarged/prominent periportal lymphadenopathy, so we will need to keep a watch on this.      He is also scheduled to get colonoscopy on 3/7/2023.  We will repeat CEA and I will see him after that.  If his symptoms recur, he will update me.         We did not address the following today    Genetic testing.  It did not  reveal any high risk mutations.  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  Strongly encouraged him to quit smoking.  Congratulated him on his efforts in cutting down smoking.  I offered him referral to smoking cessation program but at this time he is not interested.    Return to clinic as scheduled      All of his questions were answered to his satisfaction.  He is agreeable and comfortable with the plan.    Mark Leslie MD       Again, thank you for allowing me to participate in the care of your patient.        Sincerely,        Mark Leslie MD

## 2023-12-12 NOTE — NURSING NOTE
Is the patient currently in the state of [place]? YES    Visit mode:VIDEO    If the visit is dropped, the patient can be reconnected by: VIDEO VISIT: Text to cell phone:   Telephone Information:   Mobile [phone]       Will anyone else be joining the visit? NO  (If patient encounters technical issues they should call [phone]    How would you like to obtain your AVS? MyChart    Are changes needed to the allergy or medication list? No    Reason for visit: AMAURI RIVERA

## 2023-12-12 NOTE — PROGRESS NOTES
Virtual Visit Details    Type of service:  Video Visit     Originating Location (pt. Location): Home    Distant Location (provider location):  Off-site  Platform used for Video Visit: POS on CLOUD  Video start time. 12:24 PM  Video stop time. 12:34 PM     Oncology follow-up visit:  Date on this visit: 12/12/23     Diagnosis.  colon adenocarcinoma    Primary Physician: Mary Cline     History Of Present Illness:  Mr. Briseno is a 53 year old  male who presents for follow-up of colon adenocarcinoma.  I initially saw him on 5/10/2023.  Please see my previous note for details.  I have copied and updated from prior notes.    I have reviewed notes from colorectal surgeon Dr. Mancini    He had a positive Cologuard test so underwent colonoscopy on 12/30/2022.  It showed 35 mm sessile polyp in the ascending colon, about 10 cm from the ileocecal valve.  This was not resected and biopsies showed tubulovillous adenoma.   Two sessile polyps were found in the transverse colon. The polyps were 7 to 9 mm in size. These polyps were removed with a cold snare.  Pathology showed tubular adenoma.    Two sessile polyps were found in the sigmoid colon. The polyps were 5 to 7 mm in size. These polyps were removed with a cold snare.  Pathology showed hyperplastic polyps    On 3/17/2023 he had endoscopic resection by Dr. Jaffe of the ascending colon polyp.  Pathology demonstrated focal adenocarcinoma arising in a tubular adenoma with high-grade dysplasia.  The focus of adenocarcinoma was <1mm and invaded into the submucosa approximately 0.5mm (SM1).  Margins were negative.  No evidence of lymphovascular invasion perineural invasion or tumor budding.  MMR is staining was attempted but adenocarcinoma was not identified.    3/25/2023.  CT chest abdomen and pelvis showed a tiny hypodensity in the subcapsular left hepatic lobe.  12 mm enlarged portacaval lymph node with several surrounding prominent lymph nodes.  1 cm right adrenal nodule  was also seen.  No evidence of metastatic disease in the chest.    CT abdomen on 3/31/2023 showed 1 cm benign right adrenal adenoma.  11 mm prominent tamia hepatic lymph node was seen.    4/14/2023.  MRI abdomen with Eovist showed the right adrenal gland nodule consistent with benign adenoma.  Subcentimeter short axis portacaval lymph node.  Benign simple cyst of the liver was seen.  No concerning liver lesions were seen.    He met with Dr. Mancini who discussed with him option of proceeding with a right hemicolectomy versus observation alone.  When he saw me on 5/10/2023, I had discussed the situation with him in detail.  He wanted to discuss with his wife before giving us final answer.   I had also recommended that he speaks with genetic counselor because of personal and family history of cancers to consider genetic testing.     After a thorough discussion about proceeding with a right hemicolectomy versus observation, he opted for observation alone.    Interval history.   This is a video visit.  This appointment was made because he noticed some abdominal discomfort.  He mentions that on Saturday he was noticing abdominal discomfort in the right side of the abdomen and he felt about the same when he had the endoscopic dissection.  This feeling lasted about a couple of hours so he became worried.  Since then he has been noticing that discomfort off and on but he felt it much less frequently on Sunday and very occasionally yesterday but has not felt this today.  When he presses on the right side of the abdomen, he does not have any pain.  Previously when he initially felt it, he could feel it more readily upon pressing the right side of the abdomen.  Currently denies any symptoms.  He did not have any fevers nausea vomiting diarrhea constipation or bleeding.  Otherwise he feels well.  No shortness of breath.     ECOG 0    ROS:  A ROS was otherwise neg    I reviewed other history in epic as below.      Past  Medical/Surgical History:  Past Medical History:   Diagnosis Date    Hypertension     Malignant neoplasm of colon, unspecified part of colon (H) 9/29/2023     Past Surgical History:   Procedure Laterality Date    COLONOSCOPY N/A 12/30/2022    Procedure: COLONOSCOPY, WITH POLYPECTOMY AND BIOPSY;  Surgeon: Tyler Pandey MD;  Location: [facility] OR    COLONOSCOPY N/A 12/30/2022    Procedure: COLONOSCOPY, FLEXIBLE, WITH LESION REMOVAL USING SNARE;  Surgeon: Tyler Pandey MD;  Location: [facility] OR    COLONOSCOPY WITH CO2 INSUFFLATION N/A 12/30/2022    Procedure: COLONOSCOPY, WITH CO2 INSUFFLATION;  Surgeon: Tyler Pandey MD;  Location: [facility] OR    COLONOSCOPY, SUBMUCOSA RESECTION N/A 3/17/2023    Procedure: SUBMUCOSAL RESECTION;  Surgeon: Jos Jaffe MD;  Location: [facility] OR     Cancer History:   As above    Allergies:  Allergies as of 12/12/2023 - Reviewed 10/17/2023   Allergen Reaction Noted    Penicillins Hives 02/01/2021     Current Medications:  No current outpatient medications on file.      Family History:  Family History   Problem Relation Age of Onset    Hypertension Mother     Colon Cancer Mother     Cerebrovascular Disease Maternal Grandfather     Colon Cancer Paternal Grandfather    mother had colon cancer in her 60s.  Paternal grand father had rectal cancer  He has 3 kids- all healthy      Social History:  Social History     Socioeconomic History    Marital status:      Spouse name: Not on file    Number of children: Not on file    Years of education: Not on file    Highest education level: Not on file   Occupational History    Not on file   Tobacco Use    Smoking status: Every Day     Packs/day: 0.50     Years: 30.00     Additional pack years: 0.00     Total pack years: 15.00     Types: Cigarettes    Smokeless tobacco: Never    Tobacco comments:     Have smoked foe 30yrs and tried to quit 3 times.   Vaping Use    Vaping Use: Never used   Substance and Sexual Activity     Alcohol use: Not Currently     Comment: not for 5 years     Drug use: Not Currently     Comment: usage was back in my 20's and 30's.    Sexual activity: Not Currently     Partners: Female     Birth control/protection: None   Other Topics Concern    Parent/sibling w/ CABG, MI or angioplasty before 65F 55M? No   Social History Narrative    Not on file     Social Determinants of Health     Financial Resource Strain: Low Risk  (9/22/2023)    Financial Resource Strain     Within the past 12 months, have you or your family members you live with been unable to get utilities (heat, electricity) when it was really needed?: No   Food Insecurity: Low Risk  (9/22/2023)    Food Insecurity     Within the past 12 months, did you worry that your food would run out before you got money to buy more?: No     Within the past 12 months, did the food you bought just not last and you didn t have money to get more?: No   Transportation Needs: Low Risk  (9/22/2023)    Transportation Needs     Within the past 12 months, has lack of transportation kept you from medical appointments, getting your medicines, non-medical meetings or appointments, work, or from getting things that you need?: No   Physical Activity: Not on file   Stress: Not on file   Social Connections: Not on file   Interpersonal Safety: Low Risk  (9/29/2023)    Interpersonal Safety     Do you feel physically and emotionally safe where you currently live?: Yes     Within the past 12 months, have you been hit, slapped, kicked or otherwise physically hurt by someone?: No     Within the past 12 months, have you been humiliated or emotionally abused in other ways by your partner or ex-partner?: No   Housing Stability: Low Risk  (9/22/2023)    Housing Stability     Do you have housing? : Yes     Are you worried about losing your housing?: No       Smokes about 1/2 ppd and used to take 1.5 to 2 ppd  Last etoh was in 2017  Lives with wife and 2 kids.  Works at [employer]                                                                                                                                                                                           Physical Exam:  There were no vitals taken for this visit.    Wt Readings from Last 4 Encounters:   10/17/23 105.4 kg (232 lb 6 oz)   09/29/23 106.1 kg (233 lb 12.8 oz)   09/19/23 105.2 kg (232 lb)   05/10/23 110 kg (242 lb 9.6 oz)       Constitutional.  Looks well and in no apparent distress.   Eyes.  Without eye redness or apparent jaundice.   Respiratory.  Non labored breathing. Speaking in full sentences.    Skin.  No concerning skin rashes on the skin visualized.   Neurological.  Is alert and oriented.  Psychiatric.  Mood and affect seem appropriate.      The rest of a comprehensive physical examination is deferred due to Public Health Emergency video visit restrictions.        Laboratory/Imaging Studies      Reviewed    9/15/2023  CEA 2.8    5/10/2023   CBC unremarkable.  CEA 2.8.      CMP unremarkable on 5/12/2023     CT abdomen/pelvis on 9/15/2023  IMPRESSION: In this patient with history of malignant neoplasm of the  ascending colon, the current scan compared to prior CT 3/25/2023  shows:     1. No new metastatic disease in the abdomen or pelvis.  2. Similar appearing subcentimeter left lower hepatic hypodensity, no  new focal hepatic hypodensity.  3. Similar appearing prominent portacaval lymph node. No new  significant lymphadenopathy  4. Similar appearing right adrenal nodule.  5. Additional findings are unchanged, including prostatomegaly with  apparent thickening of the urinary bladder wall, representing  underdistention and/or post urinary outflow obstructive change    ASSESSMENT/PLAN:      Stage I colon adenocarcinoma which was found in the background of sessile tubulovillous adenoma with high-grade dysplasia which was removed endoscopically on 3/17/2023.  No lymphovascular or perineural invasion or tumor budding.  All margins were  negative.  Unable to determine MMR status.    There is slightly prominent tamia hepatis lymph nodes which is indeterminate.  No other evidence of metastatic disease.    He has benign hepatic cyst and right adrenal adenoma.    I had discussed with him that his chances of cure from current resection are high, although there is a small risk that cancer could have spread to pericolonic lymph nodes which have not been resected.  Our options include going ahead with a right hemicolectomy versus observation.      We had discussed pros and cons of each approach.  After a thorough discussion, he opted for watchful waiting.      He felt some discomfort in the abdomen over the last few days and it was similar to when he had endoscopic resection but this feeling has improved over time and currently he is asymptomatic.  We discussed that potentially we can check CT abdomen/pelvis to make sure that there is no evidence of cancer.  The other option would be to hold off on repeating his scans now, as his symptoms have resolved and only do a repeat CT abdomen/pelvis if the symptoms recur.  Currently scheduled for CT scan in March 2024.  As his symptoms have resolved, he wants to wait and see but if symptoms recur, then we will repeat CT scan, otherwise we will do the CT scan as a scheduled in March 2024.    He does have slightly enlarged/prominent periportal lymphadenopathy, so we will need to keep a watch on this.      He is also scheduled to get colonoscopy on 3/7/2023.  We will repeat CEA and I will see him after that.  If his symptoms recur, he will update me.         We did not address the following today    Genetic testing.  It did not reveal any high risk mutations.  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  Strongly encouraged him to quit smoking.  Congratulated him on his efforts in cutting down  smoking.  I offered him referral to smoking cessation program but at this time he is not interested.    Return to clinic as scheduled      All of his questions were answered to his satisfaction.  He is agreeable and comfortable with the plan.    Mark Leslie MD

## 2023-12-19 ENCOUNTER — NURSE TRIAGE (OUTPATIENT)
Dept: ONCOLOGY | Facility: CLINIC | Age: 53
End: 2023-12-19
Payer: COMMERCIAL

## 2023-12-19 DIAGNOSIS — C18.2 MALIGNANT NEOPLASM OF ASCENDING COLON (H): Primary | ICD-10-CM

## 2023-12-19 NOTE — TELEPHONE ENCOUNTER
This morning had 1 episode of blood in his stool. Blood was bright red and mixed with the stool. Pt states he strained a little this morning to pass stool. No pain when having BM.    Pt reports second time he had BM this morning there was no blood. Pt did not strain when having second BM.     Abd pain, ongoing, achy, and pt states not worsening.     Denies F/C, chest pain, SOB, lightheaded/dizzy, N/V, problems with bladder.    [id number] Paged     0900  calling back and recommending CBC and CEA for tomorrow. Okay to monitor today and keep lab/CT appt scheduled for tomorrow.    0901 Call to pt to inform him of provider response/recommendation. Pt verbalized understanding.

## 2023-12-20 ENCOUNTER — LAB (OUTPATIENT)
Dept: LAB | Facility: CLINIC | Age: 53
End: 2023-12-20
Payer: COMMERCIAL

## 2023-12-20 ENCOUNTER — ANCILLARY PROCEDURE (OUTPATIENT)
Dept: CT IMAGING | Facility: CLINIC | Age: 53
End: 2023-12-20
Attending: INTERNAL MEDICINE
Payer: COMMERCIAL

## 2023-12-20 DIAGNOSIS — C18.2 MALIGNANT NEOPLASM OF ASCENDING COLON (H): ICD-10-CM

## 2023-12-20 DIAGNOSIS — R59.1 LYMPHADENOPATHY: ICD-10-CM

## 2023-12-20 LAB
BASOPHILS # BLD AUTO: 0.1 10E3/UL (ref 0–0.2)
BASOPHILS NFR BLD AUTO: 1 %
CEA SERPL-MCNC: 3.2 NG/ML
EOSINOPHIL # BLD AUTO: 0.3 10E3/UL (ref 0–0.7)
EOSINOPHIL NFR BLD AUTO: 4 %
ERYTHROCYTE [DISTWIDTH] IN BLOOD BY AUTOMATED COUNT: 11.9 % (ref 10–15)
HCT VFR BLD AUTO: 47.6 % (ref 40–53)
HGB BLD-MCNC: 16.8 G/DL (ref 13.3–17.7)
IMM GRANULOCYTES # BLD: 0 10E3/UL
IMM GRANULOCYTES NFR BLD: 0 %
LYMPHOCYTES # BLD AUTO: 2.3 10E3/UL (ref 0.8–5.3)
LYMPHOCYTES NFR BLD AUTO: 27 %
MCH RBC QN AUTO: 32.6 PG (ref 26.5–33)
MCHC RBC AUTO-ENTMCNC: 35.3 G/DL (ref 31.5–36.5)
MCV RBC AUTO: 92 FL (ref 78–100)
MONOCYTES # BLD AUTO: 0.7 10E3/UL (ref 0–1.3)
MONOCYTES NFR BLD AUTO: 9 %
NEUTROPHILS # BLD AUTO: 4.9 10E3/UL (ref 1.6–8.3)
NEUTROPHILS NFR BLD AUTO: 59 %
NRBC # BLD AUTO: 0 10E3/UL
NRBC BLD AUTO-RTO: 0 /100
PLATELET # BLD AUTO: 201 10E3/UL (ref 150–450)
RBC # BLD AUTO: 5.15 10E6/UL (ref 4.4–5.9)
WBC # BLD AUTO: 8.3 10E3/UL (ref 4–11)

## 2023-12-20 PROCEDURE — 82378 CARCINOEMBRYONIC ANTIGEN: CPT

## 2023-12-20 PROCEDURE — 74177 CT ABD & PELVIS W/CONTRAST: CPT | Performed by: RADIOLOGY

## 2023-12-20 PROCEDURE — 85025 COMPLETE CBC W/AUTO DIFF WBC: CPT

## 2023-12-20 PROCEDURE — 36415 COLL VENOUS BLD VENIPUNCTURE: CPT

## 2023-12-20 RX ORDER — IOPAMIDOL 755 MG/ML
122 INJECTION, SOLUTION INTRAVASCULAR ONCE
Status: DISCONTINUED | OUTPATIENT
Start: 2023-12-20 | End: 2023-12-20

## 2023-12-20 RX ORDER — IOPAMIDOL 755 MG/ML
122 INJECTION, SOLUTION INTRAVASCULAR ONCE
Status: COMPLETED | OUTPATIENT
Start: 2023-12-20 | End: 2023-12-20

## 2023-12-20 RX ADMIN — IOPAMIDOL 122 ML: 755 INJECTION, SOLUTION INTRAVASCULAR at 08:20

## 2023-12-21 ENCOUNTER — VIRTUAL VISIT (OUTPATIENT)
Dept: ONCOLOGY | Facility: CLINIC | Age: 53
End: 2023-12-21
Attending: INTERNAL MEDICINE
Payer: COMMERCIAL

## 2023-12-21 VITALS — HEIGHT: 71 IN | BODY MASS INDEX: 33.11 KG/M2 | WEIGHT: 236.5 LBS

## 2023-12-21 DIAGNOSIS — C18.2 MALIGNANT NEOPLASM OF ASCENDING COLON (H): Primary | ICD-10-CM

## 2023-12-21 DIAGNOSIS — K62.5 RECTAL BLEEDING: Primary | ICD-10-CM

## 2023-12-21 PROCEDURE — 99214 OFFICE O/P EST MOD 30 MIN: CPT | Mod: VID | Performed by: INTERNAL MEDICINE

## 2023-12-21 ASSESSMENT — PAIN SCALES - GENERAL: PAINLEVEL: NO PAIN (0)

## 2023-12-21 NOTE — NURSING NOTE
Is the patient currently in the state of [place]? YES    Visit mode:VIDEO    If the visit is dropped, the patient can be reconnected by: VIDEO VISIT: Text to cell phone:   Telephone Information:   Mobile [phone]       Will anyone else be joining the visit? NO  (If patient encounters technical issues they should call [phone]    How would you like to obtain your AVS? MyChart    Are changes needed to the allergy or medication list? Pt stated no changes to allergies and Pt stated no med changes    Reason for visit: AMAURI Dejesus LPN

## 2023-12-21 NOTE — PROGRESS NOTES
Virtual Visit Details    Type of service:  Video Visit     Originating Location (pt. Location): Home    Distant Location (provider location):  On-site  Platform used for Video Visit: [facility]  Video start time. 9:24 AM  Video stop time. 9:29 AM       Oncology follow-up visit:  Date on this visit: 12/21/23     Diagnosis.  colon adenocarcinoma    Primary Physician: Mary Cline     History Of Present Illness:  Mr. Briseno is a 53 year old  male who presents for follow-up of colon adenocarcinoma.  I initially saw him on 5/10/2023.  Please see my previous note for details.  I have copied and updated from prior notes.    I have reviewed notes from colorectal surgeon Dr. Mancini    He had a positive Cologuard test so underwent colonoscopy on 12/30/2022.  It showed 35 mm sessile polyp in the ascending colon, about 10 cm from the ileocecal valve.  This was not resected and biopsies showed tubulovillous adenoma.   Two sessile polyps were found in the transverse colon. The polyps were 7 to 9 mm in size. These polyps were removed with a cold snare.  Pathology showed tubular adenoma.    Two sessile polyps were found in the sigmoid colon. The polyps were 5 to 7 mm in size. These polyps were removed with a cold snare.  Pathology showed hyperplastic polyps    On 3/17/2023 he had endoscopic resection by Dr. Jaffe of the ascending colon polyp.  Pathology demonstrated focal adenocarcinoma arising in a tubular adenoma with high-grade dysplasia.  The focus of adenocarcinoma was <1mm and invaded into the submucosa approximately 0.5mm (SM1).  Margins were negative.  No evidence of lymphovascular invasion perineural invasion or tumor budding.  MMR is staining was attempted but adenocarcinoma was not identified.    3/25/2023.  CT chest abdomen and pelvis showed a tiny hypodensity in the subcapsular left hepatic lobe.  12 mm enlarged portacaval lymph node with several surrounding prominent lymph nodes.  1 cm right adrenal nodule was  also seen.  No evidence of metastatic disease in the chest.    CT abdomen on 3/31/2023 showed 1 cm benign right adrenal adenoma.  11 mm prominent tamia hepatic lymph node was seen.    4/14/2023.  MRI abdomen with Eovist showed the right adrenal gland nodule consistent with benign adenoma.  Subcentimeter short axis portacaval lymph node.  Benign simple cyst of the liver was seen.  No concerning liver lesions were seen.    He met with Dr. Mancini who discussed with him option of proceeding with a right hemicolectomy versus observation alone.  When he saw me on 5/10/2023, I had discussed the situation with him in detail.  He wanted to discuss with his wife before giving us final answer.   I had also recommended that he speaks with genetic counselor because of personal and family history of cancers to consider genetic testing.     After a thorough discussion about proceeding with a right hemicolectomy versus observation, he opted for observation alone.        He saw me last week because he noticed some abdominal discomfort but then it got better.  We had discussed about doing a CT scan but as he was feeling better he opted to wait and watch but later on sent me a message that the abdominal discomfort has returned so we went ahead and did a CT scan.      Interval history.   This is a video visit.    Off-and-on he is getting some discomfort in the right side of his abdomen.  Currently denies any pain.  He also noticed 1 episode of bright red blood per rectum on Monday.  Right now he has good bowel movement.  Denies fevers nausea or vomiting.  Otherwise feels well.      ECOG 0    ROS:  A ROS was otherwise neg    I reviewed other history in epic as below.      Past Medical/Surgical History:  Past Medical History:   Diagnosis Date    Hypertension     Malignant neoplasm of colon, unspecified part of colon (H) 9/29/2023     Past Surgical History:   Procedure Laterality Date    COLONOSCOPY N/A 12/30/2022    Procedure:  COLONOSCOPY, WITH POLYPECTOMY AND BIOPSY;  Surgeon: Tyler Pnadey MD;  Location: [facility] OR    COLONOSCOPY N/A 12/30/2022    Procedure: COLONOSCOPY, FLEXIBLE, WITH LESION REMOVAL USING SNARE;  Surgeon: Tyler Pandey MD;  Location: [facility] OR    COLONOSCOPY WITH CO2 INSUFFLATION N/A 12/30/2022    Procedure: COLONOSCOPY, WITH CO2 INSUFFLATION;  Surgeon: Tyler Pandey MD;  Location: [facility] OR    COLONOSCOPY, SUBMUCOSA RESECTION N/A 3/17/2023    Procedure: SUBMUCOSAL RESECTION;  Surgeon: Jos Jaffe MD;  Location: [facility] OR     Cancer History:   As above    Allergies:  Allergies as of 12/21/2023 - Reviewed 12/12/2023   Allergen Reaction Noted    Penicillins Hives 02/01/2021     Current Medications:  No current outpatient medications on file.      Family History:  Family History   Problem Relation Age of Onset    Hypertension Mother     Colon Cancer Mother     Cerebrovascular Disease Maternal Grandfather     Colon Cancer Paternal Grandfather    mother had colon cancer in her 60s.  Paternal grand father had rectal cancer  He has 3 kids- all healthy      Social History:  Social History     Socioeconomic History    Marital status:      Spouse name: Not on file    Number of children: Not on file    Years of education: Not on file    Highest education level: Not on file   Occupational History    Not on file   Tobacco Use    Smoking status: Every Day     Packs/day: 0.50     Years: 30.00     Additional pack years: 0.00     Total pack years: 15.00     Types: Cigarettes    Smokeless tobacco: Never    Tobacco comments:     Have smoked foe 30yrs and tried to quit 3 times.   Vaping Use    Vaping Use: Never used   Substance and Sexual Activity    Alcohol use: Not Currently     Comment: not for 5 years     Drug use: Not Currently     Comment: usage was back in my 20's and 30's.    Sexual activity: Not Currently     Partners: Female     Birth control/protection: None   Other Topics Concern     Parent/sibling w/ CABG, MI or angioplasty before 65F 55M? No   Social History Narrative    Not on file     Social Determinants of Health     Financial Resource Strain: Low Risk  (9/22/2023)    Financial Resource Strain     Within the past 12 months, have you or your family members you live with been unable to get utilities (heat, electricity) when it was really needed?: No   Food Insecurity: Low Risk  (9/22/2023)    Food Insecurity     Within the past 12 months, did you worry that your food would run out before you got money to buy more?: No     Within the past 12 months, did the food you bought just not last and you didn t have money to get more?: No   Transportation Needs: Low Risk  (9/22/2023)    Transportation Needs     Within the past 12 months, has lack of transportation kept you from medical appointments, getting your medicines, non-medical meetings or appointments, work, or from getting things that you need?: No   Physical Activity: Not on file   Stress: Not on file   Social Connections: Not on file   Interpersonal Safety: Low Risk  (9/29/2023)    Interpersonal Safety     Do you feel physically and emotionally safe where you currently live?: Yes     Within the past 12 months, have you been hit, slapped, kicked or otherwise physically hurt by someone?: No     Within the past 12 months, have you been humiliated or emotionally abused in other ways by your partner or ex-partner?: No   Housing Stability: Low Risk  (9/22/2023)    Housing Stability     Do you have housing? : Yes     Are you worried about losing your housing?: No       Smokes about 1/2 ppd and used to take 1.5 to 2 ppd  Last etoh was in 2017  Lives with wife and 2 kids.  Works at [employer]                                                                                                                                                                                          Physical Exam:  There were no vitals taken for this visit.    Wt Readings  from Last 4 Encounters:   12/12/23 105.7 kg (233 lb)   10/17/23 105.4 kg (232 lb 6 oz)   09/29/23 106.1 kg (233 lb 12.8 oz)   09/19/23 105.2 kg (232 lb)         Constitutional.  Does not seem to be in any acute distress.  Eyes.  No redness or discharge noted.  Respiratory.  Speaking in full sentences.  Breathing seems comfortable without any accessory use of muscles.    Skin.  Visualized his skin does not show any obvious rashes.  Musculoskeletal.  Range of motion for visualized areas is intact.  Neurological.  Alert and oriented x3.  Psychiatric.  Mood, mentation and affect are normal.  Decision making capacity is intact.      The rest of a comprehensive physical examination is deferred due to Public Health Emergency video visit restrictions.      Laboratory/Imaging Studies      Reviewed  12/20/2023  CBC was unremarkable.  Hemoglobin 16.8.    CEA 3.2.      9/15/2023  CEA 2.8    5/10/2023   CBC unremarkable.  CEA 2.8.      CMP unremarkable on 5/12/2023     CT abdomen/pelvis on 12/20/2023 was stable.    Abdomen and pelvis: Subcentimeter fluid attenuation cyst at the  junction of hepatic segments 2 and 4A. No new focal suspicious hepatic  lesion. Gallbladder fundal adenomyomatosis. No intra or extrahepatic  biliary dilation. Punctate calcification in the pancreatic head. No  main pancreatic ductal dilation or suspicious parenchymal lesion.  Normal spleen and left adrenal gland. Stable 1.1 cm right adrenal  nodule previously characterized as a benign adenoma. No renal cortical  mass. No hydronephrosis, hydroureter, or urinary tract stone. Normal  bladder. Mildly enlarged prostate. Symmetric cylindrical vesicles. No  free fluid or free air. No bowel obstruction or inflammation. Colonic  diverticulosis. Normal appendix. Stable prominent, not enlarged tamia  hepatis lymph nodes. No suspicious new or enlarging abdominal or  pelvic lymphadenopathy. Stable lobule of fat with peripheral  calcification anterior to the mid to  distal left ureter. Mild  aortoiliac atherosclerotic calcification without aneurysmal dilation.  The major abdominal vasculature is patent. Retroaortic left renal  vein.     Lung bases/lower chest:  Clear.     Bones and soft tissues: No acute or aggressive osseous abnormality.                                                                      IMPRESSION:   1. No evidence of metastatic disease in the abdomen or pelvis.  2. Stable prominent tamia hepatis lymph nodes, of low suspicion  particularly given ongoing stability.   3. Other chronic and incidental findings as detailed in the body of  the report.    ASSESSMENT/PLAN:      Stage I colon adenocarcinoma which was found in the background of sessile tubulovillous adenoma with high-grade dysplasia which was removed endoscopically on 3/17/2023.  No lymphovascular or perineural invasion or tumor budding.  All margins were negative.  Unable to determine MMR status.    There is slightly prominent tamia hepatis lymph nodes which is indeterminate.  No other evidence of metastatic disease.    He has benign hepatic cyst and right adrenal adenoma.    I had discussed with him that his chances of cure from current resection are high, although there is a small risk that cancer could have spread to pericolonic lymph nodes which have not been resected.  Our options include going ahead with a right hemicolectomy versus observation.      We had discussed pros and cons of each approach.  After a thorough discussion, he opted for watchful waiting.      Recent CT abdomen/pelvis on 12/20/2023 was stable.  Prominent periportal lymph nodes are also stable.  CT scan was done early because he felt some discomfort in the abdomen over the last few days.    He also had an episode of rectal bleeding.      He is supposed to get colonoscopy in March 2024. I have sent a message to Dr Mancini to try to do the colonoscopy earlier because of new symptoms as well as rectal bleeding.       We did not  address the following today    Genetic testing.  It did not reveal any high risk mutations.  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  Strongly encouraged him to quit smoking.  Congratulated him on his efforts in cutting down smoking.  I offered him referral to smoking cessation program but at this time he is not interested.    We will determine the follow-up with me after the colonoscopy      All of his questions were answered to his satisfaction.  He is agreeable and comfortable with the plan.    Mark Leslie MD

## 2023-12-21 NOTE — TELEPHONE ENCOUNTER
Per oncology and RNCC, must be with colorectal surgeon. Patient only wants MAC. Ok'd per Penny Silva RNCC to be w/ Betzaida.     Rescheduled: Yes  Procedure: Lower Endoscopy [Colonoscopy]   Date: 1/17/2024  Location: [facility]; [address] [place] [address] [address], [place] [address]  Surgeon: Betzaida  Sedation Level Scheduled  MAC,  Reason for Sedation Level Patient Preference  Prep/Instructions updated and sent: Jb    **Patient does not want Miralax. He only wants Golytely.**

## 2023-12-21 NOTE — LETTER
12/21/2023         RE: Ryan Briseno  [address]  [facility] [address]        Dear Colleague,    Thank you for referring your patient, Ryan Briseno, to the [facility]. Please see a copy of my visit note below.    Virtual Visit Details    Type of service:  Video Visit     Originating Location (pt. Location): Home    Distant Location (provider location):  On-site  Platform used for Video Visit: [employer]  Video start time. 9:24 AM  Video stop time. 9:29 AM       Oncology follow-up visit:  Date on this visit: 12/21/23     Diagnosis.  colon adenocarcinoma    Primary Physician: Mary Cline     History Of Present Illness:  Mr. Briseno is a 53 year old  male who presents for follow-up of colon adenocarcinoma.  I initially saw him on 5/10/2023.  Please see my previous note for details.  I have copied and updated from prior notes.    I have reviewed notes from colorectal surgeon Dr. Mancini    He had a positive Cologuard test so underwent colonoscopy on 12/30/2022.  It showed 35 mm sessile polyp in the ascending colon, about 10 cm from the ileocecal valve.  This was not resected and biopsies showed tubulovillous adenoma.   Two sessile polyps were found in the transverse colon. The polyps were 7 to 9 mm in size. These polyps were removed with a cold snare.  Pathology showed tubular adenoma.    Two sessile polyps were found in the sigmoid colon. The polyps were 5 to 7 mm in size. These polyps were removed with a cold snare.  Pathology showed hyperplastic polyps    On 3/17/2023 he had endoscopic resection by Dr. Jaffe of the ascending colon polyp.  Pathology demonstrated focal adenocarcinoma arising in a tubular adenoma with high-grade dysplasia.  The focus of adenocarcinoma was <1mm and invaded into the submucosa approximately 0.5mm (SM1).  Margins were negative.  No evidence of lymphovascular invasion perineural invasion or tumor budding.  MMR is staining was attempted but  adenocarcinoma was not identified.    3/25/2023.  CT chest abdomen and pelvis showed a tiny hypodensity in the subcapsular left hepatic lobe.  12 mm enlarged portacaval lymph node with several surrounding prominent lymph nodes.  1 cm right adrenal nodule was also seen.  No evidence of metastatic disease in the chest.    CT abdomen on 3/31/2023 showed 1 cm benign right adrenal adenoma.  11 mm prominent tamia hepatic lymph node was seen.    4/14/2023.  MRI abdomen with Eovist showed the right adrenal gland nodule consistent with benign adenoma.  Subcentimeter short axis portacaval lymph node.  Benign simple cyst of the liver was seen.  No concerning liver lesions were seen.    He met with Dr. Mancini who discussed with him option of proceeding with a right hemicolectomy versus observation alone.  When he saw me on 5/10/2023, I had discussed the situation with him in detail.  He wanted to discuss with his wife before giving us final answer.   I had also recommended that he speaks with genetic counselor because of personal and family history of cancers to consider genetic testing.     After a thorough discussion about proceeding with a right hemicolectomy versus observation, he opted for observation alone.        He saw me last week because he noticed some abdominal discomfort but then it got better.  We had discussed about doing a CT scan but as he was feeling better he opted to wait and watch but later on sent me a message that the abdominal discomfort has returned so we went ahead and did a CT scan.      Interval history.   This is a video visit.    Off-and-on he is getting some discomfort in the right side of his abdomen.  Currently denies any pain.  He also noticed 1 episode of bright red blood per rectum on Monday.  Right now he has good bowel movement.  Denies fevers nausea or vomiting.  Otherwise feels well.      ECOG 0    ROS:  A ROS was otherwise neg    I reviewed other history in epic as below.      Past  Medical/Surgical History:  Past Medical History:   Diagnosis Date    Hypertension     Malignant neoplasm of colon, unspecified part of colon (H) 9/29/2023     Past Surgical History:   Procedure Laterality Date    COLONOSCOPY N/A 12/30/2022    Procedure: COLONOSCOPY, WITH POLYPECTOMY AND BIOPSY;  Surgeon: Tyler Pandey MD;  Location: [facility] OR    COLONOSCOPY N/A 12/30/2022    Procedure: COLONOSCOPY, FLEXIBLE, WITH LESION REMOVAL USING SNARE;  Surgeon: Tyler Pandey MD;  Location: [facility] OR    COLONOSCOPY WITH CO2 INSUFFLATION N/A 12/30/2022    Procedure: COLONOSCOPY, WITH CO2 INSUFFLATION;  Surgeon: Tyler Pandey MD;  Location: [facility] OR    COLONOSCOPY, SUBMUCOSA RESECTION N/A 3/17/2023    Procedure: SUBMUCOSAL RESECTION;  Surgeon: Jos Jaffe MD;  Location: [facility] OR     Cancer History:   As above    Allergies:  Allergies as of 12/21/2023 - Reviewed 12/12/2023   Allergen Reaction Noted    Penicillins Hives 02/01/2021     Current Medications:  No current outpatient medications on file.      Family History:  Family History   Problem Relation Age of Onset    Hypertension Mother     Colon Cancer Mother     Cerebrovascular Disease Maternal Grandfather     Colon Cancer Paternal Grandfather    mother had colon cancer in her 60s.  Paternal grand father had rectal cancer  He has 3 kids- all healthy      Social History:  Social History     Socioeconomic History    Marital status:      Spouse name: Not on file    Number of children: Not on file    Years of education: Not on file    Highest education level: Not on file   Occupational History    Not on file   Tobacco Use    Smoking status: Every Day     Packs/day: 0.50     Years: 30.00     Additional pack years: 0.00     Total pack years: 15.00     Types: Cigarettes    Smokeless tobacco: Never    Tobacco comments:     Have smoked foe 30yrs and tried to quit 3 times.   Vaping Use    Vaping Use: Never used   Substance and Sexual Activity     Alcohol use: Not Currently     Comment: not for 5 years     Drug use: Not Currently     Comment: usage was back in my 20's and 30's.    Sexual activity: Not Currently     Partners: Female     Birth control/protection: None   Other Topics Concern    Parent/sibling w/ CABG, MI or angioplasty before 65F 55M? No   Social History Narrative    Not on file     Social Determinants of Health     Financial Resource Strain: Low Risk  (9/22/2023)    Financial Resource Strain     Within the past 12 months, have you or your family members you live with been unable to get utilities (heat, electricity) when it was really needed?: No   Food Insecurity: Low Risk  (9/22/2023)    Food Insecurity     Within the past 12 months, did you worry that your food would run out before you got money to buy more?: No     Within the past 12 months, did the food you bought just not last and you didn t have money to get more?: No   Transportation Needs: Low Risk  (9/22/2023)    Transportation Needs     Within the past 12 months, has lack of transportation kept you from medical appointments, getting your medicines, non-medical meetings or appointments, work, or from getting things that you need?: No   Physical Activity: Not on file   Stress: Not on file   Social Connections: Not on file   Interpersonal Safety: Low Risk  (9/29/2023)    Interpersonal Safety     Do you feel physically and emotionally safe where you currently live?: Yes     Within the past 12 months, have you been hit, slapped, kicked or otherwise physically hurt by someone?: No     Within the past 12 months, have you been humiliated or emotionally abused in other ways by your partner or ex-partner?: No   Housing Stability: Low Risk  (9/22/2023)    Housing Stability     Do you have housing? : Yes     Are you worried about losing your housing?: No       Smokes about 1/2 ppd and used to take 1.5 to 2 ppd  Last etoh was in 2017  Lives with wife and 2 kids.  Works at [employer]                                                                                                                                                                                           Physical Exam:  There were no vitals taken for this visit.    Wt Readings from Last 4 Encounters:   12/12/23 105.7 kg (233 lb)   10/17/23 105.4 kg (232 lb 6 oz)   09/29/23 106.1 kg (233 lb 12.8 oz)   09/19/23 105.2 kg (232 lb)         Constitutional.  Does not seem to be in any acute distress.  Eyes.  No redness or discharge noted.  Respiratory.  Speaking in full sentences.  Breathing seems comfortable without any accessory use of muscles.    Skin.  Visualized his skin does not show any obvious rashes.  Musculoskeletal.  Range of motion for visualized areas is intact.  Neurological.  Alert and oriented x3.  Psychiatric.  Mood, mentation and affect are normal.  Decision making capacity is intact.      The rest of a comprehensive physical examination is deferred due to Public Health Emergency video visit restrictions.      Laboratory/Imaging Studies      Reviewed  12/20/2023  CBC was unremarkable.  Hemoglobin 16.8.    CEA 3.2.      9/15/2023  CEA 2.8    5/10/2023   CBC unremarkable.  CEA 2.8.      CMP unremarkable on 5/12/2023     CT abdomen/pelvis on 12/20/2023 was stable.    Abdomen and pelvis: Subcentimeter fluid attenuation cyst at the  junction of hepatic segments 2 and 4A. No new focal suspicious hepatic  lesion. Gallbladder fundal adenomyomatosis. No intra or extrahepatic  biliary dilation. Punctate calcification in the pancreatic head. No  main pancreatic ductal dilation or suspicious parenchymal lesion.  Normal spleen and left adrenal gland. Stable 1.1 cm right adrenal  nodule previously characterized as a benign adenoma. No renal cortical  mass. No hydronephrosis, hydroureter, or urinary tract stone. Normal  bladder. Mildly enlarged prostate. Symmetric cylindrical vesicles. No  free fluid or free air. No bowel obstruction or inflammation.  Colonic  diverticulosis. Normal appendix. Stable prominent, not enlarged tamia  hepatis lymph nodes. No suspicious new or enlarging abdominal or  pelvic lymphadenopathy. Stable lobule of fat with peripheral  calcification anterior to the mid to distal left ureter. Mild  aortoiliac atherosclerotic calcification without aneurysmal dilation.  The major abdominal vasculature is patent. Retroaortic left renal  vein.     Lung bases/lower chest:  Clear.     Bones and soft tissues: No acute or aggressive osseous abnormality.                                                                      IMPRESSION:   1. No evidence of metastatic disease in the abdomen or pelvis.  2. Stable prominent tamia hepatis lymph nodes, of low suspicion  particularly given ongoing stability.   3. Other chronic and incidental findings as detailed in the body of  the report.    ASSESSMENT/PLAN:      Stage I colon adenocarcinoma which was found in the background of sessile tubulovillous adenoma with high-grade dysplasia which was removed endoscopically on 3/17/2023.  No lymphovascular or perineural invasion or tumor budding.  All margins were negative.  Unable to determine MMR status.    There is slightly prominent tamia hepatis lymph nodes which is indeterminate.  No other evidence of metastatic disease.    He has benign hepatic cyst and right adrenal adenoma.    I had discussed with him that his chances of cure from current resection are high, although there is a small risk that cancer could have spread to pericolonic lymph nodes which have not been resected.  Our options include going ahead with a right hemicolectomy versus observation.      We had discussed pros and cons of each approach.  After a thorough discussion, he opted for watchful waiting.      Recent CT abdomen/pelvis on 12/20/2023 was stable.  Prominent periportal lymph nodes are also stable.  CT scan was done early because he felt some discomfort in the abdomen over the last few  days.    He also had an episode of rectal bleeding.      He is supposed to get colonoscopy in March 2024. I have sent a message to Dr Mancini to try to do the colonoscopy earlier because of new symptoms as well as rectal bleeding.       We did not address the following today    Genetic testing.  It did not reveal any high risk mutations.  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  Strongly encouraged him to quit smoking.  Congratulated him on his efforts in cutting down smoking.  I offered him referral to smoking cessation program but at this time he is not interested.    We will determine the follow-up with me after the colonoscopy      All of his questions were answered to his satisfaction.  He is agreeable and comfortable with the plan.    Mark Leslie MD

## 2024-01-05 ENCOUNTER — TELEPHONE (OUTPATIENT)
Dept: GASTROENTEROLOGY | Facility: CLINIC | Age: 54
End: 2024-01-05
Payer: COMMERCIAL

## 2024-01-05 DIAGNOSIS — K62.5 RECTAL BLEEDING: Primary | ICD-10-CM

## 2024-01-05 RX ORDER — BISACODYL 5 MG/1
TABLET, DELAYED RELEASE ORAL
Qty: 4 TABLET | Refills: 0 | Status: SHIPPED | OUTPATIENT
Start: 2024-01-05 | End: 2024-03-04

## 2024-01-08 ENCOUNTER — DOCUMENTATION ONLY (OUTPATIENT)
Dept: GASTROENTEROLOGY | Facility: CLINIC | Age: 54
End: 2024-01-08
Payer: COMMERCIAL

## 2024-01-08 NOTE — PROGRESS NOTES
Per request (Perlita Tolbert, RN )    Information faxed:     Patient face sheet, 3.17.2023 anesthesia event, 12.21.2023 oncology visit       Facility Information:   [facility]   Facility main number: [phone] (Roxana Ralph's number: [phone])   ATTN: Roxana Ralph (Pre-Op Surgery Coordinator)   Fax number: [phone]   E-Mail: [email or web address]    Jessy Poon LPN

## 2024-01-09 NOTE — TELEPHONE ENCOUNTER
Pre assessment completed for upcoming procedure.   (Please see previous telephone encounter notes for complete details)      Procedure details:    Arrival time and facility location reviewed.    Pre op exam needed? N/A    Designated  policy reviewed. Instructed to have someone stay 24 hours post procedure.     COVID policy reviewed.      Medication review:    Medications reviewed. Please see supporting documentation below. Holding recommendations discussed (if applicable).       Prep for procedure:     Procedure prep instructions reviewed.        Additional information needed?  N/A      Patient  verbalized understanding and had no questions or concerns at this time.      Perlita Tolbert RN  Endoscopy Procedure Pre Assessment RN  [phone] option 4

## 2024-01-17 ENCOUNTER — TRANSFERRED RECORDS (OUTPATIENT)
Dept: HEALTH INFORMATION MANAGEMENT | Facility: CLINIC | Age: 54
End: 2024-01-17
Payer: COMMERCIAL

## 2024-01-17 PROCEDURE — 88305 TISSUE EXAM BY PATHOLOGIST: CPT | Mod: 26 | Performed by: PATHOLOGY

## 2024-01-17 PROCEDURE — 88305 TISSUE EXAM BY PATHOLOGIST: CPT | Mod: TC,ORL | Performed by: COLON & RECTAL SURGERY

## 2024-01-18 ENCOUNTER — LAB REQUISITION (OUTPATIENT)
Dept: LAB | Facility: CLINIC | Age: 54
End: 2024-01-18
Payer: COMMERCIAL

## 2024-01-19 LAB
PATH REPORT.COMMENTS IMP SPEC: NORMAL
PATH REPORT.FINAL DX SPEC: NORMAL
PATH REPORT.GROSS SPEC: NORMAL
PATH REPORT.MICROSCOPIC SPEC OTHER STN: NORMAL
PATH REPORT.RELEVANT HX SPEC: NORMAL
PHOTO IMAGE: NORMAL

## 2024-01-25 ENCOUNTER — VIRTUAL VISIT (OUTPATIENT)
Dept: ONCOLOGY | Facility: CLINIC | Age: 54
End: 2024-01-25
Attending: INTERNAL MEDICINE
Payer: COMMERCIAL

## 2024-01-25 VITALS — WEIGHT: 233.19 LBS | HEIGHT: 71 IN | BODY MASS INDEX: 32.65 KG/M2

## 2024-01-25 DIAGNOSIS — C18.2 MALIGNANT NEOPLASM OF ASCENDING COLON (H): Primary | ICD-10-CM

## 2024-01-25 PROCEDURE — 99215 OFFICE O/P EST HI 40 MIN: CPT | Mod: 95 | Performed by: INTERNAL MEDICINE

## 2024-01-25 ASSESSMENT — PAIN SCALES - GENERAL: PAINLEVEL: NO PAIN (0)

## 2024-01-25 NOTE — NURSING NOTE
Is the patient currently in the state of [place]? YES    Visit mode:VIDEO    If the visit is dropped, the patient can be reconnected by: VIDEO VISIT: Text to cell phone:   Telephone Information:   Mobile [phone]       Will anyone else be joining the visit? NO  (If patient encounters technical issues they should call [phone]    How would you like to obtain your AVS? MyChart    Are changes needed to the allergy or medication list? Pt stated no changes to allergies, Pt stated no med changes, and medications from colonoscopy are flagged for removal    Reason for visit: AMAURI Dejesus LPN

## 2024-01-25 NOTE — PROGRESS NOTES
Oncology follow-up visit:  Date on this visit: 1/25/24     Diagnosis.  colon adenocarcinoma    Primary Physician: Mary Cline     History Of Present Illness:  Mr. Briseno is a 53 year old  male who presents for follow-up of colon adenocarcinoma.  I initially saw him on 5/10/2023.  Please see my previous note for details.  I have copied and updated from prior notes.    I have reviewed notes from colorectal surgeon Dr. Mancini    He had a positive Cologuard test so underwent colonoscopy on 12/30/2022.  It showed 35 mm sessile polyp in the ascending colon, about 10 cm from the ileocecal valve.  This was not resected and biopsies showed tubulovillous adenoma.   Two sessile polyps were found in the transverse colon. The polyps were 7 to 9 mm in size. These polyps were removed with a cold snare.  Pathology showed tubular adenoma.    Two sessile polyps were found in the sigmoid colon. The polyps were 5 to 7 mm in size. These polyps were removed with a cold snare.  Pathology showed hyperplastic polyps    On 3/17/2023 he had endoscopic resection by Dr. Jaffe of the ascending colon polyp.  Pathology demonstrated focal adenocarcinoma arising in a tubular adenoma with high-grade dysplasia.  The focus of adenocarcinoma was <1mm and invaded into the submucosa approximately 0.5mm (SM1).  Margins were negative.  No evidence of lymphovascular invasion perineural invasion or tumor budding.  MMR is staining was attempted but adenocarcinoma was not identified.    3/25/2023.  CT chest abdomen and pelvis showed a tiny hypodensity in the subcapsular left hepatic lobe.  12 mm enlarged portacaval lymph node with several surrounding prominent lymph nodes.  1 cm right adrenal nodule was also seen.  No evidence of metastatic disease in the chest.    CT abdomen on 3/31/2023 showed 1 cm benign right adrenal adenoma.  11 mm prominent tamia hepatic lymph node was seen.    4/14/2023.  MRI abdomen with Eovist showed the right adrenal  gland nodule consistent with benign adenoma.  Subcentimeter short axis portacaval lymph node.  Benign simple cyst of the liver was seen.  No concerning liver lesions were seen.    He met with Dr. Mancini who discussed with him option of proceeding with a right hemicolectomy versus observation alone.  When he saw me on 5/10/2023, I had discussed the situation with him in detail.  He wanted to discuss with his wife before giving us final answer.   I had also recommended that he speaks with genetic counselor because of personal and family history of cancers to consider genetic testing.     After a thorough discussion about proceeding with a right hemicolectomy versus observation, he opted for observation alone.        He saw in Dec 2023 because he noticed some abdominal discomfort which was going on off and on for several days.      12/20/2023.  CT abdomen/pelvis was done which was unremarkable.    I had recommended proceeding with a colonoscopy.      Interval history.   This is a video visit.    He had a colonoscopy on 1/17/2024 which showed a 5 mm polyp in the cecum which was removed.  Post polypectomy scar was seen in the ascending colon with a recurrent polyp.  He had polypectomy for this as well and the site was treated with APC and tattooed.    Four, 2 to 3 mm polyps in the rectosigmoid colon were removed.  Four 1 to 2 mm polyps in the rectosigmoid colon were fulgurated.  The biopsy from the ascending colon polyp showed fragments of tubular adenomas with few fragments showing foci of high-grade dysplasia but negative for deeply invasive malignancy.  Pathology from rectosigmoid colon polyps showed hyperplastic polyps.  Dr. Huston discussed with him about proceeding with surgery but he was not interested and it was discussed to repeat colonoscopy in 1 year for close follow-up.     He continues to feel well.  Denies any abdominal pain.  Bowel movements are fine.  No bleeding.  No new swellings.  No nausea  vomiting diarrhea constipation.  Otherwise feels good with good energy.        ECOG 0    ROS:  A ROS was otherwise neg    I reviewed other history in epic as below.      Past Medical/Surgical History:  Past Medical History:   Diagnosis Date    Hypertension     Malignant neoplasm of colon, unspecified part of colon (H) 9/29/2023     Past Surgical History:   Procedure Laterality Date    COLONOSCOPY N/A 12/30/2022    Procedure: COLONOSCOPY, WITH POLYPECTOMY AND BIOPSY;  Surgeon: Tyler Pandey MD;  Location: [facility] OR    COLONOSCOPY N/A 12/30/2022    Procedure: COLONOSCOPY, FLEXIBLE, WITH LESION REMOVAL USING SNARE;  Surgeon: Tyler Pandey MD;  Location: [facility] OR    COLONOSCOPY WITH CO2 INSUFFLATION N/A 12/30/2022    Procedure: COLONOSCOPY, WITH CO2 INSUFFLATION;  Surgeon: Tyler Pandey MD;  Location: [facility] OR    COLONOSCOPY, SUBMUCOSA RESECTION N/A 3/17/2023    Procedure: SUBMUCOSAL RESECTION;  Surgeon: Jos Jaffe MD;  Location: [facility] OR     Cancer History:   As above    Allergies:  Allergies as of 01/25/2024 - Reviewed 01/25/2024   Allergen Reaction Noted    Penicillins Hives 02/01/2021     Current Medications:  Current Outpatient Medications   Medication Sig Dispense Refill    bisacodyl (DULCOLAX) 5 MG EC tablet Take 2 tablets at 3 pm the day before your procedure. If your procedure is before 11 am, take 2 additional tablets at 11 pm. If your procedure is after 11 am, take 2 additional tablets at 6 am. For additional instructions refer to your colonoscopy prep instructions. 4 tablet 0    polyethylene glycol (GOLYTELY) 236 g suspension The night before the exam at 6 pm drink an 8-ounce glass every 15 minutes until the jug is half empty. If you arrive before 11 AM: Drink the other half of the Golytely jug at 11 PM night before procedure. If you arrive after 11 AM: Drink the other half of the Golytely jug at 6 AM day of procedure. For additional instructions refer to your colonoscopy  prep instructions. 4000 mL 0      Family History:  Family History   Problem Relation Age of Onset    Hypertension Mother     Colon Cancer Mother     Cerebrovascular Disease Maternal Grandfather     Colon Cancer Paternal Grandfather    mother had colon cancer in her 60s.  Paternal grand father had rectal cancer  He has 3 kids- all healthy      Social History:  Social History     Socioeconomic History    Marital status:      Spouse name: Not on file    Number of children: Not on file    Years of education: Not on file    Highest education level: Not on file   Occupational History    Not on file   Tobacco Use    Smoking status: Every Day     Packs/day: 0.50     Years: 30.00     Additional pack years: 0.00     Total pack years: 15.00     Types: Cigarettes    Smokeless tobacco: Never    Tobacco comments:     Have smoked foe 30yrs and tried to quit 3 times.   Vaping Use    Vaping Use: Never used   Substance and Sexual Activity    Alcohol use: Not Currently     Comment: not for 5 years     Drug use: Not Currently     Comment: usage was back in my 20's and 30's.    Sexual activity: Not Currently     Partners: Female     Birth control/protection: None   Other Topics Concern    Parent/sibling w/ CABG, MI or angioplasty before 65F 55M? No   Social History Narrative    Not on file     Social Determinants of Health     Financial Resource Strain: Low Risk  (9/22/2023)    Financial Resource Strain     Within the past 12 months, have you or your family members you live with been unable to get utilities (heat, electricity) when it was really needed?: No   Food Insecurity: Low Risk  (9/22/2023)    Food Insecurity     Within the past 12 months, did you worry that your food would run out before you got money to buy more?: No     Within the past 12 months, did the food you bought just not last and you didn t have money to get more?: No   Transportation Needs: Low Risk  (9/22/2023)    Transportation Needs     Within the past 12  "months, has lack of transportation kept you from medical appointments, getting your medicines, non-medical meetings or appointments, work, or from getting things that you need?: No   Physical Activity: Not on file   Stress: Not on file   Social Connections: Not on file   Interpersonal Safety: Low Risk  (9/29/2023)    Interpersonal Safety     Do you feel physically and emotionally safe where you currently live?: Yes     Within the past 12 months, have you been hit, slapped, kicked or otherwise physically hurt by someone?: No     Within the past 12 months, have you been humiliated or emotionally abused in other ways by your partner or ex-partner?: No   Housing Stability: Low Risk  (9/22/2023)    Housing Stability     Do you have housing? : Yes     Are you worried about losing your housing?: No       Smokes about 1/2 ppd and used to take 1.5 to 2 ppd  Last etoh was in 2017  Lives with wife and 2 kids.  Works at [employer]                                                                                                                                                                                          Physical Exam:  Ht 1.803 m (5' 11\")   Wt 105.8 kg (233 lb 3 oz)   BMI 32.52 kg/m      Wt Readings from Last 4 Encounters:   01/25/24 105.8 kg (233 lb 3 oz)   12/21/23 107.3 kg (236 lb 8 oz)   12/12/23 105.7 kg (233 lb)   10/17/23 105.4 kg (232 lb 6 oz)       Constitutional.  Looks well and in no apparent distress.   Eyes.  Without eye redness or apparent jaundice.   Respiratory.  Non labored breathing. Speaking in full sentences.    Skin.  No concerning skin rashes on the skin visualized.   Neurological.  Is alert and oriented.  Psychiatric.  Mood and affect seem appropriate.      The rest of a comprehensive physical examination is deferred due to Public Health Emergency video visit restrictions.        Laboratory/Imaging Studies      Reviewed  12/20/2023  CBC was unremarkable.  Hemoglobin 16.8.    CEA " 3.2.      9/15/2023  CEA 2.8    5/10/2023   CBC unremarkable.  CEA 2.8.      CMP unremarkable on 5/12/2023     CT abdomen/pelvis on 12/20/2023 was stable.    Abdomen and pelvis: Subcentimeter fluid attenuation cyst at the  junction of hepatic segments 2 and 4A. No new focal suspicious hepatic  lesion. Gallbladder fundal adenomyomatosis. No intra or extrahepatic  biliary dilation. Punctate calcification in the pancreatic head. No  main pancreatic ductal dilation or suspicious parenchymal lesion.  Normal spleen and left adrenal gland. Stable 1.1 cm right adrenal  nodule previously characterized as a benign adenoma. No renal cortical  mass. No hydronephrosis, hydroureter, or urinary tract stone. Normal  bladder. Mildly enlarged prostate. Symmetric cylindrical vesicles. No  free fluid or free air. No bowel obstruction or inflammation. Colonic  diverticulosis. Normal appendix. Stable prominent, not enlarged tamia  hepatis lymph nodes. No suspicious new or enlarging abdominal or  pelvic lymphadenopathy. Stable lobule of fat with peripheral  calcification anterior to the mid to distal left ureter. Mild  aortoiliac atherosclerotic calcification without aneurysmal dilation.  The major abdominal vasculature is patent. Retroaortic left renal  vein.     Lung bases/lower chest:  Clear.     Bones and soft tissues: No acute or aggressive osseous abnormality.                                                                      IMPRESSION:   1. No evidence of metastatic disease in the abdomen or pelvis.  2. Stable prominent tamia hepatis lymph nodes, of low suspicion  particularly given ongoing stability.   3. Other chronic and incidental findings as detailed in the body of  the report.    ASSESSMENT/PLAN:      Stage I colon adenocarcinoma which was found in the background of sessile tubulovillous adenoma with high-grade dysplasia which was removed endoscopically on 3/17/2023.  No lymphovascular or perineural invasion or tumor  budding.  All margins were negative.  Unable to determine MMR status.    There is slightly prominent tamia hepatis lymph nodes which is indeterminate.  No other evidence of metastatic disease.    He has benign hepatic cyst and right adrenal adenoma.    I had discussed with him that his chances of cure from current resection are high, although there is a small risk that cancer could have spread to pericolonic lymph nodes which have not been resected.  Our options include going ahead with a right hemicolectomy versus observation.      We had discussed pros and cons of each approach.  After a thorough discussion, he opted for watchful waiting.      Recent CT abdomen/pelvis on 12/20/2023 was stable.  Prominent periportal lymph nodes are also stable.  CT scan was done early because he felt some discomfort in the abdomen over the last few days.    He also had an episode of rectal bleeding.      He had colonoscopy on 1/17/2024 which showed 2 ascending colon adenomas. One is essentially recurrent adenoma at the EMR scar. Path showed tubular adenoma with foci of high-grade dysplasia but no invasive malignancy.      We again discussed the situation in detail.  It is unusual to have a recurrent adenoma at the previous EMR site as margins on the previous lesions were negative.    We again discussed that surgery offers the best chance of a cure.    Personally I would recommend surgery as the first option. Otherwise he will need very close monitoring with repeat colonoscopy surveillance in 1 year.    He is leaning towards close surveillance right now but he wants to discuss the situation with his wife before making a final decision.    If he continues with the observation route then I would like to see him in 1 year with CT and colonoscopy prior      Discussion regarding smoking.  Trying to cut down on smoking. Currently smoking a little over half a pack per day.  Congratulated him on his efforts in cutting down smoking.   Encouraged to keep working hard to completely stop this in the near future. I again offered him referral to smoking cessation program but he does not want this.       We did not address the following today    Genetic testing.  It did not reveal any high risk mutations.  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  He is agreeable and comfortable with the plan.    Mark Leslie MD

## 2024-01-25 NOTE — PROGRESS NOTES
Virtual Visit Details    Type of service:  Video Visit     Originating Location (pt. Location): Home    Distant Location (provider location):  On-site  Platform used for Video Visit: [employer]  Video start time. 12:18 PM  Video stop time. 12:33 PM

## 2024-01-25 NOTE — LETTER
1/25/2024         RE: Ryan Briseno  [address]  [facility] [address]        Dear Colleague,    Thank you for referring your patient, Ryan Briseno, to the [facility]. Please see a copy of my visit note below.    Virtual Visit Details    Type of service:  Video Visit     Originating Location (pt. Location): Home    Distant Location (provider location):  On-site  Platform used for Video Visit: WeDeliver  Video start time. 12:18 PM  Video stop time. 12:33 PM     Oncology follow-up visit:  Date on this visit: 1/25/24     Diagnosis.  colon adenocarcinoma    Primary Physician: Mary Cline     History Of Present Illness:  Mr. Briseno is a 53 year old  male who presents for follow-up of colon adenocarcinoma.  I initially saw him on 5/10/2023.  Please see my previous note for details.  I have copied and updated from prior notes.    I have reviewed notes from colorectal surgeon Dr. Mancini    He had a positive Cologuard test so underwent colonoscopy on 12/30/2022.  It showed 35 mm sessile polyp in the ascending colon, about 10 cm from the ileocecal valve.  This was not resected and biopsies showed tubulovillous adenoma.   Two sessile polyps were found in the transverse colon. The polyps were 7 to 9 mm in size. These polyps were removed with a cold snare.  Pathology showed tubular adenoma.    Two sessile polyps were found in the sigmoid colon. The polyps were 5 to 7 mm in size. These polyps were removed with a cold snare.  Pathology showed hyperplastic polyps    On 3/17/2023 he had endoscopic resection by Dr. Jaffe of the ascending colon polyp.  Pathology demonstrated focal adenocarcinoma arising in a tubular adenoma with high-grade dysplasia.  The focus of adenocarcinoma was <1mm and invaded into the submucosa approximately 0.5mm (SM1).  Margins were negative.  No evidence of lymphovascular invasion perineural invasion or tumor budding.  MMR is staining was attempted but  adenocarcinoma was not identified.    3/25/2023.  CT chest abdomen and pelvis showed a tiny hypodensity in the subcapsular left hepatic lobe.  12 mm enlarged portacaval lymph node with several surrounding prominent lymph nodes.  1 cm right adrenal nodule was also seen.  No evidence of metastatic disease in the chest.    CT abdomen on 3/31/2023 showed 1 cm benign right adrenal adenoma.  11 mm prominent tamia hepatic lymph node was seen.    4/14/2023.  MRI abdomen with Eovist showed the right adrenal gland nodule consistent with benign adenoma.  Subcentimeter short axis portacaval lymph node.  Benign simple cyst of the liver was seen.  No concerning liver lesions were seen.    He met with Dr. Mancini who discussed with him option of proceeding with a right hemicolectomy versus observation alone.  When he saw me on 5/10/2023, I had discussed the situation with him in detail.  He wanted to discuss with his wife before giving us final answer.   I had also recommended that he speaks with genetic counselor because of personal and family history of cancers to consider genetic testing.     After a thorough discussion about proceeding with a right hemicolectomy versus observation, he opted for observation alone.        He saw in Dec 2023 because he noticed some abdominal discomfort which was going on off and on for several days.      12/20/2023.  CT abdomen/pelvis was done which was unremarkable.    I had recommended proceeding with a colonoscopy.      Interval history.   This is a video visit.    He had a colonoscopy on 1/17/2024 which showed a 5 mm polyp in the cecum which was removed.  Post polypectomy scar was seen in the ascending colon with a recurrent polyp.  He had polypectomy for this as well and the site was treated with APC and tattooed.    Four, 2 to 3 mm polyps in the rectosigmoid colon were removed.  Four 1 to 2 mm polyps in the rectosigmoid colon were fulgurated.  The biopsy from the ascending colon polyp  showed fragments of tubular adenomas with few fragments showing foci of high-grade dysplasia but negative for deeply invasive malignancy.  Pathology from rectosigmoid colon polyps showed hyperplastic polyps.  Dr. Huston discussed with him about proceeding with surgery but he was not interested and it was discussed to repeat colonoscopy in 1 year for close follow-up.     He continues to feel well.  Denies any abdominal pain.  Bowel movements are fine.  No bleeding.  No new swellings.  No nausea vomiting diarrhea constipation.  Otherwise feels good with good energy.        ECOG 0    ROS:  A ROS was otherwise neg    I reviewed other history in epic as below.      Past Medical/Surgical History:  Past Medical History:   Diagnosis Date    Hypertension     Malignant neoplasm of colon, unspecified part of colon (H) 9/29/2023     Past Surgical History:   Procedure Laterality Date    COLONOSCOPY N/A 12/30/2022    Procedure: COLONOSCOPY, WITH POLYPECTOMY AND BIOPSY;  Surgeon: Tyler Pandey MD;  Location: [facility] OR    COLONOSCOPY N/A 12/30/2022    Procedure: COLONOSCOPY, FLEXIBLE, WITH LESION REMOVAL USING SNARE;  Surgeon: Tyler Pandey MD;  Location: [facility] OR    COLONOSCOPY WITH CO2 INSUFFLATION N/A 12/30/2022    Procedure: COLONOSCOPY, WITH CO2 INSUFFLATION;  Surgeon: Tyler Pandey MD;  Location: [facility] OR    COLONOSCOPY, SUBMUCOSA RESECTION N/A 3/17/2023    Procedure: SUBMUCOSAL RESECTION;  Surgeon: Jos Jaffe MD;  Location: [facility] OR     Cancer History:   As above    Allergies:  Allergies as of 01/25/2024 - Reviewed 01/25/2024   Allergen Reaction Noted    Penicillins Hives 02/01/2021     Current Medications:  Current Outpatient Medications   Medication Sig Dispense Refill    bisacodyl (DULCOLAX) 5 MG EC tablet Take 2 tablets at 3 pm the day before your procedure. If your procedure is before 11 am, take 2 additional tablets at 11 pm. If your procedure is after 11 am, take 2 additional tablets  at 6 am. For additional instructions refer to your colonoscopy prep instructions. 4 tablet 0    polyethylene glycol (GOLYTELY) 236 g suspension The night before the exam at 6 pm drink an 8-ounce glass every 15 minutes until the jug is half empty. If you arrive before 11 AM: Drink the other half of the Golytely jug at 11 PM night before procedure. If you arrive after 11 AM: Drink the other half of the Golytely jug at 6 AM day of procedure. For additional instructions refer to your colonoscopy prep instructions. 4000 mL 0      Family History:  Family History   Problem Relation Age of Onset    Hypertension Mother     Colon Cancer Mother     Cerebrovascular Disease Maternal Grandfather     Colon Cancer Paternal Grandfather    mother had colon cancer in her 60s.  Paternal grand father had rectal cancer  He has 3 kids- all healthy      Social History:  Social History     Socioeconomic History    Marital status:      Spouse name: Not on file    Number of children: Not on file    Years of education: Not on file    Highest education level: Not on file   Occupational History    Not on file   Tobacco Use    Smoking status: Every Day     Packs/day: 0.50     Years: 30.00     Additional pack years: 0.00     Total pack years: 15.00     Types: Cigarettes    Smokeless tobacco: Never    Tobacco comments:     Have smoked foe 30yrs and tried to quit 3 times.   Vaping Use    Vaping Use: Never used   Substance and Sexual Activity    Alcohol use: Not Currently     Comment: not for 5 years     Drug use: Not Currently     Comment: usage was back in my 20's and 30's.    Sexual activity: Not Currently     Partners: Female     Birth control/protection: None   Other Topics Concern    Parent/sibling w/ CABG, MI or angioplasty before 65F 55M? No   Social History Narrative    Not on file     Social Determinants of Health     Financial Resource Strain: Low Risk  (9/22/2023)    Financial Resource Strain     Within the past 12 months, have you  "or your family members you live with been unable to get utilities (heat, electricity) when it was really needed?: No   Food Insecurity: Low Risk  (9/22/2023)    Food Insecurity     Within the past 12 months, did you worry that your food would run out before you got money to buy more?: No     Within the past 12 months, did the food you bought just not last and you didn t have money to get more?: No   Transportation Needs: Low Risk  (9/22/2023)    Transportation Needs     Within the past 12 months, has lack of transportation kept you from medical appointments, getting your medicines, non-medical meetings or appointments, work, or from getting things that you need?: No   Physical Activity: Not on file   Stress: Not on file   Social Connections: Not on file   Interpersonal Safety: Low Risk  (9/29/2023)    Interpersonal Safety     Do you feel physically and emotionally safe where you currently live?: Yes     Within the past 12 months, have you been hit, slapped, kicked or otherwise physically hurt by someone?: No     Within the past 12 months, have you been humiliated or emotionally abused in other ways by your partner or ex-partner?: No   Housing Stability: Low Risk  (9/22/2023)    Housing Stability     Do you have housing? : Yes     Are you worried about losing your housing?: No       Smokes about 1/2 ppd and used to take 1.5 to 2 ppd  Last etoh was in 2017  Lives with wife and 2 kids.  Works at [employer]                                                                                                                                                                                          Physical Exam:  Ht 1.803 m (5' 11\")   Wt 105.8 kg (233 lb 3 oz)   BMI 32.52 kg/m      Wt Readings from Last 4 Encounters:   01/25/24 105.8 kg (233 lb 3 oz)   12/21/23 107.3 kg (236 lb 8 oz)   12/12/23 105.7 kg (233 lb)   10/17/23 105.4 kg (232 lb 6 oz)       Constitutional.  Looks well and in no apparent distress.   Eyes.  Without " eye redness or apparent jaundice.   Respiratory.  Non labored breathing. Speaking in full sentences.    Skin.  No concerning skin rashes on the skin visualized.   Neurological.  Is alert and oriented.  Psychiatric.  Mood and affect seem appropriate.      The rest of a comprehensive physical examination is deferred due to [facility] video visit restrictions.        Laboratory/Imaging Studies      Reviewed  12/20/2023  CBC was unremarkable.  Hemoglobin 16.8.    CEA 3.2.      9/15/2023  CEA 2.8    5/10/2023   CBC unremarkable.  CEA 2.8.      CMP unremarkable on 5/12/2023     CT abdomen/pelvis on 12/20/2023 was stable.    Abdomen and pelvis: Subcentimeter fluid attenuation cyst at the  junction of hepatic segments 2 and 4A. No new focal suspicious hepatic  lesion. Gallbladder fundal adenomyomatosis. No intra or extrahepatic  biliary dilation. Punctate calcification in the pancreatic head. No  main pancreatic ductal dilation or suspicious parenchymal lesion.  Normal spleen and left adrenal gland. Stable 1.1 cm right adrenal  nodule previously characterized as a benign adenoma. No renal cortical  mass. No hydronephrosis, hydroureter, or urinary tract stone. Normal  bladder. Mildly enlarged prostate. Symmetric cylindrical vesicles. No  free fluid or free air. No bowel obstruction or inflammation. Colonic  diverticulosis. Normal appendix. Stable prominent, not enlarged tamia  hepatis lymph nodes. No suspicious new or enlarging abdominal or  pelvic lymphadenopathy. Stable lobule of fat with peripheral  calcification anterior to the mid to distal left ureter. Mild  aortoiliac atherosclerotic calcification without aneurysmal dilation.  The major abdominal vasculature is patent. Retroaortic left renal  vein.     Lung bases/lower chest:  Clear.     Bones and soft tissues: No acute or aggressive osseous abnormality.                                                                      IMPRESSION:   1. No evidence of  metastatic disease in the abdomen or pelvis.  2. Stable prominent tamia hepatis lymph nodes, of low suspicion  particularly given ongoing stability.   3. Other chronic and incidental findings as detailed in the body of  the report.    ASSESSMENT/PLAN:      Stage I colon adenocarcinoma which was found in the background of sessile tubulovillous adenoma with high-grade dysplasia which was removed endoscopically on 3/17/2023.  No lymphovascular or perineural invasion or tumor budding.  All margins were negative.  Unable to determine MMR status.    There is slightly prominent tamia hepatis lymph nodes which is indeterminate.  No other evidence of metastatic disease.    He has benign hepatic cyst and right adrenal adenoma.    I had discussed with him that his chances of cure from current resection are high, although there is a small risk that cancer could have spread to pericolonic lymph nodes which have not been resected.  Our options include going ahead with a right hemicolectomy versus observation.      We had discussed pros and cons of each approach.  After a thorough discussion, he opted for watchful waiting.      Recent CT abdomen/pelvis on 12/20/2023 was stable.  Prominent periportal lymph nodes are also stable.  CT scan was done early because he felt some discomfort in the abdomen over the last few days.    He also had an episode of rectal bleeding.      He had colonoscopy on 1/17/2024 which showed 2 ascending colon adenomas. One is essentially recurrent adenoma at the EMR scar. Path showed tubular adenoma with foci of high-grade dysplasia but no invasive malignancy.      We again discussed the situation in detail.  It is unusual to have a recurrent adenoma at the previous EMR site as margins on the previous lesions were negative.    We again discussed that surgery offers the best chance of a cure.    Personally I would recommend surgery as the first option. Otherwise he will need very close monitoring with repeat  colonoscopy surveillance in 1 year.    He is leaning towards close surveillance right now but he wants to discuss the situation with his wife before making a final decision.    If he continues with the observation route then I would like to see him in 1 year with CT and colonoscopy prior      Discussion regarding smoking.  Trying to cut down on smoking. Currently smoking a little over half a pack per day.  Congratulated him on his efforts in cutting down smoking.  Encouraged to keep working hard to completely stop this in the near future. I again offered him referral to smoking cessation program but he does not want this.       We did not address the following today    Genetic testing.  It did not reveal any high risk mutations.  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  He is agreeable and comfortable with the plan.    Mark Leslie MD

## 2024-02-01 ENCOUNTER — TELEPHONE (OUTPATIENT)
Dept: DERMATOLOGY | Facility: CLINIC | Age: 54
End: 2024-02-01
Payer: COMMERCIAL

## 2024-02-01 NOTE — TELEPHONE ENCOUNTER
Chart reviewed.  I don't see any documentation in prior records of a skin cancer or even an AK on his neck.  He has an untreated skin cancer on his lip.  Will evaluate lesion on neck at Monday's appt and address scheduling Mohs for right upper lip.    Paris Terry RN

## 2024-02-01 NOTE — TELEPHONE ENCOUNTER
Pt called in to the Derm Surg scheduling line this morning because there is a previously untreated skin cancer on his neck.     Ryan had Mohs with Dr. Fong in 2022 for the site on his eyelid, but did not follow through with treating this site. Pt had originally put off having this site removed because he has been focused on treating his colon cancer.     I don't see a path report for the neck.    Recently, the site has gotten red an inflamed and is very sore. Ryan nicked it while shaving the other day and it got even more inflamed and painful.     Pt said that he can not show the site well in a photo and would like to come in to the office. I offered pt an appt on Monday afternoon where a procedure had recently been cancelled.      I told Ryan that I was documenting our conversation and routing it to Dr. Fong and his medical team in  to review. He is not expecting a call before the Monday appt, but he is aware that someone may reach out if they have additional questions.     Sandra Toth, Procedure  2/1/2024 10:05 AM

## 2024-02-03 ENCOUNTER — OFFICE VISIT (OUTPATIENT)
Dept: URGENT CARE | Facility: URGENT CARE | Age: 54
End: 2024-02-03
Payer: COMMERCIAL

## 2024-02-03 VITALS
BODY MASS INDEX: 32.68 KG/M2 | HEART RATE: 105 BPM | OXYGEN SATURATION: 96 % | TEMPERATURE: 99.3 F | DIASTOLIC BLOOD PRESSURE: 99 MMHG | WEIGHT: 234.3 LBS | RESPIRATION RATE: 16 BRPM | SYSTOLIC BLOOD PRESSURE: 167 MMHG

## 2024-02-03 DIAGNOSIS — L02.11 ABSCESS OF NECK: Primary | ICD-10-CM

## 2024-02-03 PROCEDURE — 99213 OFFICE O/P EST LOW 20 MIN: CPT | Performed by: EMERGENCY MEDICINE

## 2024-02-03 RX ORDER — DOXYCYCLINE 100 MG/1
100 CAPSULE ORAL 2 TIMES DAILY
Qty: 14 CAPSULE | Refills: 0 | Status: SHIPPED | OUTPATIENT
Start: 2024-02-03 | End: 2024-02-10

## 2024-02-03 RX ORDER — TADALAFIL 10 MG/1
10 TABLET ORAL DAILY PRN
COMMUNITY
End: 2024-04-05

## 2024-02-03 NOTE — PROGRESS NOTES
Assessment & Plan     Diagnosis:    ICD-10-CM    1. Abscess of neck  L02.11 doxycycline hyclate (VIBRAMYCIN) 100 MG capsule          Medical Decision Making:  Ryan Briseno is a 53 year old male with history of colon cancer who presents with left sided neck pain and lump.    The patient has signs of a cellulitis/early abscess. No fluctuance or areas of pointing on exam; discussed I&D vs wait and see antibiotics and patient elects for the latter.  I believe this is reasonable, especially in the setting of him having nodule here prior and seeing his dermatologist on Monday for further evaluation, they can biopsy or I&D at that time if indicated.  No concerning signs/sx's to suggest needs IV abx and admission.  Plan outpt treatment w/ Doxycycline to cover common skin organisms and MRSA.  Warning signs for wound given on discharge instructions and verbally; see discharge instructions.  Pt agrees with antibiotics and waiting to see resolution vs abscess formation.    Jhony Kaur PA-C  [facility]    Subjective     Ryan Briseno is a 53 year old male who presents to clinic today for the following health issues:  Chief Complaint   Patient presents with    Boil     Boil on left side of neck from shaving. Hx of skin cancer. Previous colon cancer removed and just realized that it came back.        HPI  Patient notes that he had a cyst or boil in the left side of his neck for a while, was just a small little lump that was very hard.  He has only nicked it shaving a few days ago and it got a lot larger now, more painful, red.  Has not noticed any drainage from it, pockets of pus or feeling like it is as it.  He did some warm and cold compresses with no improvement.  She is dermatologist on Monday for this as he does have a history of skin cancer and known to have this biopsy.  He denies any fevers, difficulty swallowing or breathing or other concerns.    Review of Systems    See HPI    Objective       Vitals: BP (!) 167/99   Pulse 105   Temp 99.3  F (37.4  C) (Tympanic)   Resp 16   Wt 106.3 kg (234 lb 4.8 oz)   SpO2 96%   BMI 32.68 kg/m        Patient Vitals for the past 24 hrs:   BP Temp Temp src Pulse Resp SpO2 Weight   02/03/24 0917 (!) 167/99 99.3  F (37.4  C) Tympanic 105 16 96 % 106.3 kg (234 lb 4.8 oz)       Vital signs reviewed by: Jhony Kaur PA-C    Physical Exam   Constitutional: Patient is alert and cooperative. No acute distress.  Mouth: Mucous membranes are moist. Normal tongue and tonsil. Posterior oropharynx is clear.  Cardiovascular: Regular rate and rhythm  Pulmonary/Chest: Lungs are clear to auscultation throughout. Effort normal. No respiratory distress. No wheezes, rales or rhonchi.  Neurological: Alert and oriented x3.   Skin: Left side of neck with a ~2cm ovoid erythematous lump. Feels indurated and is tender to palpation. No fluctuance or areas of pointing.  No anterior cervical lymphadenopathy noted.  Psychiatric:The patient has a normal mood and affect.       Jhony Kaur PA-C, February 3, 2024

## 2024-02-05 ENCOUNTER — OFFICE VISIT (OUTPATIENT)
Dept: DERMATOLOGY | Facility: CLINIC | Age: 54
End: 2024-02-05
Payer: COMMERCIAL

## 2024-02-05 DIAGNOSIS — L72.0 INFECTED EPIDERMOID CYST: Primary | ICD-10-CM

## 2024-02-05 DIAGNOSIS — Z85.828 HISTORY OF BASAL CELL CARCINOMA (BCC): ICD-10-CM

## 2024-02-05 DIAGNOSIS — L08.9 INFECTED EPIDERMOID CYST: Primary | ICD-10-CM

## 2024-02-05 DIAGNOSIS — C44.01 BASAL CELL CARCINOMA (BCC) OF SKIN OF RIGHT UPPER LIP: ICD-10-CM

## 2024-02-05 PROCEDURE — 10060 I&D ABSCESS SIMPLE/SINGLE: CPT | Performed by: DERMATOLOGY

## 2024-02-05 PROCEDURE — 11900 INJECT SKIN LESIONS </W 7: CPT | Mod: 59 | Performed by: DERMATOLOGY

## 2024-02-05 PROCEDURE — 99212 OFFICE O/P EST SF 10 MIN: CPT | Mod: 25 | Performed by: DERMATOLOGY

## 2024-02-05 RX ORDER — DOXYCYCLINE 100 MG/1
100 CAPSULE ORAL 2 TIMES DAILY
Qty: 14 CAPSULE | Refills: 0 | Status: SHIPPED | OUTPATIENT
Start: 2024-02-05 | End: 2024-03-04

## 2024-02-05 RX ORDER — TRIAMCINOLONE ACETONIDE 40 MG/ML
10 INJECTION, SUSPENSION INTRA-ARTICULAR; INTRAMUSCULAR ONCE
Status: COMPLETED | OUTPATIENT
Start: 2024-02-05 | End: 2024-02-19

## 2024-02-05 RX ADMIN — TRIAMCINOLONE ACETONIDE 10 MG: 40 INJECTION, SUSPENSION INTRA-ARTICULAR; INTRAMUSCULAR at 11:30

## 2024-02-05 NOTE — NURSING NOTE
Ryan Briseno's goals for this visit include:   Chief Complaint   Patient presents with    Derm Problem     Inflamed lesion on neck. Follow up on untreated skin cancer on right upper lip.       He requests these members of his care team be copied on today's visit information:     PCP: Mary Cline    Referring Provider:  No referring provider defined for this encounter.    There were no vitals taken for this visit.    Do you need any medication refills at today's visit?       Penny Frances EMT    The following medication was given:     MEDICATION:  Lidocaine with epinephrine 1% [id number]  ROUTE: SQ  SITE: see procedure note  DOSE: 3mls  LOT #: [id number]  : [employer]  EXPIRATION DATE: 4/30/2025  NDC#: 36350-503-36  Was there drug waste? no  Multi-dose vial: Yes    Penny Frances  February 5, 2024

## 2024-02-05 NOTE — PROGRESS NOTES
[facility] Dermatology Note  Encounter Date: Feb 5, 2024  Office Visit     Dermatology Problem List:  1. BCC, right upper lip. Schedule Mohs (patient has deferred in spite of written communication risk letter).       BCC, R medial lower eyelid, s/p Mohs and rhombic flap 1/31/22    2. Acrochordon left upper eyelid              LN2 today  3. Rosacea, papular pustular type.  4. Infected EIC, L neck   - I&D and ILK 20 mg/ml preformed 2/5/24  - Doxycycline 100 mg twice daily x 14 days    ____________________________________________    Assessment & Plan:    # Infected EIC, L neck  - Discussed option of I&D and treatment with ILK. Risks/benefits were discussed.   - Informed patient we do not typically excise inflamed or infected cysts.   - Patient prescribed doxycycline 100 mg for 7 days at Urgent Care and instructed to continue for another 7 days, for a 14 day course. Prescription sent today      # BCC, R upper lip, untreated  - Considering Mohs     # Bleb, R medial canthus, sequelae of treating BCC on right medial lower eyelid 1/2022   - Patient would like to schedule scar revision for right medial lower eyelid    Procedures Performed:   - Incision and Drainage (I&D). After discussion of the risks including bleeding, infection, scar, non diagnosis and skin discoloration, verbal and/or written consent was obtained. The area was prepped with alcohol and 6 mL of lidocaine with epi (1:100,000) was injected into the lesion on the L neck.  An 11 blade was used to incise the lesion and the lesion was drained. A dressing was placed.  The patient tolerated the procedure well and left the dermatology clinic in good condition.     - Intra-lesional triamcinolone procedure note. After verbal consent and review of risk of pain and skin thinning/atrophy, positioning and cleansing with isopropyl alcohol, *0.5  total mL of triamcinolone 20 mg/mL was injected into 1 lesion(s) on the L neck. The patient tolerated the  procedure well and left the dermatology clinic in good condition.    Follow-up: March 2024    Staff and Scribe:     I, KULDEEP MARSHALL, am serving as a scribe; to document services personally performed by Yordy Fong MD -based on data collection and the provider's statements to me.    Provider Disclosure:   The documentation recorded by the scribe accurately reflects the services I personally performed and the decisions made by me. I personally performed the procedures today.      Yordy Fong DO    Department of Dermatology  [facility]: Phone: [phone], Fax:[phone]  [facility]: Phone: [phone], Fax: [phone]    ____________________________________________    CC: Derm Problem (Inflamed lesion on neck. Follow up on untreated skin cancer on right upper lip.)    HPI:  Mr. Ryan Briseno is a(n) 53 year old male who presents today as a return patient for follow up. Patient was last seen virtually on 5/9/22 for scar evaluation of BCC on R medial lower eyelid.     Patient brings attention to a lesion on his neck that he is unsure if worrisome. Per patient, he first noticed a hard white nodule about one month ago. States he knicked it with a razor on Wednesday, which caused the lesion to bleed and became sore. Notes it did grow to the size of a golf ball and was red. The lesion did release some discharge of blood and pus on Saturday. He was seen at Urgent Care and was prescribed doxycycline 100 mg twice daily for 7 days and has since taken 3 doses. Notes the lesion has improved in appearance since beginning the doxycycline.     Additionally patient is interested in scar revision of R medial lower eyelid.     States he was recently diagnosed with colon cancer.     Patient is otherwise feeling well, without additional skin concerns.    Labs Reviewed:  N/A    Physical Exam:  Vitals:  There were no vitals taken for this visit.  SKIN: Focused examination of face and neck was performed.  - 3.5 x 2.0 cm nodule with central ulceration and fibrinous material on the left neck    - No other lesions of concern on areas examined.     Medications:  Current Outpatient Medications   Medication    doxycycline hyclate (VIBRAMYCIN) 100 MG capsule    tadalafil (CIALIS) 10 MG tablet    bisacodyl (DULCOLAX) 5 MG EC tablet    polyethylene glycol (GOLYTELY) 236 g suspension     No current facility-administered medications for this visit.      Past Medical History:   Patient Active Problem List   Diagnosis    Elevated BP without diagnosis of hypertension    Morbid obesity (H)    Smoker    Malignant neoplasm of colon, unspecified part of colon (H)     Past Medical History:   Diagnosis Date    Hypertension     Malignant neoplasm of colon, unspecified part of colon (H) 9/29/2023        CC No referring provider defined for this encounter. on close of this encounter.

## 2024-02-07 ENCOUNTER — PATIENT OUTREACH (OUTPATIENT)
Dept: ONCOLOGY | Facility: CLINIC | Age: 54
End: 2024-02-07
Payer: COMMERCIAL

## 2024-02-07 NOTE — TELEPHONE ENCOUNTER
"----- Message from Yolanda Yuan RN sent at 2/5/2024  8:35 AM CST -----  Regarding: FW: Cancelled fu    ----- Message -----  From: Yolanda Yuan RN  Sent: 2/5/2024  12:00 AM CST  To: Yolanda Yuan RN  Subject: FW: Cancelled fu                                   ----- Message -----  From: Jessica Regalado RN  Sent: 2/1/2024  12:00 AM CST  To: Yolanda Yuan RN  Subject: FW: Cancelled fu                                   ----- Message -----  From: Mark Leslie MD  Sent: 1/31/2024   7:48 AM CST  To: Tito Mcconnell; Yolanda Yuan RN  Subject: RE: Cancelled fu                                 Che, please follow up with him in a week and see where he is at so that we can decide about the follow up      ----- Message -----  From: Tito Mcconnell  Sent: 1/30/2024   4:40 PM CST  To: Mark Leslie MD; Yolanda Yuan RN  Subject: Cancelled fu                                     Mark,     Patient cancelled follow-up for March and I asked him to call back and reschedule. He reached out and seems to be at a standstill in deciding what to do next. He says he has a lot of questions still about what might be next, but doesn't \"want to bother you\". I believe he just had his colonoscopy and he told you he'd reach out with a decision soon after, however he's still not sure.     Would you like to set up a follow-up to allow him to ask some more questions? Or how would you like to proceed?    Thanks,  Ant"

## 2024-02-07 NOTE — PROGRESS NOTES
"Spoke to patient via telephone regarding below message.    Patient reportedly has had basal cancer in eye, lip and neck. Last Wednesday he knicked a \"ball\" in his neck and it got infected. He saw dermatologist and they drained it. In a month he is going to have this \"ball\" removed.  He would like to take care of this issue and then touch base about follow-up with Dr. Leslie.  He will send a message to us in 2 months once he has recovered from infection/surgery.  Will notify Dr. Leslie.     Che Yuan, RN, BSN  RN Care Coordinator - Oncology/Hematology  [facility]    "

## 2024-03-04 ENCOUNTER — VIRTUAL VISIT (OUTPATIENT)
Dept: DERMATOLOGY | Facility: CLINIC | Age: 54
End: 2024-03-04
Payer: COMMERCIAL

## 2024-03-04 DIAGNOSIS — L72.0 EPIDERMAL CYST: Primary | ICD-10-CM

## 2024-03-04 PROCEDURE — 99207 PR NO CHARGE LOS: CPT | Mod: 93 | Performed by: DERMATOLOGY

## 2024-03-04 NOTE — PROGRESS NOTES
[facility] Dermatology Note  Encounter Date: Mar 4, 2024  Store-and-Forward and Telephone. Location of teledermatologist: [facility].  Start time: 11:15. End time: 11:20.    Dermatology Problem List:  1. BCC, right upper lip. Schedule Mohs (patient has deferred in spite of written communication risk letter).       BCC, R medial lower eyelid, s/p Mohs and rhombic flap 1/31/22    2. Acrochordon left upper eyelid              LN2 today  3. Rosacea, papular pustular type.  4. Infected EIC, L neck   - I&D and ILK 20 mg/ml preformed 2/5/24  - Doxycycline 100 mg twice daily x 14 days  5. Recurrent Colon Adenocarcinoma   ____________________________________________    Assessment & Plan:    # Epidermal cyst status post I&D and ILK injection on 2/5/2024.   -Cyst is no longer symptomatic for patient.  We can continue with observation at this time.  We did discuss permanent removal via wide local excision.  At this time, patient would like to continue with observation as he has other health concerns that are taking precedent at this time.  Patient will let us know if lesion becomes bothersome in the future.    Staff and Scribe:     Patient was discussed with and evaluated by attending physician Dr. Fong.     Mahsa Cheng PA-C  [facility]  Dermatology     Staff Physician Comments:   I saw the photos and evaluated the patient with the fellow physician assistant (Mahsa Cheng PA-C) and I agree with the assessment and plan. I was present for the key portions of the telephone call.    Yordy Fong DO    Department of Dermatology  [facility]: Phone: [phone], Fax:[phone]  [facility]: Phone: [phone], Fax: [phone]    ____________________________________________    CC: Cyst (Follow up regarding cyst on left neck.  Patient completed course  of doxycycline.  He denies bleeding, drainage, inflammation, pain.  He also reports another cyst on the back of his neck.  )    HPI:  Mr. Ryan Briseno is a(n) 53 year old male who presents today as a return patient for follow-up after excision and drainage and IL K injection of an inflamed cyst on 2/5/2024.  In addition, patient was on a 14-day course of oral doxycycline.  Over the past month, the cyst has healed well and is no longer painful or bothersome to patient.  He does note a another cyst just adjacent to this previous cyst.  He has no other concerns today    Patient is otherwise feeling well, without additional skin concerns.    Labs Reviewed:  N/A    Physical Exam:  Vitals: There were no vitals taken for this visit.  SKIN: Focused examination of of patient submitted photograph was performed.  -   -Left lateral neck, erythematous patch  - No other lesions of concern on areas examined.     Medications:  Current Outpatient Medications   Medication    tadalafil (CIALIS) 10 MG tablet     No current facility-administered medications for this visit.      Past Medical History:   Patient Active Problem List   Diagnosis    Elevated BP without diagnosis of hypertension    Morbid obesity (H)    Smoker    Malignant neoplasm of colon, unspecified part of colon (H)     Past Medical History:   Diagnosis Date    Hypertension     Malignant neoplasm of colon, unspecified part of colon (H) 9/29/2023

## 2024-03-04 NOTE — LETTER
3/4/2024         RE: Ryan Briseno  [address]  [place] [address]        Dear Colleague,    Thank you for referring your patient, Ryan Briseno, to the [facility]. Please see a copy of my visit note below.    [facility] Dermatology Note  Encounter Date: Mar 4, 2024  Store-and-Forward and Telephone. Location of teledermatologist: [facility].  Start time: 11:15. End time: 11:20.    Dermatology Problem List:  1. BCC, right upper lip. Schedule Mohs (patient has deferred in spite of written communication risk letter).       BCC, R medial lower eyelid, s/p Mohs and rhombic flap 1/31/22    2. Acrochordon left upper eyelid              LN2 today  3. Rosacea, papular pustular type.  4. Infected EIC, L neck   - I&D and ILK 20 mg/ml preformed 2/5/24  - Doxycycline 100 mg twice daily x 14 days  5. Recurrent Colon Adenocarcinoma   ____________________________________________    Assessment & Plan:    # Epidermal cyst status post I&D and ILK injection on 2/5/2024.   -Cyst is no longer symptomatic for patient.  We can continue with observation at this time.  We did discuss permanent removal via wide local excision.  At this time, patient would like to continue with observation as he has other health concerns that are taking precedent at this time.  Patient will let us know if lesion becomes bothersome in the future.    Staff and Scribe:     Patient was discussed with and evaluated by attending physician Dr. Fong.     Mahsa Cheng PA-C  [facility]  Dermatology     Staff Physician Comments:   I saw the photos and evaluated the patient with the fellow physician assistant (Mahsa Cheng PA-C) and I agree with the assessment and plan. I was present for the key portions of the telephone call.    Yordy Fong DO    Department of Dermatology  [facility]: Phone:  [phone], Fax:[phone]  [facility]: Phone: [phone], Fax: [phone]    ____________________________________________    CC: Cyst (Follow up regarding cyst on left neck.  Patient completed course of doxycycline.  He denies bleeding, drainage, inflammation, pain.  He also reports another cyst on the back of his neck.  )    HPI:  Mr. Ryan Briseno is a(n) 53 year old male who presents today as a return patient for follow-up after excision and drainage and IL K injection of an inflamed cyst on 2/5/2024.  In addition, patient was on a 14-day course of oral doxycycline.  Over the past month, the cyst has healed well and is no longer painful or bothersome to patient.  He does note a another cyst just adjacent to this previous cyst.  He has no other concerns today    Patient is otherwise feeling well, without additional skin concerns.    Labs Reviewed:  N/A    Physical Exam:  Vitals: There were no vitals taken for this visit.  SKIN: Focused examination of of patient submitted photograph was performed.  -   -Left lateral neck, erythematous patch  - No other lesions of concern on areas examined.     Medications:  Current Outpatient Medications   Medication     tadalafil (CIALIS) 10 MG tablet     No current facility-administered medications for this visit.      Past Medical History:   Patient Active Problem List   Diagnosis     Elevated BP without diagnosis of hypertension     Morbid obesity (H)     Smoker     Malignant neoplasm of colon, unspecified part of colon (H)     Past Medical History:   Diagnosis Date     Hypertension      Malignant neoplasm of colon, unspecified part of colon (H) 9/29/2023            Again, thank you for allowing me to participate in the care of your patient.        Sincerely,        Yordy Fong MD

## 2024-03-04 NOTE — NURSING NOTE
Teledermatology Nurse Call Patients:     Are you in the [facility] at the time of the encounter? yes    Today's visit will be billed to you and your insurance.    A teledermatology visit is not as thorough as an in-person visit and the quality of the photograph sent may not be of the same quality as that taken by the dermatology clinic.    Paris Terry RN

## 2024-03-29 SDOH — HEALTH STABILITY: PHYSICAL HEALTH: ON AVERAGE, HOW MANY DAYS PER WEEK DO YOU ENGAGE IN MODERATE TO STRENUOUS EXERCISE (LIKE A BRISK WALK)?: 6 DAYS

## 2024-03-29 SDOH — HEALTH STABILITY: PHYSICAL HEALTH: ON AVERAGE, HOW MANY MINUTES DO YOU ENGAGE IN EXERCISE AT THIS LEVEL?: 30 MIN

## 2024-03-29 ASSESSMENT — SOCIAL DETERMINANTS OF HEALTH (SDOH): HOW OFTEN DO YOU GET TOGETHER WITH FRIENDS OR RELATIVES?: ONCE A WEEK

## 2024-04-05 ENCOUNTER — OFFICE VISIT (OUTPATIENT)
Dept: FAMILY MEDICINE | Facility: CLINIC | Age: 54
End: 2024-04-05
Attending: FAMILY MEDICINE
Payer: COMMERCIAL

## 2024-04-05 VITALS
OXYGEN SATURATION: 95 % | SYSTOLIC BLOOD PRESSURE: 130 MMHG | RESPIRATION RATE: 16 BRPM | TEMPERATURE: 98.7 F | WEIGHT: 233.6 LBS | HEART RATE: 77 BPM | BODY MASS INDEX: 32.7 KG/M2 | DIASTOLIC BLOOD PRESSURE: 72 MMHG | HEIGHT: 71 IN

## 2024-04-05 DIAGNOSIS — Z12.5 PROSTATE CANCER SCREENING: ICD-10-CM

## 2024-04-05 DIAGNOSIS — C18.9 MALIGNANT NEOPLASM OF COLON, UNSPECIFIED PART OF COLON (H): ICD-10-CM

## 2024-04-05 DIAGNOSIS — Z13.220 SCREENING CHOLESTEROL LEVEL: ICD-10-CM

## 2024-04-05 DIAGNOSIS — Z13.1 DIABETES MELLITUS SCREENING: ICD-10-CM

## 2024-04-05 DIAGNOSIS — Z00.00 ROUTINE GENERAL MEDICAL EXAMINATION AT A HEALTH CARE FACILITY: Primary | ICD-10-CM

## 2024-04-05 PROBLEM — F17.200 SMOKER: Status: RESOLVED | Noted: 2022-03-15 | Resolved: 2024-04-05

## 2024-04-05 PROBLEM — E66.01 MORBID OBESITY (H): Status: RESOLVED | Noted: 2022-03-15 | Resolved: 2024-04-05

## 2024-04-05 PROCEDURE — 99396 PREV VISIT EST AGE 40-64: CPT | Performed by: FAMILY MEDICINE

## 2024-04-05 PROCEDURE — 80061 LIPID PANEL: CPT | Performed by: FAMILY MEDICINE

## 2024-04-05 PROCEDURE — 82947 ASSAY GLUCOSE BLOOD QUANT: CPT | Performed by: FAMILY MEDICINE

## 2024-04-05 PROCEDURE — 36415 COLL VENOUS BLD VENIPUNCTURE: CPT | Performed by: FAMILY MEDICINE

## 2024-04-05 PROCEDURE — G0103 PSA SCREENING: HCPCS | Performed by: FAMILY MEDICINE

## 2024-04-05 ASSESSMENT — PAIN SCALES - GENERAL: PAINLEVEL: NO PAIN (0)

## 2024-04-05 NOTE — PROGRESS NOTES
"Preventive Care Visit  [facility] KRISHNA Hernandez Bernardino Cline MD, Family Medicine  Apr 5, 2024      Assessment & Plan     Routine general medical examination at a health care facility  Routine health maintenance otherwise up-to-date.  Staying very physically active and eating well.  Home blood pressure readings are doing great.  Has quit smoking.    Malignant neoplasm of colon, unspecified part of colon (H)  Reviewed interval history.  Had another CT scan and CEA that were totally negative.  Colonoscopy showed some return of polyps including dysplastic adenoma at the previous site.  So the decision is still between hemicolectomy and monitoring and patient is opting for monitoring currently    Screening cholesterol level    - Lipid panel reflex to direct LDL Non-fasting; Future    Diabetes mellitus screening    - Glucose; Future    Prostate cancer screening    - Prostate Specific Antigen Screen; Future        BMI  Estimated body mass index is 32.58 kg/m  as calculated from the following:    Height as of this encounter: 1.803 m (5' 11\").    Weight as of this encounter: 106 kg (233 lb 9.6 oz).   Weight management plan: Discussed healthy diet and exercise guidelines    Counseling  Appropriate preventive services were discussed with this patient, including applicable screening as appropriate for fall prevention, nutrition, physical activity, Tobacco-use cessation, weight loss and cognition.  Checklist reviewing preventive services available has been given to the patient.  Reviewed patient's diet, addressing concerns and/or questions.   He is at risk for psychosocial distress and has been provided with information to reduce risk.       Work on weight loss  Regular exercise  See Patient Instructions    Dimitri Davis is a 53 year old, presenting for the following:  Physical (Follow up colonoscopy and CT scan/)        4/5/2024     3:18 PM   Additional Questions   Roomed by Renee   Accompanied by self "        Health Care Directive  Patient does not have a Health Care Directive or Living Will: Discussed advance care planning with patient; however, patient declined at this time.    HPI  Here today for routine checkup        3/29/2024   General Health   How would you rate your overall physical health? Good   Feel stress (tense, anxious, or unable to sleep) Only a little   (!) STRESS CONCERN      3/29/2024   Nutrition   Three or more servings of calcium each day? Yes   Diet: Other   If other, please elaborate: Try to limit sugar intake as much as possible. Eat protein and fiber bars in morning. Eat bannanas, oranges, and healthier deli meat instead of prossesed meat for lunch. Eat chicken, beef, eggs, vegatables or salads for dinner.   How many servings of fruit and vegetables per day? (!) 2-3   How many sweetened beverages each day? 0-1         3/29/2024   Exercise   Days per week of moderate/strenous exercise 6 days   Average minutes spent exercising at this level 30 min         3/29/2024   Social Factors   Frequency of gathering with friends or relatives Once a week   Worry food won't last until get money to buy more No   Food not last or not have enough money for food? No   Do you have housing?  Yes   Are you worried about losing your housing? No   Lack of transportation? No   Unable to get utilities (heat,electricity)? No         3/29/2024   Fall Risk   Fallen 2 or more times in the past year? No   Trouble with walking or balance? No          3/29/2024   Dental   Dentist two times every year? Yes         3/29/2024   TB Screening   Were you born outside of the [place]? No         Today's PHQ-2 Score:       4/4/2024     4:22 PM   PHQ-2 ( 1999 [employer])   Q1: Little interest or pleasure in doing things 0   Q2: Feeling down, depressed or hopeless 0   PHQ-2 Score 0   Q1: Little interest or pleasure in doing things Not at all   Q2: Feeling down, depressed or hopeless Not at all   PHQ-2 Score 0           3/29/2024   Substance  Use   If I could quit smoking, I would Completely agree   I want to quit somking, worry about health affects Completely agree   Willing to make a plan to quit smoking Completely agree   Willing to cut down before quitting Completely agree   Alcohol more than 3/day or more than 7/wk Not Applicable   Do you use any other substances recreationally? No     Social History     Tobacco Use    Smoking status: Former     Packs/day: 0.50     Years: 30.00     Additional pack years: 0.00     Total pack years: 15.00     Types: Cigarettes    Smokeless tobacco: Never    Tobacco comments:     Quit smoking for 3.5 weeks    Vaping Use    Vaping Use: Some days    Substances: Nicotine   Substance Use Topics    Alcohol use: Not Currently     Comment: not for 5 years     Drug use: Not Currently     Types: Cocaine, Marijuana     Comment: usage was back in my 20's and 30's.           3/29/2024   STI Screening   New sexual partner(s) since last STI/HIV test? No   ASCVD Risk   The 10-year ASCVD risk score (Joana GARNER, et al., 2019) is: 5.6%    Values used to calculate the score:      Age: 53 years      Sex: Male      Is Non- : No      Diabetic: No      Tobacco smoker: No      Systolic Blood Pressure: 130 mmHg      Is BP treated: No      HDL Cholesterol: 28 mg/dL      Total Cholesterol: 157 mg/dL           Reviewed and updated as needed this visit by Provider   Tobacco  Allergies  Meds  Problems  Med Hx  Surg Hx  Fam Hx            Lab work is in process  Labs reviewed in EPIC  BP Readings from Last 3 Encounters:   04/05/24 130/72   02/03/24 (!) 167/99   10/17/23 (!) 136/91    Wt Readings from Last 3 Encounters:   04/05/24 106 kg (233 lb 9.6 oz)   02/03/24 106.3 kg (234 lb 4.8 oz)   01/25/24 105.8 kg (233 lb 3 oz)                      Review of Systems  CONSTITUTIONAL: NEGATIVE for fever, chills, change in weight  INTEGUMENTARY/SKIN: NEGATIVE for worrisome rashes, moles or lesions  EYES: NEGATIVE for  "vision changes or irritation  ENT/MOUTH: NEGATIVE for ear, mouth and throat problems  RESP: NEGATIVE for significant cough or SOB  BREAST: NEGATIVE for masses, tenderness or discharge  CV: NEGATIVE for chest pain, palpitations or peripheral edema  GI: NEGATIVE for nausea, abdominal pain, heartburn, or change in bowel habits  : NEGATIVE for frequency, dysuria, or hematuria  MUSCULOSKELETAL: NEGATIVE for significant arthralgias or myalgia  NEURO: NEGATIVE for weakness, dizziness or paresthesias  ENDOCRINE: NEGATIVE for temperature intolerance, skin/hair changes  HEME: NEGATIVE for bleeding problems  PSYCHIATRIC: NEGATIVE for changes in mood or affect     Objective    Exam  /72   Pulse 77   Temp 98.7  F (37.1  C) (Temporal)   Resp 16   Ht 1.803 m (5' 11\")   Wt 106 kg (233 lb 9.6 oz)   SpO2 95%   BMI 32.58 kg/m     Estimated body mass index is 32.58 kg/m  as calculated from the following:    Height as of this encounter: 1.803 m (5' 11\").    Weight as of this encounter: 106 kg (233 lb 9.6 oz).    Physical Exam  GENERAL: alert and no distress  EYES: Eyes grossly normal to inspection, PERRL and conjunctivae and sclerae normal  HENT: ear canals and TM's normal, nose and mouth without ulcers or lesions  NECK: no adenopathy, no asymmetry, masses, or scars  RESP: lungs clear to auscultation - no rales, rhonchi or wheezes  CV: regular rate and rhythm, normal S1 S2, no S3 or S4, no murmur, click or rub, no peripheral edema  ABDOMEN: soft, nontender, no hepatosplenomegaly, no masses and bowel sounds normal  MS: no gross musculoskeletal defects noted, no edema  SKIN: no suspicious lesions or rashes  NEURO: Normal strength and tone, mentation intact and speech normal  PSYCH: mentation appears normal, affect normal/bright        Signed Electronically by: Mary Cline MD    "

## 2024-04-06 LAB
CHOLEST SERPL-MCNC: 154 MG/DL
FASTING STATUS PATIENT QL REPORTED: NO
FASTING STATUS PATIENT QL REPORTED: NO
GLUCOSE SERPL-MCNC: 88 MG/DL (ref 70–99)
HDLC SERPL-MCNC: 32 MG/DL
LDLC SERPL CALC-MCNC: 93 MG/DL
NONHDLC SERPL-MCNC: 122 MG/DL
PSA SERPL DL<=0.01 NG/ML-MCNC: 0.82 NG/ML (ref 0–3.5)
TRIGL SERPL-MCNC: 147 MG/DL

## 2024-04-08 ENCOUNTER — PATIENT OUTREACH (OUTPATIENT)
Dept: GASTROENTEROLOGY | Facility: CLINIC | Age: 54
End: 2024-04-08
Payer: COMMERCIAL

## 2024-06-24 ENCOUNTER — TELEPHONE (OUTPATIENT)
Dept: DERMATOLOGY | Facility: CLINIC | Age: 54
End: 2024-06-24
Payer: COMMERCIAL

## 2024-06-24 NOTE — TELEPHONE ENCOUNTER
[facility] Call Center    Phone Message    May a detailed message be left on voicemail: yes     Reason for Call: Other: Pt states he sent an email - not in Mychart with pics, regarding a new cyst. He is leaving for vacation and would to be seen prior to leaving. Please call back to discuss. Thank you.      Action Taken: Message routed to:  Adult Clinics: Dermatology p [id number]    Travel Screening: Not Applicable     Date of Service:

## 2024-06-25 ENCOUNTER — MYC MEDICAL ADVICE (OUTPATIENT)
Dept: DERMATOLOGY | Facility: CLINIC | Age: 54
End: 2024-06-25
Payer: COMMERCIAL

## 2024-06-25 NOTE — TELEPHONE ENCOUNTER
Pt is calling again to get scheduled, he has a trip on Saturday and needs to be seen before then, please call back or Narval[employer] message works great as well.

## 2024-06-25 NOTE — TELEPHONE ENCOUNTER
Called the patient and made the patient an appointment with  for Aug 15th. Writer offered patient appointments with PA's and other providers to get in sooner but patient wanted to only see doctor dominick and was willing to wait. Patient told writer that if it got any more painful or red that he would go to the ER or urgent care to get antibiotics. Patient is leaving town for the weekend.       Penny MASSEY

## 2024-08-13 NOTE — PROGRESS NOTES
DERMATOLOGY EXCISION PROCEDURE NOTE    Dermatology Problem List:  1. BCC, R upper lip. Schedule Mohs (patient has deferred in spite of written communication risk letter).       BCC, R medial lower eyelid, s/p Mohs and rhombic flap 1/31/22    2. Acrochordon left upper eyelid              LN2 today  3. Rosacea, papular pustular type.  4. Inflamed EIC, L neck, s/p excision 8/15/24  - I&D and ILK 20 mg/ml preformed 2/5/24  - Doxycycline 100 mg twice daily x 14 days  5. Recurrent Colon Adenocarcinoma   ____________________________________________       NAME OF PROCEDURE: Excision intermediate layered linear closure  Staff surgeon: Yordy Fong DO  Fellow: Meño Vazquez MD  Scrub Nurse: RICK Yeh    PRE-OPERATIVE DIAGNOSIS:  cyst  POST-OPERATIVE DIAGNOSIS: Same   LOCATION: left posterior neck  FINAL EXCISION SIZE(DEFECT SIZE): 2.7x1.2 cm  MARGIN: 0.1 cm  FINAL REPAIR LENGTH: 4.9 cm   ANESTHESIA: 12mL 1% lidocaine with 1:100,000 epinephrine    INDICATIONS: This patient presented with a 2.5x1.0cm cyst. Excision was indicated. We discussed the principles of treatment and most likely complications including scarring, bleeding, infection, incomplete excision, wound dehiscence, pain, nerve damage, and recurrence. Informed consent was obtained and the patient underwent the procedure as follows:    PROCEDURE: The patient was taken to the operative suite. Time-out was performed.  The treatment area was anesthetized with 1% lidocaine with epinephrine. The area was prepped with Chlorhexidine and rinsed with sterile saline and draped with sterile towels. The lesion was delineated and excised down to subcutaneous fat in a elliptical manner. Hemostasis was obtained by electrocoagulation.     REPAIR: An intermediate layered linear closure was selected as the procedure which would maximally preserve both function and cosmesis.    After the excision of the tumor, the area was carefully undermined. Hemostasis was obtained with bipolar  electrocoagulation.  Closure was oriented so that the wound was in the patient's natural skin tension lines. The deeper layers of subcutaneous and superficial (nonmuscle) fascia (deep layer suturing) were then closed with 3-0 vicryl sutures. The epidermis was then carefully approximated along the length of the wound using (superficial layer suturing) 4-0 monocryl  running subcuticular sutures.     Estimated blood loss was less than 10 ml for all surgical sites. A sterile pressure dressing was applied and wound care instructions, with a written handout, were given. The patient was [facility] alert and ambulatory.    The patient elected for pathology results to automatically release and understands that the clinical staff will contact them as soon as possible to notify them of the results.    Follow-up: PRM    Dr Vazquez performed the excision and repair.    Dr. Yordy Fong was immediately available for the entire surgery and was physicially present for the key portions of the procedure.    Anatomic Pathology Results: pending    Clinical Follow-Up: 2-3 months rosacea follow up    Staff Involved:  Scribe/Staff    Staff Physician Comments:   I saw and evaluated the patient with the Mohs Surgery Fellow (Dr. Meño Vazquez) and I agree with the assessment and plan and the above description of the procedure as documented by the scribe. I was present for the key portions of the procedure and entire exam. I was immediately available in the clinic throughout the procedures.       Yordy Fong DO    Department of Dermatology  [facility]: Phone: [phone], Fax:[phone]  [facility]: Phone: [phone], Fax: [phone]

## 2024-08-15 ENCOUNTER — OFFICE VISIT (OUTPATIENT)
Dept: DERMATOLOGY | Facility: CLINIC | Age: 54
End: 2024-08-15
Payer: COMMERCIAL

## 2024-08-15 DIAGNOSIS — L71.9 ROSACEA: ICD-10-CM

## 2024-08-15 DIAGNOSIS — L02.92 FURUNCLES: Primary | ICD-10-CM

## 2024-08-15 DIAGNOSIS — L72.9 CYST OF SKIN: ICD-10-CM

## 2024-08-15 PROCEDURE — 88305 TISSUE EXAM BY PATHOLOGIST: CPT | Performed by: DERMATOLOGY

## 2024-08-15 PROCEDURE — 87070 CULTURE OTHR SPECIMN AEROBIC: CPT | Performed by: DERMATOLOGY

## 2024-08-15 PROCEDURE — 11423 EXC H-F-NK-SP B9+MARG 2.1-3: CPT | Mod: GC | Performed by: DERMATOLOGY

## 2024-08-15 PROCEDURE — 12042 INTMD RPR N-HF/GENIT2.6-7.5: CPT | Mod: GC | Performed by: DERMATOLOGY

## 2024-08-15 RX ORDER — MUPIROCIN 20 MG/G
OINTMENT TOPICAL
Qty: 22 G | Refills: 0 | Status: SHIPPED | OUTPATIENT
Start: 2024-08-15

## 2024-08-15 RX ORDER — METRONIDAZOLE 7.5 MG/G
GEL TOPICAL
Qty: 45 G | Refills: 0 | Status: SHIPPED | OUTPATIENT
Start: 2024-08-15

## 2024-08-15 RX ORDER — TADALAFIL 5 MG/1
5 TABLET ORAL EVERY 24 HOURS
COMMUNITY

## 2024-08-15 ASSESSMENT — PAIN SCALES - GENERAL: PAINLEVEL: NO PAIN (0)

## 2024-08-15 NOTE — NURSING NOTE
Ryan Briseno's chief complaint for this visit includes:  Chief Complaint   Patient presents with    Excision     EIC left posterior neck    Derm Problem     Discuss frequent lesions on chest, neck, and postauricular.      PCP: Mary Cline    Referring Provider:  Referred Self, MD  No address on file    There were no vitals taken for this visit.  No Pain (0)        Allergies   Allergen Reactions    Penicillins Hives         Do you need any medication refills at today's visit? No    Ruba Lisa CMA

## 2024-08-15 NOTE — LETTER
8/15/2024      Ryan Briseno  [address]  [facility] [address]      Dear Colleague,    Thank you for referring your patient, Ryan Briseno, to the [facility]. Please see a copy of my visit note below.    DERMATOLOGY EXCISION PROCEDURE NOTE    Dermatology Problem List:  1. BCC, R upper lip. Schedule Mohs (patient has deferred in spite of written communication risk letter).       BCC, R medial lower eyelid, s/p Mohs and rhombic flap 1/31/22    2. Acrochordon left upper eyelid              LN2 today  3. Rosacea, papular pustular type.  4. Inflamed EIC, L neck, s/p excision 8/15/24  - I&D and ILK 20 mg/ml preformed 2/5/24  - Doxycycline 100 mg twice daily x 14 days  5. Recurrent Colon Adenocarcinoma   ____________________________________________       NAME OF PROCEDURE: Excision intermediate layered linear closure  Staff surgeon: Yordy Fong DO  Fellow: Meño Vazquez MD  Scrub Nurse: RICK Yeh    PRE-OPERATIVE DIAGNOSIS:  cyst  POST-OPERATIVE DIAGNOSIS: Same   LOCATION: left posterior neck  FINAL EXCISION SIZE(DEFECT SIZE): 2.7x1.2 cm  MARGIN: 0.1 cm  FINAL REPAIR LENGTH: 4.9 cm   ANESTHESIA: 12mL 1% lidocaine with 1:100,000 epinephrine    INDICATIONS: This patient presented with a 2.5x1.0cm cyst. Excision was indicated. We discussed the principles of treatment and most likely complications including scarring, bleeding, infection, incomplete excision, wound dehiscence, pain, nerve damage, and recurrence. Informed consent was obtained and the patient underwent the procedure as follows:    PROCEDURE: The patient was taken to the operative suite. Time-out was performed.  The treatment area was anesthetized with 1% lidocaine with epinephrine. The area was prepped with Chlorhexidine and rinsed with sterile saline and draped with sterile towels. The lesion was delineated and excised down to subcutaneous fat in a elliptical manner. Hemostasis was obtained by electrocoagulation.     REPAIR:  An intermediate layered linear closure was selected as the procedure which would maximally preserve both function and cosmesis.    After the excision of the tumor, the area was carefully undermined. Hemostasis was obtained with bipolar electrocoagulation.  Closure was oriented so that the wound was in the patient's natural skin tension lines. The deeper layers of subcutaneous and superficial (nonmuscle) fascia (deep layer suturing) were then closed with 3-0 vicryl sutures. The epidermis was then carefully approximated along the length of the wound using (superficial layer suturing) 4-0 monocryl  running subcuticular sutures.     Estimated blood loss was less than 10 ml for all surgical sites. A sterile pressure dressing was applied and wound care instructions, with a written handout, were given. The patient was discharged from the [facility] alert and ambulatory.    The patient elected for pathology results to automatically release and understands that the clinical staff will contact them as soon as possible to notify them of the results.    Follow-up: PRM    Dr Vazquez performed the excision and repair.    Dr. Yordy Fong was immediately available for the entire surgery and was physicially present for the key portions of the procedure.    Anatomic Pathology Results: pending    Clinical Follow-Up: 2-3 months rosacea follow up    Staff Involved:  Scribe/Staff    Staff Physician Comments:   I saw and evaluated the patient with the Mohs Surgery Fellow (Dr. Meño Vazquez) and I agree with the assessment and plan and the above description of the procedure as documented by the scribe. I was present for the key portions of the procedure and entire exam. I was immediately available in the clinic throughout the procedures.       Yordy Fong DO    Department of Dermatology  [facility]: Phone: [phone],  Fax:[phone]  [facility]: Phone: [phone], Fax: [phone]      Again, thank you for allowing me to participate in the care of your patient.        Sincerely,        Yordy Fong MD

## 2024-08-15 NOTE — PATIENT INSTRUCTIONS
Caring for your skin after surgery    After your surgery, a pressure bandage will be placed over the area. This will prevent bleeding. Please follow these instructions over the next 1 to 2 weeks. Following this regimen will help to prevent complications as your wound heals.     For the first 48 hours after your surgery:    Leave the pressure dressing on and keep it dry. If it should come loose, you may re-tape it, but do not take it off.  Relax and take it easy. Do not do any vigorous exercise, heavy lifting or bending forward. This could cause the wound to bleed.  Post-operative pain is usually mild. You may alternate between 1000 mg of Tylenol (acetaminophen) and 400 mg of Ibuprofen every 4 hours.  Do not take more than 4,000 mg of acetaminophen in a 24-hour period or 3200 mg of Ibuprofen in a 24-hr period.  Avoid alcohol and vitamin E as these may increase your tendency to bleed.  You may put an ice pack around the bandaged area for 20 minutes at a time as needed. This may help reduce swelling, bruising, and pain. Make sure the ice pack is waterproof so that the pressure bandage doesn't get wet.  You may see a small amount of drainage or blood on your pressure bandage. This is normal. However:  If drainage or bleeding continues or saturates the bandage, you will need to apply firm pressure over the bandage with a clean washcloth for 15 minutes.  If bleeding continues after applying pressure for 15 minutes, apply an ice pack with gentle pressure to the bandaged area for another 15 minutes.  If bleeding still continues, call our office or go to the nearest emergency room.    48 Hours After Surgery:    Remove outer white bandage down to clear plastic film (Tegaderm).  You may notice dark brown, dried blood under the Tegaderm.  This is normal.    Leave the clear plastic film (Tegaderm) on for up to 2 weeks, as long as it is intact.  If it falls off prior to 2 weeks follow daily wound care below.  If it stays intact  for the full 2 weeks, then remove and treat as normal, healthy skin.    Daily Wound Care (if Tegaderm and Steri-Strips fall off prior to 2 weeks):    Wash wound with a mild soap and water.  Use caution when washing the wound, be gentle and do not let the forceful shower stream hit the wound directly. DO NOT WASH WITH HYDROGEN PEROXIDE AS THIS MIGHT CAUSE THE STITCHES TO DISSOLVE FASTER THAN WHAT WE WANT.    Pat dry.    Apply Vaseline (from a new container or tube) over the suture line with a Q-tip until it is completely healed. It is very important to keep the wound continuously moist, as wounds heal best in a moist environment.    Keep the site covered until it's healed.  You can cover it with a Telfa (non-stick) dressing and tape or a band-aid until healed with normal, healthy skin.      Call Us If:    You have bleeding that will not stop after applying pressure and ice.  You have pain that is not controlled with Tylenol and Ibuprofen.  You have signs or symptoms of an infection such as fever over 100 degrees Fahrenheit, redness, warmth or foul-smelling drainage from the wound    [facility]: [phone]   [facility]: [phone]  For urgent needs outside of business hours call the [facility] at [phone] and ask to speak with the dermatology resident on call

## 2024-08-15 NOTE — NURSING NOTE
The following medication was given:     MEDICATION:  Lidocaine with epinephrine 1% [id number]  ROUTE: SQ  SITE: see procedure note  DOSE:9 mL  LOT #: [id number]  : [employer]  EXPIRATION DATE: 01/31/2026  NDC#: 17748-882-17  Was there drug waste? no  Multi-dose vial: Yes    Mastisol, Steri-Strips, Tegaderm and pressure dressing applied to excision site on left posterior neck.  Wound care instructions reviewed with patient and AVS provided.  Patient verbalized understanding.  Patient will follow up for suture removal: N/A.  No further questions or concerns at this time.      Ruba Lisa CMA  August 15, 2024

## 2024-08-17 LAB — BACTERIA SPEC CULT: NORMAL

## 2024-08-19 LAB
PATH REPORT.COMMENTS IMP SPEC: NORMAL
PATH REPORT.COMMENTS IMP SPEC: NORMAL
PATH REPORT.FINAL DX SPEC: NORMAL
PATH REPORT.GROSS SPEC: NORMAL
PATH REPORT.MICROSCOPIC SPEC OTHER STN: NORMAL
PATH REPORT.RELEVANT HX SPEC: NORMAL

## 2024-08-20 ENCOUNTER — TELEPHONE (OUTPATIENT)
Dept: DERMATOLOGY | Facility: CLINIC | Age: 54
End: 2024-08-20
Payer: COMMERCIAL

## 2024-08-20 NOTE — TELEPHONE ENCOUNTER
3 Result Notes       1 Patient Communication  Culture 4+ Normal Letty           Resulting Agency: [facility]           Specimen Collected: 08/15/24  4:34 PM Last Resulted: 08/17/24 12:10 PM       Order Details        View Encounter        Lab and Collection Details        Routing        Result History     View All Conversations on this Encounter           Result Care Coordination      Result Notes     Yoana Floyd LPN  8/20/2024  9:20 AM CDT Back to Top      Pt was happy to hear the results. Did have questions about the bandaging and those were answered. He is adding some tape to the bandage everyday just to keep it down everyday but he said it is staying in place for the most part.     Yoana Floyd LPN on 8/20/2024 at 9:20 AM    Yordy Fong MD  8/19/2024  5:18 PM CDT       Please call the patient with pathology results.     The pathologist saw a ruptured hair follicle, but no cyst capsule. Ideally removing the problem follicles will solve the inflammation symptoms. As long as the wound is healing well, no additional surgery is necessary.  Thank you.    Yordy Fong MD  8/19/2024  8:32 AM CDT       Please call the patient with culture results.     The culture only grew normal bacteria of the nostrils. No changes from the plan discussed in the clinic.     Thank you.

## 2024-10-01 ENCOUNTER — MYC MEDICAL ADVICE (OUTPATIENT)
Dept: ONCOLOGY | Facility: CLINIC | Age: 54
End: 2024-10-01
Payer: COMMERCIAL

## 2024-10-01 DIAGNOSIS — C18.2 MALIGNANT NEOPLASM OF ASCENDING COLON (H): Primary | ICD-10-CM

## 2024-10-01 DIAGNOSIS — C18.9 MALIGNANT NEOPLASM OF COLON, UNSPECIFIED PART OF COLON (H): ICD-10-CM

## 2024-10-04 ENCOUNTER — TELEPHONE (OUTPATIENT)
Dept: GASTROENTEROLOGY | Facility: CLINIC | Age: 54
End: 2024-10-04
Payer: COMMERCIAL

## 2024-10-04 NOTE — TELEPHONE ENCOUNTER
"Endoscopy Scheduling Screen    Have you had any respiratory illness or flu-like symptoms in the last 10 days?  No    What is your communication preference for Instructions and/or Bowel Prep?   MyChart    What insurance is in the chart?  Other:  [employer]    Ordering/Referring Provider: LUZ MARINA PADILLA    (If ordering provider performs procedure, schedule with ordering provider unless otherwise instructed. )    BMI: Estimated body mass index is 32.58 kg/m  as calculated from the following:    Height as of 4/5/24: 1.803 m (5' 11\").    Weight as of 4/5/24: 106 kg (233 lb 9.6 oz).     Sedation Ordered  MAC/deep sedation.   BMI<= 45 45 < BMI <= 48 48 < BMI < = 50  BMI > 50   No Restrictions No [facility]  No [facility]  [facility] with exceptions Hospital Only OR Only       Do you have a history of malignant hyperthermia?  No    (Females) Are you currently pregnant?   No     Have you been diagnosed or told you have pulmonary hypertension?   No    Do you have an LVAD?  No    Have you been told you have moderate to severe sleep apnea?  No.    Have you been told you have COPD, asthma, or any other lung disease?  No    Do you have any heart conditions?  No     Have you ever had or are you waiting for an organ transplant?  No. Continue scheduling, no site restrictions.    Have you had a stroke or transient ischemic attack (TIA aka \"mini stroke\" in the last 6 months?   No    Have you been diagnosed with or been told you have cirrhosis of the liver?   No.    Are you currently on dialysis?   No    Do you need assistance transferring?   No    BMI: Estimated body mass index is 32.58 kg/m  as calculated from the following:    Height as of 4/5/24: 1.803 m (5' 11\").    Weight as of 4/5/24: 106 kg (233 lb 9.6 oz).     Is patients BMI > 40 and scheduling location [facility]?  No    Do you take an injectable or oral medication for weight loss or diabetes (excluding insulin)?  No    Do you take the medication Naltrexone?  No    Do you take blood " thinners?  No       Prep   Are you currently on dialysis or do you have chronic kidney disease?  No    Do you have a diagnosis of diabetes?  No    Do you have a diagnosis of cystic fibrosis (CF)?  No    On a regular basis do you go 3 -5 days between bowel movements?  No    BMI > 40?  No    Preferred Pharmacy:    [facility]/pharmacy #[id number] - [facility] [address] [place] AT [facility]  [address] [facility] [address]  Phone: [phone] Fax: [phone]      Final Scheduling Details     Procedure scheduled  Colonoscopy    Surgeon:  COMFORT     Date of procedure:  11/20/24     Pre-OP / PAC:   No - Not required for this site.    Location  [facility] - Patient preference.    Sedation   MAC/Deep Sedation - Per order.      Patient Reminders:   You will receive a call from a Nurse to review instructions and health history.  This assessment must be completed prior to your procedure.  Failure to complete the Nurse assessment may result in the procedure being cancelled.      On the day of your procedure, please designate an adult(s) who can drive you home stay with you for the next 24 hours. The medicines used in the exam will make you sleepy. You will not be able to drive.      You cannot take public transportation, ride share services, or non-medical taxi service without a responsible caregiver.  Medical transport services are allowed with the requirement that a responsible caregiver will receive you at your destination.  We require that drivers and caregivers are confirmed prior to your procedure.

## 2024-10-08 DIAGNOSIS — C18.2 MALIGNANT NEOPLASM OF ASCENDING COLON (H): Primary | ICD-10-CM

## 2024-10-08 NOTE — TELEPHONE ENCOUNTER
CT CAP ordered. Please ensure visit with Dr. Leslie is scheduled afterwards to review results of imaging and colonoscopy.

## 2024-10-17 ENCOUNTER — PATIENT OUTREACH (OUTPATIENT)
Dept: GASTROENTEROLOGY | Facility: CLINIC | Age: 54
End: 2024-10-17
Payer: COMMERCIAL

## 2024-10-17 DIAGNOSIS — Z12.11 SPECIAL SCREENING FOR MALIGNANT NEOPLASMS, COLON: Primary | ICD-10-CM

## 2024-10-17 NOTE — PROGRESS NOTES
"CRC Screening Colonoscopy Referral Review    Patient meets the inclusion criteria for screening colonoscopy standing order.    Ordering/Referring Provider:  Mary Cline      BMI: Estimated body mass index is 32.58 kg/m  as calculated from the following:    Height as of 4/5/24: 1.803 m (5' 11\").    Weight as of 4/5/24: 106 kg (233 lb 9.6 oz).     Sedation:  Does patient have any of the following conditions affecting sedation?  No medical conditions affecting sedation.    Previous Scopes:  Any previous recommendations or follow up needs based on previous scope?  Prep recommendations: GL  Sedation recommendations: MAC    Medical Concerns to Postpone Order:  Does patient have any of the following medical concerns that should postpone/delay colonoscopy referral?  No medical conditions affecting colonoscopy referral.    Final Referral Details:  Based on patient's medical history patient is appropriate for referral order with MAC/deep sedation.   BMI<= 45 45 < BMI <= 48 48 < BMI < = 50  BMI > 50   No Restrictions No [facility]  No [facility]  [facility] with exceptions Hospital Only OR Only     "

## 2024-10-31 ENCOUNTER — TELEPHONE (OUTPATIENT)
Dept: GASTROENTEROLOGY | Facility: CLINIC | Age: 54
End: 2024-10-31
Payer: COMMERCIAL

## 2024-10-31 DIAGNOSIS — Z86.0100 HISTORY OF COLONIC POLYPS: Primary | ICD-10-CM

## 2024-10-31 RX ORDER — BISACODYL 5 MG/1
TABLET, DELAYED RELEASE ORAL
Qty: 4 TABLET | Refills: 0 | Status: SHIPPED | OUTPATIENT
Start: 2024-10-31

## 2024-10-31 NOTE — TELEPHONE ENCOUNTER
Pre visit planning completed.      Procedure details:    Patient scheduled for Colonoscopy on 11.20.2024.     Arrival time: 1330. Procedure time 1430    Facility location: [facility]; [address], [place] [address]. Check in location: 2nd Floor.    Sedation type: MAC    Pre op exam needed? No.    Indication for procedure:     History of Polyps         Chart review:     Electronic implanted devices? No    Recent diagnosis of diverticulitis within the last 6 weeks? No      Medication review:    Diabetic? No    Anticoagulants? No    Weight loss medication/injectable? No.    Other medication HOLDING recommendations:  N/A      Prep for procedure:     Bowel prep recommendation: Standard Golytely. Bowel prep prescription sent to [facility] #[id number] - [place], [place] - [address], [place] AT [facility]  Due to: patient request/preference.     Prep instructions sent via tripp Tolbert RN  Endoscopy Procedure Pre Assessment RN  [phone] option 2

## 2024-11-06 DIAGNOSIS — K62.5 RECTAL BLEEDING: Primary | ICD-10-CM

## 2024-11-06 DIAGNOSIS — C18.2 MALIGNANT NEOPLASM OF ASCENDING COLON (H): ICD-10-CM

## 2024-11-07 NOTE — TELEPHONE ENCOUNTER
Pre assessment completed for upcoming procedure.   (Please see previous telephone encounter notes for complete details)      Procedure details:    Arrival time and facility location reviewed.    Pre op exam needed? No.    Designated  policy reviewed. Instructed to have someone stay 24  hours post procedure.       Medication review:    Medications reviewed. Please see supporting documentation below. Holding recommendations discussed (if applicable).       Prep for procedure:     Patient stated they have already reviewed the bowel prep instructions and writer answered all patient questions (if applicable). NPO instructions reviewed.    Patient was instructed to call if any questions or concerns arise.       Any additional information needed:  N/A      Patient verbalized understanding and had no questions or concerns at this time.      Treva Riggins RN  Endoscopy Procedure Pre Assessment   [phone] option 2

## 2024-11-11 ENCOUNTER — TELEPHONE (OUTPATIENT)
Dept: DERMATOLOGY | Facility: CLINIC | Age: 54
End: 2024-11-11
Payer: COMMERCIAL

## 2024-11-11 NOTE — TELEPHONE ENCOUNTER
[facility] Call Center    Phone Message    May a detailed message be left on voicemail: yes     Reason for Call: Other: Please call pt to reschedule his 3 month follow up with Dr. Fong. Thank you     Action Taken: TE SENT    Travel Screening: Not Applicable     Date of Service:                   Thank you!  [facility]

## 2024-11-15 ENCOUNTER — ANCILLARY PROCEDURE (OUTPATIENT)
Dept: CT IMAGING | Facility: CLINIC | Age: 54
End: 2024-11-15
Payer: COMMERCIAL

## 2024-11-15 DIAGNOSIS — C18.2 MALIGNANT NEOPLASM OF ASCENDING COLON (H): ICD-10-CM

## 2024-11-15 PROCEDURE — 71260 CT THORAX DX C+: CPT | Performed by: STUDENT IN AN ORGANIZED HEALTH CARE EDUCATION/TRAINING PROGRAM

## 2024-11-15 PROCEDURE — 74177 CT ABD & PELVIS W/CONTRAST: CPT | Performed by: STUDENT IN AN ORGANIZED HEALTH CARE EDUCATION/TRAINING PROGRAM

## 2024-11-15 RX ORDER — IOPAMIDOL 755 MG/ML
129 INJECTION, SOLUTION INTRAVASCULAR ONCE
Status: COMPLETED | OUTPATIENT
Start: 2024-11-15 | End: 2024-11-15

## 2024-11-15 RX ADMIN — IOPAMIDOL 129 ML: 755 INJECTION, SOLUTION INTRAVASCULAR at 14:50

## 2024-11-20 ENCOUNTER — TRANSFERRED RECORDS (OUTPATIENT)
Dept: HEALTH INFORMATION MANAGEMENT | Facility: CLINIC | Age: 54
End: 2024-11-20
Payer: COMMERCIAL

## 2024-11-20 PROCEDURE — 88305 TISSUE EXAM BY PATHOLOGIST: CPT | Mod: 26 | Performed by: PATHOLOGY

## 2024-11-20 PROCEDURE — 88305 TISSUE EXAM BY PATHOLOGIST: CPT | Mod: TC,ORL | Performed by: COLON & RECTAL SURGERY

## 2024-11-21 ENCOUNTER — LAB REQUISITION (OUTPATIENT)
Dept: LAB | Facility: CLINIC | Age: 54
End: 2024-11-21
Payer: COMMERCIAL

## 2024-12-18 NOTE — PROGRESS NOTES
Dermatologic Surgery Post-Op Scar Check     CC: Derm Problem (3 month follow up - metro gel and mupurocin ointment)      Dermatology Problem List:    1. BCC, R upper lip. Schedule Mohs (patient has deferred in spite of written communication risk letter).       BCC, R medial lower eyelid, s/p Mohs and rhombic flap 1/31/22    2. Acrochordon left upper eyelid              LN2 today  3. Rosacea, papular pustular type.  4. Inflamed EIC, L neck, s/p excision 8/15/24  - I&D and ILK 20 mg/ml preformed 2/5/24  - Doxycycline 100 mg twice daily x 14 days  5. Recurrent Colon Adenocarcinoma   ____________________________________________    Subjective: Ryan Briseno is a 54 year old male who presents today after excision on 8/15/24.  - He reports the excision site is healing well, there have been no complications.   - Patient reports that he found a papule on his neck recently. His wife suggested that he put a Band-Aid over it. The patient notes that after applying the bandage it seems to dry up and fall off. When he has papules reappear they don't come back in the same exact spot as before. There's nothing specific he has found that he is doing or eating that causes them to appear. He's recently eaten more sweets due to the holidays, but he doesn't associate this with the spots appearing because he has them appear regardless. His wife has told him she feels his face is more oily than the rest of his body. He wonders if this contributes to the reoccurring papules.   - He notes he just went through colon cancer treatment and surgeries. Recent pathology showed he is cancer free.   - no other concerns today    Objective: An exam of the left posterior neck was performed today   - Left posterior neck, surgical sit is well healed.  - There are pink inflammatory papules and pustules scattered on the neck, left ear lobe, left cheek and temple.     Assessment and Plan:     1. Post-surgical scar, left posterior neck, s/p excision   8/15/24  - Surgical site healed well.  - The patient should follow up with dermatologic surgery PRN, as well as continue with regular skin exams in general dermatology clinic.    2. Inflammatory Acne vs papule/pustule Rosacea  - Previously prescribed Viperazone for re-colonization, however, culture did not confirm bacterial component. He has been inconsistent with Metrogel but did not perceive a significant improvement with that. We discussed alternative acne treatment including oral antibiotics and Accutane. He is hesitant to start these medicines as he recently has colon cancer in remission. Additionally we discussed starting OTC Adapalene gel twice weekly and working up to daily. Also, the inflammatory papules seem to be localized to his neck in the collar area. If there's a way to minimize friction and irritants it may be helpful.     Patient was discussed with and evaluated by attending physician Dr. Yordy Fong.    Scribe Disclosure:   I, Cheri Hawkins, am serving as a scribe; to document services personally performed by Dr. Yordy Fong - -based on data collection and the provider's statements to me.     Provider Disclosure:   The documentation recorded by the scribe accurately reflects the services I personally performed and the decisions made by me.    Yordy Fong DO    Department of Dermatology  [facility]: Phone: [phone], Fax:[phone]  [facility]: Phone: [phone], Fax: [phone]

## 2024-12-19 ENCOUNTER — OFFICE VISIT (OUTPATIENT)
Dept: DERMATOLOGY | Facility: CLINIC | Age: 54
End: 2024-12-19
Payer: COMMERCIAL

## 2024-12-19 DIAGNOSIS — L71.9 ROSACEA: Primary | ICD-10-CM

## 2024-12-19 NOTE — NURSING NOTE
Ryan Briseno's goals for this visit include:   Chief Complaint   Patient presents with    Derm Problem     3 month follow up - metro gel and mupurocin ointment       He requests these members of his care team be copied on today's visit information:     PCP: Mary Cline    Referring Provider:  Yordy Fong MD  [address] [place] [address]    There were no vitals taken for this visit.    Do you need any medication refills at today's visit?       Penny Frances EMT

## 2024-12-19 NOTE — PATIENT INSTRUCTIONS
Topical Retinoids    What are topical retinoids?  These are medicines that are related to Vitamin A. They are used on the skin.  Retin-A , Renova , Differin , and Tazorac  are brand names.  Come in creams and clear gels  Used to treat skin conditions like pimples (acne), face wrinkling, or dark-colored sunspots    How do I use these medicines?  Wash face and let dry for 15 to 30 minutes.  Use a large pea-size amount of medicine to cover the whole face. Do not put on close to the eyes and lips. Rub in gently.   Start by using every other day. If you have no irritation after a few days, start to use it daily.   You might have too much irritation with daily use. Use it less often until the irritation goes away. Then try to increase slowly to daily use.   Irritation improves over time.  You may use moisturizer if your skin becomes dry. Look for  non-comedogenic  (non-pore plugging) and oil free products.     What are the side effects?  Dryness   Peeling and flaking   Irritation of the skin   Possible increased chance of sunburns. Protect your skin from sunlight. Wear a hat and use a sunscreen with SPF 30 or higher. Your sunscreen should have both UVA and UVB (broad-spectrum) protection.    Who should I call with questions?  [facility]: [phone]   [facility]: [phone]  For urgent needs outside of business hours call the [facility] at [phone] and ask for the dermatology resident on call

## 2024-12-19 NOTE — LETTER
12/19/2024      Ryan Briseno  [address] [place] [address]      Dear Colleague,    Thank you for referring your patient, Ryan Briseno, to the [facility]. Please see a copy of my visit note below.    Dermatologic Surgery Post-Op Scar Check     CC: Derm Problem (3 month follow up - metro gel and mupurocin ointment)      Dermatology Problem List:    1. BCC, R upper lip. Schedule Mohs (patient has deferred in spite of written communication risk letter).       BCC, R medial lower eyelid, s/p Mohs and rhombic flap 1/31/22    2. Acrochordon left upper eyelid              LN2 today  3. Rosacea, papular pustular type.  4. Inflamed EIC, L neck, s/p excision 8/15/24  - I&D and ILK 20 mg/ml preformed 2/5/24  - Doxycycline 100 mg twice daily x 14 days  5. Recurrent Colon Adenocarcinoma   ____________________________________________    Subjective: Ryan Briseno is a 54 year old male who presents today after excision on 8/15/24.  - He reports the excision site is healing well, there have been no complications.   - Patient reports that he found a papule on his neck recently. His wife suggested that he put a Band-Aid over it. The patient notes that after applying the bandage it seems to dry up and fall off. When he has papules reappear they don't come back in the same exact spot as before. There's nothing specific he has found that he is doing or eating that causes them to appear. He's recently eaten more sweets due to the holidays, but he doesn't associate this with the spots appearing because he has them appear regardless. His wife has told him she feels his face is more oily than the rest of his body. He wonders if this contributes to the reoccurring papules.   - He notes he just went through colon cancer treatment and surgeries. Recent pathology showed he is cancer free.   - no other concerns today    Objective: An exam of the left posterior neck was performed today   - Left posterior  neck, surgical sit is well healed.  - There are pink inflammatory papules and pustules scattered on the neck, left ear lobe, left cheek and temple.     Assessment and Plan:     1. Post-surgical scar, left posterior neck, s/p excision  8/15/24  - Surgical site healed well.  - The patient should follow up with dermatologic surgery PRN, as well as continue with regular skin exams in general dermatology clinic.    2. Inflammatory Acne vs papule/pustule Rosacea  - Previously prescribed Viperazone for re-colonization, however, culture did not confirm bacterial component. He has been inconsistent with Metrogel but did not perceive a significant improvement with that. We discussed alternative acne treatment including oral antibiotics and Accutane. He is hesitant to start these medicines as he recently has colon cancer in remission. Additionally we discussed starting OTC Adapalene gel twice weekly and working up to daily. Also, the inflammatory papules seem to be localized to his neck in the collar area. If there's a way to minimize friction and irritants it may be helpful.     Patient was discussed with and evaluated by attending physician Dr. Yordy Fong.    Scribe Disclosure:   I, Cheri Hawkins, am serving as a scribe; to document services personally performed by Dr. Yordy Fong - -based on data collection and the provider's statements to me.     Provider Disclosure:   The documentation recorded by the scribe accurately reflects the services I personally performed and the decisions made by me.    Yordy Fong DO    Department of Dermatology  [facility][note 3]: Phone: [phone], Fax:[phone]  [facility]: Phone: [phone], Fax: [phone]      Again, thank you for allowing me to participate in the care of your patient.        Sincerely,        Yordy Fong MD

## 2025-04-02 SDOH — HEALTH STABILITY: PHYSICAL HEALTH: ON AVERAGE, HOW MANY DAYS PER WEEK DO YOU ENGAGE IN MODERATE TO STRENUOUS EXERCISE (LIKE A BRISK WALK)?: 5 DAYS

## 2025-04-02 SDOH — HEALTH STABILITY: PHYSICAL HEALTH: ON AVERAGE, HOW MANY MINUTES DO YOU ENGAGE IN EXERCISE AT THIS LEVEL?: 30 MIN

## 2025-04-02 ASSESSMENT — SOCIAL DETERMINANTS OF HEALTH (SDOH): HOW OFTEN DO YOU GET TOGETHER WITH FRIENDS OR RELATIVES?: ONCE A WEEK

## 2025-04-07 ENCOUNTER — MYC MEDICAL ADVICE (OUTPATIENT)
Dept: FAMILY MEDICINE | Facility: CLINIC | Age: 55
End: 2025-04-07

## 2025-04-07 ENCOUNTER — OFFICE VISIT (OUTPATIENT)
Dept: FAMILY MEDICINE | Facility: CLINIC | Age: 55
End: 2025-04-07
Attending: FAMILY MEDICINE
Payer: COMMERCIAL

## 2025-04-07 VITALS
SYSTOLIC BLOOD PRESSURE: 134 MMHG | BODY MASS INDEX: 34.05 KG/M2 | DIASTOLIC BLOOD PRESSURE: 82 MMHG | WEIGHT: 243.2 LBS | HEART RATE: 79 BPM | OXYGEN SATURATION: 98 % | HEIGHT: 71 IN | TEMPERATURE: 98.4 F | RESPIRATION RATE: 9 BRPM

## 2025-04-07 DIAGNOSIS — Z13.1 DIABETES MELLITUS SCREENING: ICD-10-CM

## 2025-04-07 DIAGNOSIS — C18.9 MALIGNANT NEOPLASM OF COLON, UNSPECIFIED PART OF COLON (H): ICD-10-CM

## 2025-04-07 DIAGNOSIS — R03.0 ELEVATED BP WITHOUT DIAGNOSIS OF HYPERTENSION: ICD-10-CM

## 2025-04-07 DIAGNOSIS — Z13.220 SCREENING CHOLESTEROL LEVEL: ICD-10-CM

## 2025-04-07 DIAGNOSIS — Z12.5 PROSTATE CANCER SCREENING: ICD-10-CM

## 2025-04-07 DIAGNOSIS — Z00.00 ROUTINE GENERAL MEDICAL EXAMINATION AT A HEALTH CARE FACILITY: Primary | ICD-10-CM

## 2025-04-07 PROCEDURE — 3079F DIAST BP 80-89 MM HG: CPT | Performed by: FAMILY MEDICINE

## 2025-04-07 PROCEDURE — 3075F SYST BP GE 130 - 139MM HG: CPT | Performed by: FAMILY MEDICINE

## 2025-04-07 PROCEDURE — 1126F AMNT PAIN NOTED NONE PRSNT: CPT | Performed by: FAMILY MEDICINE

## 2025-04-07 PROCEDURE — 99396 PREV VISIT EST AGE 40-64: CPT | Performed by: FAMILY MEDICINE

## 2025-04-07 ASSESSMENT — PAIN SCALES - GENERAL
PAINLEVEL_OUTOF10: NO PAIN (0)
PAINLEVEL_OUTOF10: NO PAIN (0)

## 2025-04-07 NOTE — PROGRESS NOTES
"Preventive Care Visit  [facility] KRISHNA Hernandez Bernardino Cline MD, Family Medicine  Apr 7, 2025      Assessment & Plan     Routine general medical examination at a health care facility  Routine health maintenance otherwise up-to-date.  Discussed recommended vaccination schedule    Elevated BP without diagnosis of hypertension  Home blood pressure readings are in the 130s but never really into the 140s.  And his exercise has fallen off.  When he was more diligent he was 10+ pounds lighter and I am sure numbers will be better.    Malignant neoplasm of colon, unspecified part of colon (H)  Continues to follow with oncology.  Colonoscopy 6 months ago was normal    Screening cholesterol level    - Lipid panel reflex to direct LDL Fasting; Future    Diabetes mellitus screening    - Glucose; Future    Prostate cancer screening    - Prostate Specific Antigen Screen; Future    Patient has been advised of split billing requirements and indicates understanding: Yes        BMI  Estimated body mass index is 33.92 kg/m  as calculated from the following:    Height as of this encounter: 1.803 m (5' 11\").    Weight as of this encounter: 110.3 kg (243 lb 3.2 oz).   Weight management plan: Discussed healthy diet and exercise guidelines    Counseling  Appropriate preventive services were addressed with this patient via screening, questionnaire, or discussion as appropriate for fall prevention, nutrition, physical activity, Tobacco-use cessation, social engagement, weight loss and cognition.  Checklist reviewing preventive services available has been given to the patient.  Reviewed patient's diet, addressing concerns and/or questions.   The patient was instructed to see the dentist every 6 months.   He is at risk for psychosocial distress and has been provided with information to reduce risk.       Work on weight loss  Regular exercise  See Patient Instructions    Dimitri Davis is a 54 year old, presenting for the " following:  Physical        4/7/2025     3:57 PM   Additional Questions   Roomed by Jose Carlos   Accompanied by self          HPI  Here today for routine checkup         Advance Care Planning  Patient does not have a Health Care Directive:       4/2/2025   General Health   How would you rate your overall physical health? Good   Feel stress (tense, anxious, or unable to sleep) To some extent   (!) STRESS CONCERN      4/2/2025   Nutrition   Three or more servings of calcium each day? Yes   Diet: Regular (no restrictions)   How many servings of fruit and vegetables per day? (!) 0-1   How many sweetened beverages each day? 0-1         4/2/2025   Exercise   Days per week of moderate/strenous exercise 5 days   Average minutes spent exercising at this level 30 min         4/2/2025   Social Factors   Frequency of gathering with friends or relatives Once a week   Worry food won't last until get money to buy more No   Food not last or not have enough money for food? No   Do you have housing? (Housing is defined as stable permanent housing and does not include staying ouside in a car, in a tent, in an abandoned building, in an overnight shelter, or couch-surfing.) Yes   Are you worried about losing your housing? No   Lack of transportation? No   Unable to get utilities (heat,electricity)? No         4/2/2025   Fall Risk   Fallen 2 or more times in the past year? No   Trouble with walking or balance? No          4/2/2025   Dental   Dentist two times every year? (!) NO           3/29/2024   TB Screening   Were you born outside of the [place]? No           Today's PHQ-2 Score:       4/7/2025     7:34 AM   PHQ-2 ( 1999 [employer])   Q1: Little interest or pleasure in doing things 0   Q2: Feeling down, depressed or hopeless 0   PHQ-2 Score 0    Q1: Little interest or pleasure in doing things Not at all   Q2: Feeling down, depressed or hopeless Not at all   PHQ-2 Score 0       Patient-reported           4/2/2025   Substance Use   Alcohol more  than 3/day or more than 7/wk Not Applicable   Do you use any other substances recreationally? No     Social History     Tobacco Use     Smoking status: Former     Current packs/day: 0.00     Average packs/day: 0.8 packs/day for 67.1 years (52.1 ttl pk-yrs)     Types: Cigarettes     Start date: 1987     Quit date: 2024     Years since quittin.1     Smokeless tobacco: Never     Tobacco comments:     Smoked for 38yrs and quit 2024   Vaping Use     Vaping status: Some Days     Substances: Nicotine   Substance Use Topics     Alcohol use: Not Currently     Comment: not for 5 years      Drug use: Not Currently     Types: Cocaine, Marijuana     Comment: usage was back in my 20's and 30's.           2025   STI Screening   New sexual partner(s) since last STI/HIV test? No   ASCVD Risk   The 10-year ASCVD risk score (Joana GARNER, et al., 2019) is: 5.5%    Values used to calculate the score:      Age: 54 years      Sex: Male      Is Non- : No      Diabetic: No      Tobacco smoker: No      Systolic Blood Pressure: 134 mmHg      Is BP treated: No      HDL Cholesterol: 32 mg/dL      Total Cholesterol: 154 mg/dL           Reviewed and updated as needed this visit by Provider   Tobacco  Allergies  Meds  Problems  Med Hx  Surg Hx  Fam Hx            Lab work is in process  Labs reviewed in EPIC  BP Readings from Last 3 Encounters:   25 134/82   24 (!) 159/92   24 130/72    Wt Readings from Last 3 Encounters:   25 110.3 kg (243 lb 3.2 oz)   24 106 kg (233 lb 9.6 oz)   24 106 kg (233 lb 9.6 oz)                      Review of Systems  CONSTITUTIONAL: NEGATIVE for fever, chills, change in weight  INTEGUMENTARY/SKIN: NEGATIVE for worrisome rashes, moles or lesions  EYES: NEGATIVE for vision changes or irritation  ENT/MOUTH: NEGATIVE for ear, mouth and throat problems  RESP: NEGATIVE for significant cough or SOB  BREAST: NEGATIVE for masses,  "tenderness or discharge  CV: NEGATIVE for chest pain, palpitations or peripheral edema  GI: NEGATIVE for nausea, abdominal pain, heartburn, or change in bowel habits  : NEGATIVE for frequency, dysuria, or hematuria  MUSCULOSKELETAL: NEGATIVE for significant arthralgias or myalgia  NEURO: NEGATIVE for weakness, dizziness or paresthesias  ENDOCRINE: NEGATIVE for temperature intolerance, skin/hair changes  HEME: NEGATIVE for bleeding problems  PSYCHIATRIC: NEGATIVE for changes in mood or affect     Objective    Exam  /82   Pulse 79   Temp 98.4  F (36.9  C) (Oral)   Resp (!) 9   Ht 1.803 m (5' 11\")   Wt 110.3 kg (243 lb 3.2 oz)   SpO2 98%   BMI 33.92 kg/m     Estimated body mass index is 33.92 kg/m  as calculated from the following:    Height as of this encounter: 1.803 m (5' 11\").    Weight as of this encounter: 110.3 kg (243 lb 3.2 oz).    Physical Exam  GENERAL: alert and no distress  EYES: Eyes grossly normal to inspection, PERRL and conjunctivae and sclerae normal  HENT: ear canals and TM's normal, nose and mouth without ulcers or lesions  NECK: no adenopathy, no asymmetry, masses, or scars  RESP: lungs clear to auscultation - no rales, rhonchi or wheezes  CV: regular rate and rhythm, normal S1 S2, no S3 or S4, no murmur, click or rub, no peripheral edema  ABDOMEN: soft, nontender, no hepatosplenomegaly, no masses and bowel sounds normal  MS: no gross musculoskeletal defects noted, no edema  SKIN: no suspicious lesions or rashes  NEURO: Normal strength and tone, mentation intact and speech normal  PSYCH: mentation appears normal, affect normal/bright        Signed Electronically by: Mary Cline MD    "

## 2025-04-07 NOTE — PATIENT INSTRUCTIONS
Patient Education   Preventive Care Advice   This is general advice given by our system to help you stay healthy. However, your care team may have specific advice just for you. Please talk to your care team about your preventive care needs.  Nutrition  Eat 5 or more servings of fruits and vegetables each day.  Try wheat bread, brown rice and whole grain pasta (instead of white bread, rice, and pasta).  Get enough calcium and vitamin D. Check the label on foods and aim for 100% of the RDA (recommended daily allowance).  Lifestyle  Exercise at least 150 minutes each week  (30 minutes a day, 5 days a week).  Do muscle strengthening activities 2 days a week. These help control your weight and prevent disease.  No smoking.  Wear sunscreen to prevent skin cancer.  Have a dental exam and cleaning every 6 months.  Yearly exams  See your health care team every year to talk about:  Any changes in your health.  Any medicines your care team has prescribed.  Preventive care, family planning, and ways to prevent chronic diseases.  Shots (vaccines)   HPV shots (up to age 26), if you've never had them before.  Hepatitis B shots (up to age 59), if you've never had them before.  COVID-19 shot: Get this shot when it's due.  Flu shot: Get a flu shot every year.  Tetanus shot: Get a tetanus shot every 10 years.  Pneumococcal, hepatitis A, and RSV shots: Ask your care team if you need these based on your risk.  Shingles shot (for age 50 and up)  General health tests  Diabetes screening:  Starting at age 35, Get screened for diabetes at least every 3 years.  If you are younger than age 35, ask your care team if you should be screened for diabetes.  Cholesterol test: At age 39, start having a cholesterol test every 5 years, or more often if advised.  Bone density scan (DEXA): At age 50, ask your care team if you should have this scan for osteoporosis (brittle bones).  Hepatitis C: Get tested at least once in your life.  STIs (sexually  transmitted infections)  Before age 24: Ask your care team if you should be screened for STIs.  After age 24: Get screened for STIs if you're at risk. You are at risk for STIs (including HIV) if:  You are sexually active with more than one person.  You don't use condoms every time.  You or a partner was diagnosed with a sexually transmitted infection.  If you are at risk for HIV, ask about PrEP medicine to prevent HIV.  Get tested for HIV at least once in your life, whether you are at risk for HIV or not.  Cancer screening tests  Cervical cancer screening: If you have a cervix, begin getting regular cervical cancer screening tests starting at age 21.  Breast cancer scan (mammogram): If you've ever had breasts, begin having regular mammograms starting at age 40. This is a scan to check for breast cancer.  Colon cancer screening: It is important to start screening for colon cancer at age 45.  Have a colonoscopy test every 10 years (or more often if you're at risk) Or, ask your provider about stool tests like a FIT test every year or Cologuard test every 3 years.  To learn more about your testing options, visit:   .  For help making a decision, visit:   https://bit.ly/ap27511.  Prostate cancer screening test: If you have a prostate, ask your care team if a prostate cancer screening test (PSA) at age 55 is right for you.  Lung cancer screening: If you are a current or former smoker ages 50 to 80, ask your care team if ongoing lung cancer screenings are right for you.  For informational purposes only. Not to replace the advice of your health care provider. Copyright   2023 [facility]. All rights reserved. Clinically reviewed by the [facility]. [employer] 755021 - REV 01/24.  Learning About Stress  What is stress?     Stress is your body's response to a hard situation. Your body can have a physical, emotional, or mental response. Stress is a fact of life for most people, and it  affects everyone differently. What causes stress for you may not be stressful for someone else.  A lot of things can cause stress. You may feel stress when you go on a job interview, take a test, or run a race. This kind of short-term stress is normal and even useful. It can help you if you need to work hard or react quickly. For example, stress can help you finish an important job on time.  Long-term stress is caused by ongoing stressful situations or events. Examples of long-term stress include long-term health problems, ongoing problems at work, or conflicts in your family. Long-term stress can harm your health.  How does stress affect your health?  When you are stressed, your body responds as though you are in danger. It makes hormones that speed up your heart, make you breathe faster, and give you a burst of energy. This is called the fight-or-flight stress response. If the stress is over quickly, your body goes back to normal and no harm is done.  But if stress happens too often or lasts too long, it can have bad effects. Long-term stress can make you more likely to get sick, and it can make symptoms of some diseases worse. If you tense up when you are stressed, you may develop neck, shoulder, or low back pain. Stress is linked to high blood pressure and heart disease.  Stress also harms your emotional health. It can make you costa, tense, or depressed. Your relationships may suffer, and you may not do well at work or school.  What can you do to manage stress?  You can try these things to help manage stress:   Do something active. Exercise or activity can help reduce stress. Walking is a great way to get started. Even everyday activities such as housecleaning or yard work can help.  Try yoga or paco chi. These techniques combine exercise and meditation. You may need some training at first to learn them.  Do something you enjoy. For example, listen to music or go to a movie. Practice your hobby or do volunteer  "work.  Meditate. This can help you relax, because you are not worrying about what happened before or what may happen in the future.  Do guided imagery. Imagine yourself in any setting that helps you feel calm. You can use online videos, books, or a teacher to guide you.  Do breathing exercises. For example:  From a standing position, bend forward from the waist with your knees slightly bent. Let your arms dangle close to the floor.  Breathe in slowly and deeply as you return to a standing position. Roll up slowly and lift your head last.  Hold your breath for just a few seconds in the standing position.  Breathe out slowly and bend forward from the waist.  Let your feelings out. Talk, laugh, cry, and express anger when you need to. Talking with supportive friends or family, a counselor, or a mario leader about your feelings is a healthy way to relieve stress. Avoid discussing your feelings with people who make you feel worse.  Write. It may help to write about things that are bothering you. This helps you find out how much stress you feel and what is causing it. When you know this, you can find better ways to cope.  What can you do to prevent stress?  You might try some of these things to help prevent stress:  Manage your time. This helps you find time to do the things you want and need to do.  Get enough sleep. Your body recovers from the stresses of the day while you are sleeping.  Get support. Your family, friends, and community can make a difference in how you experience stress.  Limit your news feed. Avoid or limit time on social media or news that may make you feel stressed.  Do something active. Exercise or activity can help reduce stress. Walking is a great way to get started.  Where can you learn more?  Go to https://www.[employer].net/patiented  Enter N032 in the search box to learn more about \"Learning About Stress.\"  Current as of: October 24, 2024  Content Version: 14.4 2024-2025 [employer]   Care instructions adapted under license by your healthcare professional. If you have questions about a medical condition or this instruction, always ask your healthcare professional. [employer] disclaims any warranty or liability for your use of this information.

## 2025-04-17 ENCOUNTER — LAB (OUTPATIENT)
Dept: LAB | Facility: CLINIC | Age: 55
End: 2025-04-17
Payer: COMMERCIAL

## 2025-04-17 DIAGNOSIS — Z13.1 DIABETES MELLITUS SCREENING: ICD-10-CM

## 2025-04-17 DIAGNOSIS — Z12.5 PROSTATE CANCER SCREENING: ICD-10-CM

## 2025-04-17 DIAGNOSIS — Z13.220 SCREENING CHOLESTEROL LEVEL: ICD-10-CM

## 2025-04-17 LAB
CHOLEST SERPL-MCNC: 144 MG/DL
FASTING STATUS PATIENT QL REPORTED: YES
FASTING STATUS PATIENT QL REPORTED: YES
GLUCOSE SERPL-MCNC: 104 MG/DL (ref 70–99)
HDLC SERPL-MCNC: 33 MG/DL
LDLC SERPL CALC-MCNC: 95 MG/DL
NONHDLC SERPL-MCNC: 111 MG/DL
PSA SERPL DL<=0.01 NG/ML-MCNC: 1.02 NG/ML (ref 0–3.5)
TRIGL SERPL-MCNC: 82 MG/DL

## (undated) DEVICE — SUCTION MANIFOLD NEPTUNE 2 SYS 4 PORT 0702-020-000

## (undated) DEVICE — ATTACHMENT CAP DISTAL REVEAL 15.0MM BX00711774

## (undated) DEVICE — NDL SCLEROTHERAPY 25GA CARR-LOCK  00711811

## (undated) DEVICE — KIT ENDO FIRST STEP DISINFECTANT 200ML W/POUCH EP-4

## (undated) DEVICE — ASSURANCE CLIP

## (undated) DEVICE — ENDO SNARE EXACTO COLD 9MM LOOP 2.4MMX230CM 00711115

## (undated) DEVICE — SNARE CAPIVATOR II POLYPECTOMY 25X240MM M00561190

## (undated) DEVICE — PAD CHUX UNDERPAD 23X24" 7136

## (undated) DEVICE — KIT CONNECTOR FOR OLYMPUS ENDOSCOPES DEFENDO 100310

## (undated) DEVICE — ENDO CAP AND TUBING STERILE FOR ENDOGATOR  100130

## (undated) DEVICE — SOL WATER IRRIG 1000ML BOTTLE 2F7114

## (undated) DEVICE — DEVICE RETRIEVAL ROTH NET PLATINUM UNIV 2.5MMX230CM 00715050

## (undated) DEVICE — ENDO TUBING CO2 SMARTCAP STERILE DISP 100145CO2EXT

## (undated) DEVICE — SOL NACL 0.9% IRRIG 1000ML BOTTLE 2F7124

## (undated) DEVICE — ENDO TRAP POLYP E-TRAP 00711099

## (undated) DEVICE — PACK ENDOSCOPY GI CUSTOM UMMC

## (undated) DEVICE — WIPE PREMOIST CLEANSING WASHCLOTHS 7988

## (undated) RX ORDER — PROPOFOL 10 MG/ML
INJECTION, EMULSION INTRAVENOUS
Status: DISPENSED
Start: 2023-03-17

## (undated) RX ORDER — EPINEPHRINE IN SOD CHLOR,ISO 1 MG/10 ML
SYRINGE (ML) INTRAVENOUS
Status: DISPENSED
Start: 2023-03-17

## (undated) RX ORDER — EPHEDRINE SULFATE 50 MG/ML
INJECTION, SOLUTION INTRAMUSCULAR; INTRAVENOUS; SUBCUTANEOUS
Status: DISPENSED
Start: 2023-03-17

## (undated) RX ORDER — ONDANSETRON 2 MG/ML
INJECTION INTRAMUSCULAR; INTRAVENOUS
Status: DISPENSED
Start: 2023-03-17